# Patient Record
Sex: MALE | Race: WHITE | NOT HISPANIC OR LATINO | Employment: OTHER | URBAN - METROPOLITAN AREA
[De-identification: names, ages, dates, MRNs, and addresses within clinical notes are randomized per-mention and may not be internally consistent; named-entity substitution may affect disease eponyms.]

---

## 2017-03-11 ENCOUNTER — HOSPITAL ENCOUNTER (OUTPATIENT)
Dept: LAB | Facility: MEDICAL CENTER | Age: 67
End: 2017-03-11
Attending: FAMILY MEDICINE
Payer: MEDICARE

## 2017-03-11 LAB
25(OH)D3 SERPL-MCNC: 36 NG/ML (ref 30–100)
ALBUMIN SERPL BCP-MCNC: 4.2 G/DL (ref 3.2–4.9)
ALBUMIN/GLOB SERPL: 1.4 G/DL
ALP SERPL-CCNC: 97 U/L (ref 30–99)
ALT SERPL-CCNC: 35 U/L (ref 2–50)
ANION GAP SERPL CALC-SCNC: 8 MMOL/L (ref 0–11.9)
AST SERPL-CCNC: 29 U/L (ref 12–45)
BASOPHILS # BLD AUTO: 0.03 K/UL (ref 0–0.12)
BASOPHILS NFR BLD AUTO: 0.5 % (ref 0–1.8)
BILIRUB SERPL-MCNC: 0.6 MG/DL (ref 0.1–1.5)
BUN SERPL-MCNC: 15 MG/DL (ref 8–22)
CALCIUM SERPL-MCNC: 9.7 MG/DL (ref 8.5–10.5)
CHLORIDE SERPL-SCNC: 102 MMOL/L (ref 96–112)
CHOLEST SERPL-MCNC: 213 MG/DL (ref 100–199)
CO2 SERPL-SCNC: 29 MMOL/L (ref 20–33)
CREAT SERPL-MCNC: 0.98 MG/DL (ref 0.5–1.4)
CRP SERPL HS-MCNC: 4.2 MG/L (ref 0–7.5)
EOSINOPHIL # BLD: 0.57 K/UL (ref 0–0.51)
EOSINOPHIL NFR BLD AUTO: 8.8 % (ref 0–6.9)
ERYTHROCYTE [DISTWIDTH] IN BLOOD BY AUTOMATED COUNT: 51.2 FL (ref 35.9–50)
FOLATE SERPL-MCNC: >23.6 NG/ML
GLOBULIN SER CALC-MCNC: 2.9 G/DL (ref 1.9–3.5)
GLUCOSE SERPL-MCNC: 93 MG/DL (ref 65–99)
HAV IGM SERPL QL IA: NEGATIVE
HBV CORE IGM SERPL QL IA: NEGATIVE
HBV SURFACE AG SERPL QL IA: NEGATIVE
HCT VFR BLD AUTO: 46.4 % (ref 42–52)
HCV AB S/CO SERPL IA: NEGATIVE
HCYS SERPL-SCNC: 17.51 UMOL/L
HDLC SERPL-MCNC: 87 MG/DL
HGB BLD-MCNC: 15.4 G/DL (ref 14–18)
IMM GRANULOCYTES # BLD AUTO: 0.01 K/UL (ref 0–0.11)
IMM GRANULOCYTES NFR BLD AUTO: 0.2 % (ref 0–0.9)
LDLC SERPL CALC-MCNC: 117 MG/DL
LYMPHOCYTES # BLD: 0.92 K/UL (ref 1–4.8)
LYMPHOCYTES NFR BLD AUTO: 14.2 % (ref 22–41)
MCH RBC QN AUTO: 31.9 PG (ref 27–33)
MCHC RBC AUTO-ENTMCNC: 33.2 G/DL (ref 33.7–35.3)
MCV RBC AUTO: 96.1 FL (ref 81.4–97.8)
MONOCYTES # BLD: 0.66 K/UL (ref 0–0.85)
MONOCYTES NFR BLD AUTO: 10.2 % (ref 0–13.4)
NEUTROPHILS # BLD: 4.27 K/UL (ref 1.82–7.42)
NEUTROPHILS NFR BLD AUTO: 66.1 % (ref 44–72)
NRBC # BLD AUTO: 0 K/UL
NRBC BLD-RTO: 0 /100 WBC
PLATELET # BLD AUTO: 169 K/UL (ref 164–446)
PMV BLD AUTO: 10.1 FL (ref 9–12.9)
POTASSIUM SERPL-SCNC: 3.8 MMOL/L (ref 3.6–5.5)
PROT SERPL-MCNC: 7.1 G/DL (ref 6–8.2)
RBC # BLD AUTO: 4.83 M/UL (ref 4.7–6.1)
SODIUM SERPL-SCNC: 139 MMOL/L (ref 135–145)
T3 SERPL-MCNC: 90.1 NG/DL (ref 60–181)
T4 SERPL-MCNC: 4.9 UG/DL (ref 4–12)
TRIGL SERPL-MCNC: 43 MG/DL (ref 0–149)
TSH SERPL DL<=0.005 MIU/L-ACNC: 1.42 UIU/ML (ref 0.3–3.7)
VIT B12 SERPL-MCNC: >1500 PG/ML (ref 211–911)
WBC # BLD AUTO: 6.5 K/UL (ref 4.8–10.8)

## 2017-03-11 PROCEDURE — 82306 VITAMIN D 25 HYDROXY: CPT | Mod: GA

## 2017-03-11 PROCEDURE — 85025 COMPLETE CBC W/AUTO DIFF WBC: CPT

## 2017-03-11 PROCEDURE — 84436 ASSAY OF TOTAL THYROXINE: CPT

## 2017-03-11 PROCEDURE — 84443 ASSAY THYROID STIM HORMONE: CPT

## 2017-03-11 PROCEDURE — 83921 ORGANIC ACID SINGLE QUANT: CPT

## 2017-03-11 PROCEDURE — 36415 COLL VENOUS BLD VENIPUNCTURE: CPT

## 2017-03-11 PROCEDURE — 80053 COMPREHEN METABOLIC PANEL: CPT

## 2017-03-11 PROCEDURE — 80074 ACUTE HEPATITIS PANEL: CPT

## 2017-03-11 PROCEDURE — 84403 ASSAY OF TOTAL TESTOSTERONE: CPT

## 2017-03-11 PROCEDURE — 84480 ASSAY TRIIODOTHYRONINE (T3): CPT

## 2017-03-11 PROCEDURE — 84207 ASSAY OF VITAMIN B-6: CPT

## 2017-03-11 PROCEDURE — 82607 VITAMIN B-12: CPT

## 2017-03-11 PROCEDURE — 84270 ASSAY OF SEX HORMONE GLOBUL: CPT

## 2017-03-11 PROCEDURE — 86141 C-REACTIVE PROTEIN HS: CPT | Mod: GA

## 2017-03-11 PROCEDURE — 82746 ASSAY OF FOLIC ACID SERUM: CPT

## 2017-03-11 PROCEDURE — 80061 LIPID PANEL: CPT | Mod: GA

## 2017-03-11 PROCEDURE — 83090 ASSAY OF HOMOCYSTEINE: CPT | Mod: GA

## 2017-03-11 PROCEDURE — 84402 ASSAY OF FREE TESTOSTERONE: CPT

## 2017-03-13 LAB
SHBG SERPL-SCNC: 84 NMOL/L (ref 11–80)
TESTOST FREE MFR SERPL: 1.1 % (ref 1.6–2.9)
TESTOST FREE SERPL-MCNC: 78 PG/ML (ref 47–244)
TESTOST SERPL-MCNC: 731 NG/DL (ref 300–720)

## 2017-03-14 LAB — VIT B6 SERPL-MCNC: 132.2 NMOL/L (ref 20–125)

## 2017-03-15 LAB — METHYLMALONATE SERPL-SCNC: <0.1 UMOL/L (ref 0–0.4)

## 2017-03-16 ENCOUNTER — NON-PROVIDER VISIT (OUTPATIENT)
Dept: CARDIOLOGY | Facility: MEDICAL CENTER | Age: 67
End: 2017-03-16
Payer: MEDICARE

## 2017-03-16 VITALS
DIASTOLIC BLOOD PRESSURE: 70 MMHG | WEIGHT: 184 LBS | OXYGEN SATURATION: 97 % | HEIGHT: 69 IN | HEART RATE: 78 BPM | BODY MASS INDEX: 27.25 KG/M2 | SYSTOLIC BLOOD PRESSURE: 126 MMHG

## 2017-03-16 DIAGNOSIS — R07.89 CHEST TIGHTNESS OR PRESSURE: ICD-10-CM

## 2017-03-16 DIAGNOSIS — R53.83 FATIGUE, UNSPECIFIED TYPE: ICD-10-CM

## 2017-03-16 DIAGNOSIS — R53.83 OTHER FATIGUE: ICD-10-CM

## 2017-03-16 DIAGNOSIS — Z13.6 SCREENING FOR ISCHEMIC HEART DISEASE: ICD-10-CM

## 2017-03-16 LAB — TREADMILL STRESS: NORMAL

## 2017-03-16 PROCEDURE — 93015 CV STRESS TEST SUPVJ I&R: CPT | Performed by: INTERNAL MEDICINE

## 2017-03-30 ENCOUNTER — HOSPITAL ENCOUNTER (OUTPATIENT)
Dept: LAB | Facility: MEDICAL CENTER | Age: 67
End: 2017-03-30
Attending: FAMILY MEDICINE
Payer: MEDICARE

## 2017-03-30 LAB
AMYLASE SERPL-CCNC: 66 U/L (ref 20–103)
CEA SERPL-MCNC: 7.3 NG/ML (ref 0–3)
GGT SERPL-CCNC: 21 U/L (ref 7–51)
LIPASE SERPL-CCNC: 48 U/L (ref 11–82)

## 2017-03-30 PROCEDURE — 82977 ASSAY OF GGT: CPT | Mod: GA

## 2017-03-30 PROCEDURE — 82150 ASSAY OF AMYLASE: CPT

## 2017-03-30 PROCEDURE — 36415 COLL VENOUS BLD VENIPUNCTURE: CPT

## 2017-03-30 PROCEDURE — 82378 CARCINOEMBRYONIC ANTIGEN: CPT | Mod: GA

## 2017-03-30 PROCEDURE — 83690 ASSAY OF LIPASE: CPT

## 2017-04-28 ENCOUNTER — HOSPITAL ENCOUNTER (OUTPATIENT)
Dept: LAB | Facility: MEDICAL CENTER | Age: 67
End: 2017-04-28
Attending: FAMILY MEDICINE
Payer: MEDICARE

## 2017-04-28 LAB — CEA SERPL-MCNC: 6.7 NG/ML (ref 0–3)

## 2017-04-28 PROCEDURE — 82378 CARCINOEMBRYONIC ANTIGEN: CPT

## 2017-04-28 PROCEDURE — 36415 COLL VENOUS BLD VENIPUNCTURE: CPT

## 2017-05-05 ENCOUNTER — HOSPITAL ENCOUNTER (OUTPATIENT)
Dept: RADIOLOGY | Facility: MEDICAL CENTER | Age: 67
End: 2017-05-05
Attending: FAMILY MEDICINE
Payer: MEDICARE

## 2017-05-05 DIAGNOSIS — R05.9 COUGH: ICD-10-CM

## 2017-05-05 DIAGNOSIS — R06.02 SHORTNESS OF BREATH: ICD-10-CM

## 2017-05-05 PROCEDURE — 71260 CT THORAX DX C+: CPT

## 2017-05-05 PROCEDURE — 700117 HCHG RX CONTRAST REV CODE 255: Performed by: FAMILY MEDICINE

## 2017-05-05 RX ADMIN — IOHEXOL 100 ML: 350 INJECTION, SOLUTION INTRAVENOUS at 11:46

## 2017-06-23 ENCOUNTER — HOSPITAL ENCOUNTER (OUTPATIENT)
Dept: LAB | Facility: MEDICAL CENTER | Age: 67
End: 2017-06-23
Attending: FAMILY MEDICINE
Payer: MEDICARE

## 2017-06-23 LAB — CEA SERPL-MCNC: 6.3 NG/ML (ref 0–3)

## 2017-06-23 PROCEDURE — 36415 COLL VENOUS BLD VENIPUNCTURE: CPT | Mod: GA

## 2017-06-23 PROCEDURE — 82378 CARCINOEMBRYONIC ANTIGEN: CPT | Mod: GA

## 2017-06-23 PROCEDURE — 86671 FUNGUS NES ANTIBODY: CPT

## 2017-06-23 PROCEDURE — 86255 FLUORESCENT ANTIBODY SCREEN: CPT

## 2017-06-25 LAB
ANCA IGG TITR SER IF: ABNORMAL {TITER}
BAKER'S YEAST IGA QN IA: 8.4 UNITS (ref 0–24.9)
BAKER'S YEAST IGG QN IA: 63.8 UNITS (ref 0–24.9)
IBD INTERP Q0082: ABNORMAL
PATHOLOGY STUDY: ABNORMAL

## 2017-09-21 DIAGNOSIS — Z01.810 PRE-OPERATIVE CARDIOVASCULAR EXAMINATION: ICD-10-CM

## 2017-09-21 DIAGNOSIS — Z01.812 PRE-OPERATIVE LABORATORY EXAMINATION: ICD-10-CM

## 2017-09-21 LAB
ANION GAP SERPL CALC-SCNC: 9 MMOL/L (ref 0–11.9)
BASOPHILS # BLD AUTO: 0.7 % (ref 0–1.8)
BASOPHILS # BLD: 0.05 K/UL (ref 0–0.12)
BUN SERPL-MCNC: 12 MG/DL (ref 8–22)
CALCIUM SERPL-MCNC: 9.2 MG/DL (ref 8.5–10.5)
CHLORIDE SERPL-SCNC: 95 MMOL/L (ref 96–112)
CO2 SERPL-SCNC: 24 MMOL/L (ref 20–33)
CREAT SERPL-MCNC: 0.89 MG/DL (ref 0.5–1.4)
EKG IMPRESSION: NORMAL
EOSINOPHIL # BLD AUTO: 0.37 K/UL (ref 0–0.51)
EOSINOPHIL NFR BLD: 5.2 % (ref 0–6.9)
ERYTHROCYTE [DISTWIDTH] IN BLOOD BY AUTOMATED COUNT: 47.4 FL (ref 35.9–50)
GFR SERPL CREATININE-BSD FRML MDRD: >60 ML/MIN/1.73 M 2
GLUCOSE SERPL-MCNC: 84 MG/DL (ref 65–99)
HCT VFR BLD AUTO: 42.9 % (ref 42–52)
HGB BLD-MCNC: 14.9 G/DL (ref 14–18)
IMM GRANULOCYTES # BLD AUTO: 0.02 K/UL (ref 0–0.11)
IMM GRANULOCYTES NFR BLD AUTO: 0.3 % (ref 0–0.9)
LYMPHOCYTES # BLD AUTO: 0.99 K/UL (ref 1–4.8)
LYMPHOCYTES NFR BLD: 14 % (ref 22–41)
MCH RBC QN AUTO: 32.5 PG (ref 27–33)
MCHC RBC AUTO-ENTMCNC: 34.7 G/DL (ref 33.7–35.3)
MCV RBC AUTO: 93.7 FL (ref 81.4–97.8)
MONOCYTES # BLD AUTO: 0.6 K/UL (ref 0–0.85)
MONOCYTES NFR BLD AUTO: 8.5 % (ref 0–13.4)
NEUTROPHILS # BLD AUTO: 5.05 K/UL (ref 1.82–7.42)
NEUTROPHILS NFR BLD: 71.3 % (ref 44–72)
NRBC # BLD AUTO: 0 K/UL
NRBC BLD AUTO-RTO: 0 /100 WBC
PLATELET # BLD AUTO: 187 K/UL (ref 164–446)
PMV BLD AUTO: 9.2 FL (ref 9–12.9)
POTASSIUM SERPL-SCNC: 3.7 MMOL/L (ref 3.6–5.5)
RBC # BLD AUTO: 4.58 M/UL (ref 4.7–6.1)
SODIUM SERPL-SCNC: 128 MMOL/L (ref 135–145)
WBC # BLD AUTO: 7.1 K/UL (ref 4.8–10.8)

## 2017-09-21 PROCEDURE — 36415 COLL VENOUS BLD VENIPUNCTURE: CPT

## 2017-09-21 PROCEDURE — 93005 ELECTROCARDIOGRAM TRACING: CPT

## 2017-09-21 PROCEDURE — 85025 COMPLETE CBC W/AUTO DIFF WBC: CPT

## 2017-09-21 PROCEDURE — 93010 ELECTROCARDIOGRAM REPORT: CPT | Performed by: INTERNAL MEDICINE

## 2017-09-21 PROCEDURE — 80048 BASIC METABOLIC PNL TOTAL CA: CPT

## 2017-09-22 ENCOUNTER — HOSPITAL ENCOUNTER (OUTPATIENT)
Facility: MEDICAL CENTER | Age: 67
End: 2017-09-22
Attending: INTERNAL MEDICINE | Admitting: INTERNAL MEDICINE
Payer: MEDICARE

## 2017-09-22 VITALS
HEART RATE: 64 BPM | RESPIRATION RATE: 14 BRPM | BODY MASS INDEX: 26.94 KG/M2 | DIASTOLIC BLOOD PRESSURE: 75 MMHG | HEIGHT: 69 IN | OXYGEN SATURATION: 96 % | TEMPERATURE: 97.3 F | WEIGHT: 181.88 LBS | SYSTOLIC BLOOD PRESSURE: 140 MMHG

## 2017-09-22 PROBLEM — R53.83 FATIGUE: Status: RESOLVED | Noted: 2017-03-16 | Resolved: 2017-09-22

## 2017-09-22 PROBLEM — R07.89 CHEST TIGHTNESS OR PRESSURE: Status: RESOLVED | Noted: 2017-03-16 | Resolved: 2017-09-22

## 2017-09-22 PROBLEM — D49.0 NEOPLASM OF UNSPECIFIED NATURE OF DIGESTIVE SYSTEM: Status: ACTIVE | Noted: 2017-09-22

## 2017-09-22 LAB — PATHOLOGY CONSULT NOTE: NORMAL

## 2017-09-22 PROCEDURE — 160009 HCHG ANES TIME/MIN: Performed by: INTERNAL MEDICINE

## 2017-09-22 PROCEDURE — 88305 TISSUE EXAM BY PATHOLOGIST: CPT

## 2017-09-22 PROCEDURE — 160035 HCHG PACU - 1ST 60 MINS PHASE I: Performed by: INTERNAL MEDICINE

## 2017-09-22 PROCEDURE — 700111 HCHG RX REV CODE 636 W/ 250 OVERRIDE (IP)

## 2017-09-22 PROCEDURE — 160048 HCHG OR STATISTICAL LEVEL 1-5: Performed by: INTERNAL MEDICINE

## 2017-09-22 PROCEDURE — 160047 HCHG PACU  - EA ADDL 30 MINS PHASE II: Performed by: INTERNAL MEDICINE

## 2017-09-22 PROCEDURE — 500066 HCHG BITE BLOCK, ECT: Performed by: INTERNAL MEDICINE

## 2017-09-22 PROCEDURE — 160046 HCHG PACU - 1ST 60 MINS PHASE II: Performed by: INTERNAL MEDICINE

## 2017-09-22 PROCEDURE — 160203 HCHG ENDO MINUTES - 1ST 30 MINS LEVEL 4: Performed by: INTERNAL MEDICINE

## 2017-09-22 PROCEDURE — 160002 HCHG RECOVERY MINUTES (STAT): Performed by: INTERNAL MEDICINE

## 2017-09-22 PROCEDURE — 160025 RECOVERY II MINUTES (STATS): Performed by: INTERNAL MEDICINE

## 2017-09-22 RX ORDER — MIDAZOLAM HYDROCHLORIDE 1 MG/ML
INJECTION INTRAMUSCULAR; INTRAVENOUS
Status: DISPENSED
Start: 2017-09-22 | End: 2017-09-22

## 2017-09-22 RX ORDER — SODIUM CHLORIDE 9 MG/ML
1000 INJECTION, SOLUTION INTRAVENOUS
Status: COMPLETED | OUTPATIENT
Start: 2017-09-22 | End: 2017-09-22

## 2017-09-22 RX ADMIN — SODIUM CHLORIDE 1000 ML: 9 INJECTION, SOLUTION INTRAVENOUS at 06:40

## 2017-09-22 ASSESSMENT — PAIN SCALES - GENERAL
PAINLEVEL_OUTOF10: 0

## 2017-09-22 NOTE — OR NURSING
0828 Pt to PACU from OR via gurney, respirations spontaneous and non-labored via 2L NC. Pt not arousable on calling, VSS.    0840 Pt denies nausea and pain, VSS.   0855 Pt tolerating PO fluids. Pt meets criteria for transfer to stage II.   0910 No changes, VSS.  0919 Report to RAFA Mitchell.   0941 Pt transported to stage II.

## 2017-09-22 NOTE — PROCEDURES
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 Flo Ayers    Consent: Risks, benefits, and alternatives were discussed with patient and/or family member(s).  Consenting person(s) were given an opportunity to ask questions and discuss other options.  Risks including but not limited to perforation, infection, bleeding, missed lesion(s), cardiac and/or pulmonary event, aspiration, stroke, possible need for surgery and/or interventional radiology, hospitalization possibly prolonged, discomfort, unsuccessful and/or incomplete procedure, ineffective therapy and/or persistent symptoms, possible need for repeat procedures and/or additional testings, damage to adjacent organs and/or vascular structures, medication reaction, disability, death, and other adverse events possibly life-threatening.  Discussion was undertaken with Layman's terms.  Consenting person(s) stated understanding and acceptance of these risks, and wished to proceed.  Informed consent was given in clear state of mind.    Endoscopic procedures in detail:   Diagnostic endoscope was inserted from mouth into second portion of the duodenum, retroflexion was performed in the stomach.  Side-viewing duodenoscopy inserted from mouth into second portion of the duodenum to examine the ampulla of Vater, ampullary and periampullary mucosal biopsies were obtained away from the pancreas os.  There was suction of insufflated air and stomach fluid contents upon removal.      Radial echoendoscope was inserted from mouth to  second portion of the duodenum.  Pancreas was examined with identification of normal vascular landmarks including superior mesenteric artery, superior mesenteric vein, portal vein, celiac artery, portal vein confluence, and splenorenal junction.  Biliary duct was imaged and traced from intrapancreatic portion to hepatic hilum.  Celiac axis was imaged to look for echogenic lymph nodes.  There was suction of insufflated air and stomach fluid contents upon removal.    Procedure times:  - In-room 08:06  - Start 08:12  - Completed 08:25  - Out of room 08:27    Esophagogastroduodenoscopy Findings:  - Esophagus: endoscopically unremarkable  - Stomach: endoscopically unremarkable  - Duodenum: prominent ampulla of Vater when looking at it obliquely but with side-viewing duodenoscope examination ampulla looks more normal and the prominence is actually from redundant duodenal folds around ampulla of Vater.    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  Routine post-endoscopy anesthesia recovery care.  Discharge patient when he is awake, alert and comfortable, when recovery riteria are met.  Aspiration and fall precautions x 24 hours.   2. Follow-up with established GI Dr. Toby Joel.  3. I spoke to patient and recovery nurse about impression, diagnosis and recommendations.

## 2017-09-22 NOTE — OR NURSING
0929: Patient to stage II from PACU.  Patient assisted with changing by CNA, walked to bathroom and is now sitting in recliner chair.  No reported nausea, reports no pain.   0958: Discharge instructions and education provided to patient and Spouse.  Discharge medications discussed, patient has no questions about discharge or discharge medication at this time.  IV removed, tip intact.

## 2017-09-22 NOTE — DISCHARGE INSTRUCTIONS
ENDOSCOPY HOME CARE INSTRUCTIONS    GASTROSCOPY OR ERCP  1. Don't eat or drink anything for about an hour after the test. You can then resume your regular diet.  2. Don't drive or drink alcohol for 24 hours. The medication you received will make you too drowsy.  3. Don't take any coffee, tea, or aspirin products until after you see your doctor. These can harm the lining of your stomach.  4. If you begin to vomit bloody material, or develop black or bloody stools, call your doctor as soon as possible.  5. If you have any neck, chest, abdominal pain or temp of 100 degrees, call your doctor.  6. See your doctor: [facility] office will call for follow-up with Dr. Ashford.  7. Additional instructions:    No alcohol or sleeping pills today.   Patient not to drive, operate heavy machinery or make important decisions for 24 hours.   Please confirm that patient has a responsible adult /chaparone to accompany patient.     8. Prescriptions: none given today     Results to convey to patient: Impression: Prominent ampulla of Vater from redundant duodenal folds nothing worrisome, Follow-up with established GI Dr. Toby Ashford.    You should call 911 if you develop problems with breathing or chest pain.  If any questions arise, call your doctor. If your doctor is not available, please feel free to call [phone]. You can also call the HEALTH HOTLINE open 24 hours/day, 7 days/week and speak to a nurse at [phone], or toll free [phone].        Depression / Suicide Risk    As you are discharged from this [facility] facility, it is important to learn how to keep safe from harming yourself.    Recognize the warning signs:  · Abrupt changes in personality, positive or negative- including increase in energy   · Giving away possessions  · Change in eating patterns- significant weight changes-  positive or negative  · Change in sleeping patterns- unable to sleep or sleeping all the time   · Unwillingness or  inability to communicate  · Depression  · Unusual sadness, discouragement and loneliness  · Talk of wanting to die  · Neglect of personal appearance   · Rebelliousness- reckless behavior  · Withdrawal from people/activities they love  · Confusion- inability to concentrate     If you or a loved one observes any of these behaviors or has concerns about self-harm, here's what you can do:  · Talk about it- your feelings and reasons for harming yourself  · Remove any means that you might use to hurt yourself (examples: pills, rope, extension cords, firearm)  · Get professional help from the community (Mental Health, Substance Abuse, psychological counseling)  · Do not be alone:Call your Safe Contact- someone whom you trust who will be there for you.  · Call your local CRISIS HOTLINE 516-1631 or 306-680-1496  · Call your local Children's Mobile Crisis Response Team [place] (932) 008-1447 or www.[employer]  · Call the toll free National Suicide Prevention Hotlines   · National Suicide Prevention Lifeline 434-402-DIZS (2275)  · National Hope Line Network 800-SUICIDE (792-6237)    I acknowledge receipt and understanding of these Home Care Instructions.

## 2017-12-08 ENCOUNTER — HOSPITAL ENCOUNTER (OUTPATIENT)
Dept: LAB | Facility: MEDICAL CENTER | Age: 67
End: 2017-12-08
Attending: FAMILY MEDICINE
Payer: MEDICARE

## 2017-12-08 LAB
ALBUMIN SERPL BCP-MCNC: 3.9 G/DL (ref 3.2–4.9)
ALBUMIN/GLOB SERPL: 1.6 G/DL
ALP SERPL-CCNC: 74 U/L (ref 30–99)
ALT SERPL-CCNC: 31 U/L (ref 2–50)
AMYLASE SERPL-CCNC: 56 U/L (ref 20–103)
ANION GAP SERPL CALC-SCNC: 10 MMOL/L (ref 0–11.9)
AST SERPL-CCNC: 32 U/L (ref 12–45)
BASOPHILS # BLD AUTO: 0.9 % (ref 0–1.8)
BASOPHILS # BLD: 0.05 K/UL (ref 0–0.12)
BILIRUB SERPL-MCNC: 0.6 MG/DL (ref 0.1–1.5)
BUN SERPL-MCNC: 15 MG/DL (ref 8–22)
CALCIUM SERPL-MCNC: 9.3 MG/DL (ref 8.5–10.5)
CEA SERPL-MCNC: 5.4 NG/ML (ref 0–3)
CHLORIDE SERPL-SCNC: 101 MMOL/L (ref 96–112)
CO2 SERPL-SCNC: 26 MMOL/L (ref 20–33)
CREAT SERPL-MCNC: 0.91 MG/DL (ref 0.5–1.4)
EOSINOPHIL # BLD AUTO: 0.4 K/UL (ref 0–0.51)
EOSINOPHIL NFR BLD: 6.8 % (ref 0–6.9)
ERYTHROCYTE [DISTWIDTH] IN BLOOD BY AUTOMATED COUNT: 52.6 FL (ref 35.9–50)
GFR SERPL CREATININE-BSD FRML MDRD: >60 ML/MIN/1.73 M 2
GLOBULIN SER CALC-MCNC: 2.4 G/DL (ref 1.9–3.5)
GLUCOSE SERPL-MCNC: 89 MG/DL (ref 65–99)
HCT VFR BLD AUTO: 45.4 % (ref 42–52)
HCYS SERPL-SCNC: 17.4 UMOL/L
HGB BLD-MCNC: 15.4 G/DL (ref 14–18)
IMM GRANULOCYTES # BLD AUTO: 0.02 K/UL (ref 0–0.11)
IMM GRANULOCYTES NFR BLD AUTO: 0.3 % (ref 0–0.9)
LIPASE SERPL-CCNC: 33 U/L (ref 11–82)
LYMPHOCYTES # BLD AUTO: 0.93 K/UL (ref 1–4.8)
LYMPHOCYTES NFR BLD: 15.8 % (ref 22–41)
MCH RBC QN AUTO: 32.8 PG (ref 27–33)
MCHC RBC AUTO-ENTMCNC: 33.9 G/DL (ref 33.7–35.3)
MCV RBC AUTO: 96.8 FL (ref 81.4–97.8)
MONOCYTES # BLD AUTO: 0.49 K/UL (ref 0–0.85)
MONOCYTES NFR BLD AUTO: 8.3 % (ref 0–13.4)
NEUTROPHILS # BLD AUTO: 3.98 K/UL (ref 1.82–7.42)
NEUTROPHILS NFR BLD: 67.9 % (ref 44–72)
NRBC # BLD AUTO: 0 K/UL
NRBC BLD AUTO-RTO: 0 /100 WBC
PLATELET # BLD AUTO: 167 K/UL (ref 164–446)
PMV BLD AUTO: 9.9 FL (ref 9–12.9)
POTASSIUM SERPL-SCNC: 3.9 MMOL/L (ref 3.6–5.5)
PROT SERPL-MCNC: 6.3 G/DL (ref 6–8.2)
RBC # BLD AUTO: 4.69 M/UL (ref 4.7–6.1)
SODIUM SERPL-SCNC: 137 MMOL/L (ref 135–145)
WBC # BLD AUTO: 5.9 K/UL (ref 4.8–10.8)

## 2017-12-08 PROCEDURE — 85025 COMPLETE CBC W/AUTO DIFF WBC: CPT

## 2017-12-08 PROCEDURE — 82150 ASSAY OF AMYLASE: CPT

## 2017-12-08 PROCEDURE — 80053 COMPREHEN METABOLIC PANEL: CPT

## 2017-12-08 PROCEDURE — 82378 CARCINOEMBRYONIC ANTIGEN: CPT

## 2017-12-08 PROCEDURE — 83921 ORGANIC ACID SINGLE QUANT: CPT

## 2017-12-08 PROCEDURE — 84585 ASSAY OF URINE VMA: CPT

## 2017-12-08 PROCEDURE — 83090 ASSAY OF HOMOCYSTEINE: CPT | Mod: GA

## 2017-12-08 PROCEDURE — 83690 ASSAY OF LIPASE: CPT

## 2017-12-08 PROCEDURE — 36415 COLL VENOUS BLD VENIPUNCTURE: CPT

## 2017-12-11 LAB
COLLECT DURATION TIME SPEC: NORMAL HRS
CREAT 24H UR-MCNC: 37 MG/DL
CREAT 24H UR-MRATE: NORMAL MG/D (ref 800–2100)
METHYLMALONATE SERPL-SCNC: <0.1 UMOL/L (ref 0–0.4)
SPECIMEN VOL ?TM UR: NORMAL ML
VMA & CREAT 24H UR-IMP: NORMAL
VMA 24H UR-MCNC: 0.9 MG/L
VMA 24H UR-MRATE: NORMAL MG/D (ref 0–7)
VMA/CREAT 24H UR: 2 MG/GCR (ref 0–6)

## 2018-01-18 ENCOUNTER — HOSPITAL ENCOUNTER (OUTPATIENT)
Dept: LAB | Facility: MEDICAL CENTER | Age: 68
End: 2018-01-18
Attending: FAMILY MEDICINE
Payer: MEDICARE

## 2018-01-18 LAB
CHOLEST SERPL-MCNC: 184 MG/DL (ref 100–199)
CRP SERPL HS-MCNC: 1.34 MG/DL (ref 0–0.75)
ERYTHROCYTE [SEDIMENTATION RATE] IN BLOOD BY WESTERGREN METHOD: 23 MM/HOUR (ref 0–20)
HDLC SERPL-MCNC: 90 MG/DL
LDLC SERPL CALC-MCNC: 85 MG/DL
TRIGL SERPL-MCNC: 45 MG/DL (ref 0–149)

## 2018-01-18 PROCEDURE — 80061 LIPID PANEL: CPT

## 2018-01-18 PROCEDURE — 85652 RBC SED RATE AUTOMATED: CPT

## 2018-01-18 PROCEDURE — 36415 COLL VENOUS BLD VENIPUNCTURE: CPT

## 2018-01-18 PROCEDURE — 86140 C-REACTIVE PROTEIN: CPT

## 2018-02-15 ENCOUNTER — OFFICE VISIT (OUTPATIENT)
Dept: MEDICAL GROUP | Facility: MEDICAL CENTER | Age: 68
End: 2018-02-15
Payer: MEDICARE

## 2018-02-15 VITALS
DIASTOLIC BLOOD PRESSURE: 80 MMHG | HEART RATE: 69 BPM | SYSTOLIC BLOOD PRESSURE: 126 MMHG | TEMPERATURE: 98.5 F | RESPIRATION RATE: 16 BRPM | BODY MASS INDEX: 27.06 KG/M2 | OXYGEN SATURATION: 98 % | HEIGHT: 70 IN | WEIGHT: 189 LBS

## 2018-02-15 DIAGNOSIS — M51.36 DDD (DEGENERATIVE DISC DISEASE), LUMBAR: ICD-10-CM

## 2018-02-15 DIAGNOSIS — Z80.0 FAMILY HISTORY OF PANCREATIC CANCER: ICD-10-CM

## 2018-02-15 DIAGNOSIS — R79.83 HOMOCYSTEINEMIA: ICD-10-CM

## 2018-02-15 DIAGNOSIS — R53.83 OTHER FATIGUE: ICD-10-CM

## 2018-02-15 DIAGNOSIS — R79.82 ELEVATED C-REACTIVE PROTEIN (CRP): ICD-10-CM

## 2018-02-15 DIAGNOSIS — K21.9 GASTROESOPHAGEAL REFLUX DISEASE WITHOUT ESOPHAGITIS: ICD-10-CM

## 2018-02-15 DIAGNOSIS — H25.9 AGE-RELATED CATARACT OF BOTH EYES, UNSPECIFIED AGE-RELATED CATARACT TYPE: ICD-10-CM

## 2018-02-15 DIAGNOSIS — K52.9 INFLAMMATORY BOWEL DISEASE: ICD-10-CM

## 2018-02-15 DIAGNOSIS — R97.0 ELEVATED CARCINOEMBRYONIC ANTIGEN (CEA): ICD-10-CM

## 2018-02-15 DIAGNOSIS — J30.1 CHRONIC SEASONAL ALLERGIC RHINITIS DUE TO POLLEN: ICD-10-CM

## 2018-02-15 DIAGNOSIS — G43.009 MIGRAINE WITHOUT AURA AND WITHOUT STATUS MIGRAINOSUS, NOT INTRACTABLE: ICD-10-CM

## 2018-02-15 DIAGNOSIS — H35.711 CENTRAL SEROUS CHORIORETINOPATHY OF RIGHT EYE: ICD-10-CM

## 2018-02-15 DIAGNOSIS — Z86.69 HISTORY OF BELL'S PALSY: ICD-10-CM

## 2018-02-15 DIAGNOSIS — Z80.0 FAMILY HISTORY OF COLON CANCER IN MOTHER: ICD-10-CM

## 2018-02-15 PROCEDURE — 99204 OFFICE O/P NEW MOD 45 MIN: CPT | Performed by: FAMILY MEDICINE

## 2018-02-15 ASSESSMENT — PATIENT HEALTH QUESTIONNAIRE - PHQ9: CLINICAL INTERPRETATION OF PHQ2 SCORE: 0

## 2018-02-15 NOTE — ASSESSMENT & PLAN NOTE
Patient complains of   nasal congestion, rhinorrhea, eye itching, eye irritation, watery eyes  Has tried over the counter medications, prescription nasal sprays using saline mist, netipot.  No asthma -  No increased symptoms with exertion.  Symptoms seasonal in spring, but worse in the fall.     ROS  No fever, chills, sweats.  No productive cough  No sinus pain or pressure  No swollen glands  No rash. No eczema.  Pertinent  ROS findings as above. All other systems reviewed and are negative.       Education: Discussed that no one medicine likely to provide complete relief. Discussed allergen avoidance and environment modification when possible, showering and shampooing before bed, taking shoes off indoors so not tracking pollen in home.  Discussed that meds more effective with daily use.   Advised Zyrtec or Allegra OTC, Nasal steroid Rx, OTC allergy eye drops prn. If not effective, consider immunotherapy.

## 2018-02-15 NOTE — LETTER
[employer]  Pratibha Corral M.D.  [address]  [place] [address]  Fax: [phone]   Authorization for Release/Disclosure of   Protected Health Information   Name: RIVER CONNOLLY : 1950 SSN: [id number]   Address: [facility] [address]  [place] [address] Phone:    [phone] (home) [phone] (work)   I authorize the entity listed below to release/disclose the PHI below to:   [employer] [facility]/Pratibha Corral M.D. and Pratibha Corral M.D.   Provider or Entity Name:   Address   City, State, Zip   Phone:      Fax:     Reason for request: continuity of care   Information to be released:    [  ] LAST COLONOSCOPY,  including any PATH REPORT and follow-up  [  ] LAST FIT/COLOGUARD RESULT [  ] LAST DEXA  [  ] LAST MAMMOGRAM  [  ] LAST PAP  [  ] LAST LABS [  ] RETINA EXAM REPORT  [  ] IMMUNIZATION RECORDS  [XXX  ] Release all info      [  ] Check here and initial the line next to each item to release ALL health information INCLUDING  _____ Care and treatment for drug and / or alcohol abuse  _____ HIV testing, infection status, or AIDS  _____ Genetic Testing    DATES OF SERVICE OR TIME PERIOD TO BE DISCLOSED: _____________  I understand and acknowledge that:  * This Authorization may be revoked at any time by you in writing, except if your health information has already been used or disclosed.  * Your health information that will be used or disclosed as a result of you signing this authorization could be re-disclosed by the recipient. If this occurs, your re-disclosed health information may no longer be protected by State or Federal laws.  * You may refuse to sign this Authorization. Your refusal will not affect your ability to obtain treatment.  * This Authorization becomes effective upon signing and will  on (date) __________.      If no date is indicated, this Authorization will  one (1) year from the signature date.    Name: River Connolly    Signature:   Date:     2/15/2018          PLEASE FAX REQUESTED RECORDS BACK TO: [phone]

## 2018-02-21 NOTE — ASSESSMENT & PLAN NOTE
Patient was found to have increased C-reactive protein and elevated homocysteine levels.  He is not on a medication for this.

## 2018-02-21 NOTE — ASSESSMENT & PLAN NOTE
Patient has had an elevated CEA that has been followed. His colonoscopy and endoscopy showed no reason for this. There is a family history of pancreatic cancer his brother just  of this. He has not had a CA 19 9. He denies any pain in the area. No unusual weight loss

## 2018-02-21 NOTE — ASSESSMENT & PLAN NOTE
Patient has a history of migraines that have been fairly well controlled. He goes into a dark room when he has exacerbations. He does not have auras. He denies any nausea or vomiting.

## 2018-02-21 NOTE — PROGRESS NOTES
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  This is stable.    Chronic seasonal allergic rhinitis due to pollen  Patient complains of   nasal congestion, rhinorrhea, eye itching, eye irritation, watery eyes  Has tried over the counter medications, prescription nasal sprays using saline mist, netipot.  No asthma -  No increased symptoms with exertion.  Symptoms seasonal in spring, but worse in the fall.     ROS  No fever, chills, sweats.  No productive cough  No sinus pain or pressure  No swollen glands  No rash. No eczema.  Pertinent  ROS findings as above. All other systems reviewed and are negative.       Education: Discussed that no one medicine likely to provide complete relief. Discussed allergen avoidance and environment modification when possible, showering and shampooing before bed, taking shoes off indoors so not tracking pollen in home.  Discussed that meds more effective with daily use.   Advised Zyrtec or Allegra OTC, Nasal steroid Rx, OTC allergy eye drops prn. If not effective, consider immunotherapy.      Age-related cataract of both eyes  Dr. Miranad is following    Elevated carcinoembryonic antigen (CEA)  Patient has had an elevated CEA that has been followed. His colonoscopy and endoscopy showed no reason for this. There is a family history of pancreatic cancer his brother just  of this. He has not had a CA 19 9. He denies any pain in the area. No unusual weight loss    Gastroesophageal reflux disease without esophagitis  Patient has a history of GERD that he manages with behavioral modifications.    Migraine without aura and without status migrainosus, not intractable  Patient has a history of migraines that have been fairly well controlled. He goes into a dark room when he has  "exacerbations. He does not have auras. He denies any nausea or vomiting.    DDD (degenerative disc disease), lumbar  Patient has degenerative disc disease and has had a temperature also for this.       No \"RED FLAGS’ FOR FRACTURE: (Recent major trauma, Minor trauma or strenuous lifting in older or potentially osteoporotic patient, History of chronic corticosteroid therapy)  No \"RED FLAGS\" FOR NEOPLASM OR INFECTION: (Age over 50 or under 20, History of cancer, jose. breast, lung, prostate, Recent fever/chills, Recent unexplained weight loss,  Recent bacterial infection, current IV drug use, Immunosuppression (from meds, HIV, etc.), Pain worse when supine or at night)  No \"RED FLAGS’ FOR CAUDA EQUINA SYNDROME: (Saddle anesthesia, urine retention, fecal incontinence, Severe or progressive LE neurologic deficit)          Elevated C-reactive protein (CRP)  Patient was found to have increased C-reactive protein and elevated homocysteine levels.  He is not on a medication for this.      No Known Allergies    Current medicines (including changes today)  Current Outpatient Prescriptions   Medication Sig Dispense Refill   • multivitamin (THERAGRAN) Tab Take 1 Tab by mouth every day.       No current facility-administered medications for this visit.      He  has a past medical history of Arthritis; Cataract; Central serous retinopathy; Central serous retinopathy (10/17/2011); Cold (09/2017); Heart burn; and Hemorrhagic disorder (CMS-HCC).  He  has a past surgical history that includes umbilical hernia repair (2006); other (Right, 1969); gastroscopy (9/22/2017); egd w/endoscopic ultrasound (9/22/2017); gastroscopy with biopsy (9/22/2017); toe arthroplasty (6/26/2013); tonsillectomy and adenoidectomy; and cervical laminectomy posterior.  Social History   Substance Use Topics   • Smoking status: Former Smoker     Packs/day: 1.00     Years: 20.00     Types: Cigarettes     Quit date: 2/15/2001   • Smokeless tobacco: Never Used   • " "Alcohol use 0.6 oz/week     1 Glasses of wine per week     Social History     Social History Narrative   • No narrative on file     Family History   Problem Relation Age of Onset   • Cancer Mother      breast and adenocarcinoma of colon   • Stroke Father    • Cancer Brother      pancreatic cancer     Family Status   Relation Status   • Mother    • Father    • Brother          ROS  No fever or chills.  No nausea or vomiting.  No chest pain or palpitations.  No cough or SOB.  No pain with urination or hematuria.  No black or bloody stools.  All other systems reviewed and are negative     Objective:     Blood pressure 126/80, pulse 69, temperature 36.9 °C (98.5 °F), resp. rate 16, height 1.778 m (5' 10\"), weight 85.7 kg (189 lb), SpO2 98 %. Body mass index is 27.12 kg/m².  Physical Exam:      Well developed, well nourished.  Alert, oriented in no acute distress.  Psych: Eye contact is good, speech goal directed, affect calm  Eyes: conjunctiva non-injected, sclera non-icteric.  Ears: Pinna normal. TM pearly gray.   Nose: Nares are patent.  Pale, boggy mucosa  Mouth: Oral mucous membranes pink and moist with no lesions.  Neck Supple.  No adenopathy or masses in the neck or supraclavicular regions. No thyromegaly  Lungs: clear to auscultation bilaterally with good excursion. No wheezes or rhonchi  CV: regular rate and rhythm. No murmur  Abdomen: soft, nontender, no masses or organomegaly.  No rebound or guarding  Ext: no edema, color normal, vascularity normal, temperature normal      Assessment and Plan:   The following treatment plan was discussed    1. Elevated carcinoembryonic antigen (CEA)  Repeat CEA.  Check CA-19-9  - CEA; Future  - CARBOHYDRATE ANTIGEN 19-9    2. Central serous chorioretinopathy of right eye  Followed up with ophthalmology    3. Chronic seasonal allergic rhinitis due to pollen  Symptomatic care. Call if symptoms worsen    4. Age-related cataract of both eyes, unspecified " age-related cataract type  Follow-up with ophthalmology    5. Gastroesophageal reflux disease without esophagitis  Follow-up with gastroenterology  - CBC WITH DIFFERENTIAL; Future    6. Inflammatory bowel disease  Stable. Follow up with gastroenterology    7. Migraine without aura and without status migrainosus, not intractable  Stable. Patient reports that amitriptyline and propranolol well for this he has a flare.    8. History of Bell's palsy  Stable. No recurrence    9. DDD (degenerative disc disease), lumbar  Stable. No current pain    10. Elevated C-reactive protein (CRP)  Recheck CRP  - CRP HIGH SENSITIVE (CARDIAC); Future    11. Homocysteinemia (CMS-HCC)  Recheck  - HOMOC she homocysteineYSTEINE; Future    12. Family history of colon cancer in mother    - CEA; Future    13. Family history of pancreatic cancer    - CEA; Future  - CARBOHYDRATE ANTIGEN 19-9    14. Other fatigue  - CBC WITH DIFFERENTIAL; Future  - TSH; Future  - FREE THYROXINE; Future      Records requested.    Any change or worsening of signs or symptoms, patient encouraged to follow-up or report to the emergency room for further evaluation. Patient understands and agrees.    Followup: Return if symptoms worsen or fail to improve.

## 2018-04-19 ENCOUNTER — HOSPITAL ENCOUNTER (OUTPATIENT)
Dept: LAB | Facility: MEDICAL CENTER | Age: 68
End: 2018-04-19
Attending: FAMILY MEDICINE
Payer: MEDICARE

## 2018-04-19 DIAGNOSIS — R79.83 HOMOCYSTEINEMIA: ICD-10-CM

## 2018-04-19 DIAGNOSIS — Z80.0 FAMILY HISTORY OF COLON CANCER IN MOTHER: ICD-10-CM

## 2018-04-19 DIAGNOSIS — R53.83 OTHER FATIGUE: ICD-10-CM

## 2018-04-19 DIAGNOSIS — R97.0 ELEVATED CARCINOEMBRYONIC ANTIGEN (CEA): ICD-10-CM

## 2018-04-19 DIAGNOSIS — R79.82 ELEVATED C-REACTIVE PROTEIN (CRP): ICD-10-CM

## 2018-04-19 DIAGNOSIS — Z80.0 FAMILY HISTORY OF PANCREATIC CANCER: ICD-10-CM

## 2018-04-19 DIAGNOSIS — K21.9 GASTROESOPHAGEAL REFLUX DISEASE WITHOUT ESOPHAGITIS: ICD-10-CM

## 2018-04-19 LAB
BASOPHILS # BLD AUTO: 0.9 % (ref 0–1.8)
BASOPHILS # BLD: 0.07 K/UL (ref 0–0.12)
CANCER AG19-9 SERPL-ACNC: 7.9 U/ML (ref 0–35)
CEA SERPL-MCNC: 5.6 NG/ML (ref 0–3)
CRP SERPL HS-MCNC: 3.1 MG/L (ref 0–7.5)
EOSINOPHIL # BLD AUTO: 0.46 K/UL (ref 0–0.51)
EOSINOPHIL NFR BLD: 5.9 % (ref 0–6.9)
ERYTHROCYTE [DISTWIDTH] IN BLOOD BY AUTOMATED COUNT: 54.2 FL (ref 35.9–50)
HCT VFR BLD AUTO: 43.5 % (ref 42–52)
HCYS SERPL-SCNC: 16.6 UMOL/L
HGB BLD-MCNC: 14.8 G/DL (ref 14–18)
IMM GRANULOCYTES # BLD AUTO: 0.02 K/UL (ref 0–0.11)
IMM GRANULOCYTES NFR BLD AUTO: 0.3 % (ref 0–0.9)
LYMPHOCYTES # BLD AUTO: 1.1 K/UL (ref 1–4.8)
LYMPHOCYTES NFR BLD: 14 % (ref 22–41)
MCH RBC QN AUTO: 33 PG (ref 27–33)
MCHC RBC AUTO-ENTMCNC: 34 G/DL (ref 33.7–35.3)
MCV RBC AUTO: 96.9 FL (ref 81.4–97.8)
MONOCYTES # BLD AUTO: 0.69 K/UL (ref 0–0.85)
MONOCYTES NFR BLD AUTO: 8.8 % (ref 0–13.4)
NEUTROPHILS # BLD AUTO: 5.52 K/UL (ref 1.82–7.42)
NEUTROPHILS NFR BLD: 70.1 % (ref 44–72)
NRBC # BLD AUTO: 0 K/UL
NRBC BLD-RTO: 0 /100 WBC
PLATELET # BLD AUTO: 146 K/UL (ref 164–446)
PMV BLD AUTO: 9.5 FL (ref 9–12.9)
RBC # BLD AUTO: 4.49 M/UL (ref 4.7–6.1)
T4 FREE SERPL-MCNC: 1.46 NG/DL (ref 0.53–1.43)
TSH SERPL DL<=0.005 MIU/L-ACNC: 2.37 UIU/ML (ref 0.38–5.33)
WBC # BLD AUTO: 7.9 K/UL (ref 4.8–10.8)

## 2018-04-19 PROCEDURE — 84443 ASSAY THYROID STIM HORMONE: CPT

## 2018-04-19 PROCEDURE — 82378 CARCINOEMBRYONIC ANTIGEN: CPT

## 2018-04-19 PROCEDURE — 36415 COLL VENOUS BLD VENIPUNCTURE: CPT

## 2018-04-19 PROCEDURE — 85025 COMPLETE CBC W/AUTO DIFF WBC: CPT

## 2018-04-19 PROCEDURE — 83090 ASSAY OF HOMOCYSTEINE: CPT | Mod: GA

## 2018-04-19 PROCEDURE — 84439 ASSAY OF FREE THYROXINE: CPT

## 2018-04-19 PROCEDURE — 86301 IMMUNOASSAY TUMOR CA 19-9: CPT | Mod: GA

## 2018-04-19 PROCEDURE — 86141 C-REACTIVE PROTEIN HS: CPT | Mod: GA

## 2018-04-25 ENCOUNTER — TELEPHONE (OUTPATIENT)
Dept: MEDICAL GROUP | Facility: MEDICAL CENTER | Age: 68
End: 2018-04-25

## 2018-04-25 NOTE — TELEPHONE ENCOUNTER
----- Message from Pratibha Corral M.D. sent at 4/25/2018  1:02 PM PDT -----  Toni Hadley  Here are your recent labs results. Please make an appointment so we can go over these.  Please let me know if you have any further questions.  Have a great day.  Pratibha Corral M.D.

## 2018-05-22 ENCOUNTER — OFFICE VISIT (OUTPATIENT)
Dept: MEDICAL GROUP | Facility: MEDICAL CENTER | Age: 68
End: 2018-05-22
Payer: MEDICARE

## 2018-05-22 VITALS
SYSTOLIC BLOOD PRESSURE: 124 MMHG | BODY MASS INDEX: 26.81 KG/M2 | HEART RATE: 79 BPM | HEIGHT: 69 IN | TEMPERATURE: 97.9 F | DIASTOLIC BLOOD PRESSURE: 64 MMHG | WEIGHT: 181 LBS | RESPIRATION RATE: 20 BRPM | OXYGEN SATURATION: 96 %

## 2018-05-22 DIAGNOSIS — R97.0 ELEVATED CARCINOEMBRYONIC ANTIGEN (CEA): ICD-10-CM

## 2018-05-22 DIAGNOSIS — R79.89 ELEVATED SERUM FREE T4 LEVEL: ICD-10-CM

## 2018-05-22 DIAGNOSIS — Z80.0 FAMILY HISTORY OF COLON CANCER IN MOTHER: ICD-10-CM

## 2018-05-22 DIAGNOSIS — Z80.0 FAMILY HISTORY OF PANCREATIC CANCER: ICD-10-CM

## 2018-05-22 PROCEDURE — 99214 OFFICE O/P EST MOD 30 MIN: CPT | Performed by: FAMILY MEDICINE

## 2018-05-22 RX ORDER — RANITIDINE 150 MG/1
150 TABLET ORAL 2 TIMES DAILY
COMMUNITY
End: 2020-02-18

## 2018-05-23 NOTE — ASSESSMENT & PLAN NOTE
Patient's CEA continues to rise. It is now 5.6. Patient had an endoscopy and colonoscopy last year that were negative except for a polyp. He  had a CT of the abdomen and pelvis last year that were normal. He's concerned because of a family history of colon cancer in his mother and pancreatic cancer in his brother. He denies any unusual weight loss. He has had some fatigue. He denies any palpitations or insomnia. No abdominal bloating. A CA-19-9 was negative.

## 2018-05-23 NOTE — ASSESSMENT & PLAN NOTE
Patient had a slightly elevated free T4 but with a normal TSH. He's never had an abnormal thyroid function before. He denies any palpitations, unusual weight loss or insomnia.

## 2018-05-23 NOTE — PROGRESS NOTES
Subjective:     Chief Complaint   Patient presents with   • Follow-Up     labs       Jomar Thomas is a 67 y.o. male here today for evaluation and management of:    Elevated carcinoembryonic antigen (CEA)  Patient's CEA continues to rise. It is now 5.6. Patient had an endoscopy and colonoscopy last year that were negative except for a polyp. He  had a CT of the abdomen and pelvis last year that were normal. He's concerned because of a family history of colon cancer in his mother and pancreatic cancer in his brother. He denies any unusual weight loss. He has had some fatigue. He denies any palpitations or insomnia. No abdominal bloating. A CA-19-9 was negative.    Elevated serum free T4 level  Patient had a slightly elevated free T4 but with a normal TSH. He's never had an abnormal thyroid function before. He denies any palpitations, unusual weight loss or insomnia.       No Known Allergies    Current medicines (including changes today)  Current Outpatient Prescriptions   Medication Sig Dispense Refill   • raNITidine (ZANTAC) 150 MG Tab Take 150 mg by mouth 2 times a day.     • multivitamin (THERAGRAN) Tab Take 1 Tab by mouth every day.       No current facility-administered medications for this visit.        He  has a past medical history of Arthritis; Cataract; Central serous retinopathy; Central serous retinopathy (10/17/2011); Cold (09/2017); Heart burn; and Hemorrhagic disorder (HCC).    Patient Active Problem List    Diagnosis Date Noted   • 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 09/22/2017     Priority: High   • Elevated serum free T4 level 05/22/2018   • Elevated carcinoembryonic antigen (CEA) 02/15/2018   • Chronic seasonal allergic rhinitis due to pollen 02/15/2018   • Age-related cataract of both eyes 02/15/2018   • Gastroesophageal reflux disease without esophagitis 02/15/2018   • Inflammatory bowel disease 02/15/2018   • Migraine without aura and without status migrainosus, not  "intractable 02/15/2018   • History of Bell's palsy 02/15/2018   • DDD (degenerative disc disease), lumbar 02/15/2018   • Elevated C-reactive protein (CRP) 02/15/2018   • Homocysteinemia (HCC) 02/15/2018   • Family history of colon cancer in mother 02/15/2018   • Family history of pancreatic cancer 02/15/2018   • Other fatigue 03/16/2017   • Central serous retinopathy 10/17/2011       ROS   No fever or chills.  No nausea or vomiting.  No chest pain or palpitations.  No cough or SOB.  No pain with urination or hematuria.  No black or bloody stools.       Objective:     Blood pressure 124/64, pulse 79, temperature 36.6 °C (97.9 °F), resp. rate 20, height 1.753 m (5' 9\"), weight 82.1 kg (181 lb), SpO2 96 %. Body mass index is 26.73 kg/m².   Physical Exam:  Well developed, well nourished.  Alert, oriented in no acute distress.  Eye contact is good, speech goal directed, affect calm  Eyes: conjunctiva non-injected, sclera non-icteric.  Neck Supple.  No adenopathy or masses in the neck or supraclavicular regions. No thyromegaly  Lungs: clear to auscultation bilaterally with good excursion. No wheezes or rhonchi  CV: regular rate and rhythm. No murmur  Abdomen: soft, nontender, no masses or organomegaly.  No rebound or guarding            Assessment and Plan:   The following treatment plan was discussed    1. Elevated carcinoembryonic antigen (CEA)  Given his rising CEA will get an MRI of the abdomen with and without contrast. Repeat CEA. We will also check the thyroid as this can be a cause for an elevated CEA.   - MR-ABDOMEN-WITH & W/O; Future  - BASIC METABOLIC PANEL; Future  - CEA; Future  - US-SOFT TISSUES OF HEAD - NECK; Future    2. Elevated serum free T4 level  Check T3. Ultrasound to assess  - US-SOFT TISSUES OF HEAD - NECK; Future  - TRIIDOTHYRONINE; Future    3. Family history of colon cancer in mother  See #1    4. Family history of pancreatic cancer  See #1    Any change or worsening of signs or symptoms, " patient encouraged to follow-up or report to the emergency room for further evaluation. Patient understands and agrees.    Followup: Return if symptoms worsen or fail to improve.

## 2018-06-01 ENCOUNTER — HOSPITAL ENCOUNTER (OUTPATIENT)
Dept: LAB | Facility: MEDICAL CENTER | Age: 68
End: 2018-06-01
Attending: FAMILY MEDICINE
Payer: MEDICARE

## 2018-06-01 DIAGNOSIS — R97.0 ELEVATED CARCINOEMBRYONIC ANTIGEN (CEA): ICD-10-CM

## 2018-06-01 DIAGNOSIS — R79.89 ELEVATED SERUM FREE T4 LEVEL: ICD-10-CM

## 2018-06-01 LAB
ANION GAP SERPL CALC-SCNC: 12 MMOL/L (ref 0–11.9)
BUN SERPL-MCNC: 11 MG/DL (ref 8–22)
CALCIUM SERPL-MCNC: 9.5 MG/DL (ref 8.5–10.5)
CEA SERPL-MCNC: 4.9 NG/ML (ref 0–3)
CHLORIDE SERPL-SCNC: 97 MMOL/L (ref 96–112)
CO2 SERPL-SCNC: 23 MMOL/L (ref 20–33)
CREAT SERPL-MCNC: 0.88 MG/DL (ref 0.5–1.4)
GLUCOSE SERPL-MCNC: 85 MG/DL (ref 65–99)
POTASSIUM SERPL-SCNC: 3.4 MMOL/L (ref 3.6–5.5)
SODIUM SERPL-SCNC: 132 MMOL/L (ref 135–145)
T3 SERPL-MCNC: 203.5 NG/DL (ref 60–181)

## 2018-06-01 PROCEDURE — 80048 BASIC METABOLIC PNL TOTAL CA: CPT

## 2018-06-01 PROCEDURE — 36415 COLL VENOUS BLD VENIPUNCTURE: CPT

## 2018-06-01 PROCEDURE — 82378 CARCINOEMBRYONIC ANTIGEN: CPT

## 2018-06-01 PROCEDURE — 84480 ASSAY TRIIODOTHYRONINE (T3): CPT

## 2018-06-07 ENCOUNTER — HOSPITAL ENCOUNTER (OUTPATIENT)
Dept: RADIOLOGY | Facility: MEDICAL CENTER | Age: 68
End: 2018-06-07
Attending: FAMILY MEDICINE
Payer: MEDICARE

## 2018-06-07 DIAGNOSIS — R97.0 ELEVATED CARCINOEMBRYONIC ANTIGEN (CEA): ICD-10-CM

## 2018-06-07 DIAGNOSIS — R79.89 ELEVATED SERUM FREE T4 LEVEL: ICD-10-CM

## 2018-06-07 PROCEDURE — 74183 MRI ABD W/O CNTR FLWD CNTR: CPT

## 2018-06-07 PROCEDURE — 76536 US EXAM OF HEAD AND NECK: CPT

## 2018-06-07 PROCEDURE — A9579 GAD-BASE MR CONTRAST NOS,1ML: HCPCS | Performed by: FAMILY MEDICINE

## 2018-06-07 PROCEDURE — 700117 HCHG RX CONTRAST REV CODE 255: Performed by: FAMILY MEDICINE

## 2018-06-07 RX ADMIN — GADODIAMIDE 20 ML: 287 INJECTION INTRAVENOUS at 10:13

## 2018-06-08 ENCOUNTER — PATIENT MESSAGE (OUTPATIENT)
Dept: MEDICAL GROUP | Facility: MEDICAL CENTER | Age: 68
End: 2018-06-08

## 2018-06-08 DIAGNOSIS — R79.89 ELEVATED SERUM FREE T4 LEVEL: ICD-10-CM

## 2018-06-08 NOTE — TELEPHONE ENCOUNTER
From: Jomar Thomas  To: Pratibha Corral M.D.  Sent: 6/8/2018 2:42 PM PDT  Subject: Test Result Question    Pratibha,  As a result of my recent tests you suggested that I see an endocrinologist.  Who would you recommend?  Satya

## 2018-06-11 ENCOUNTER — PATIENT OUTREACH (OUTPATIENT)
Dept: HEALTH INFORMATION MANAGEMENT | Facility: OTHER | Age: 68
End: 2018-06-11

## 2018-06-11 NOTE — PROGRESS NOTES
Outcome: Left Message    Please transfer to Patient Outreach Team at [phone] when patient returns call.

## 2018-06-12 NOTE — PROGRESS NOTES
Attempt #:Final  Verify PCP: yes    Communication Preference Obtained: no // Pt declined to complete pre-planning over the phone.     Review Care Team: no    Annual Wellness Visit Scheduling  1. Scheduling Status:Scheduled      Care Gap Scheduling (Attempt to Schedule EACH Overdue Care Gap!)  Health Maintenance Due   Topic Date Due   • Annual Wellness Visit  1950   • IMM DTaP/Tdap/Td Vaccine (1 - Tdap) 06/22/1967   • IMM PNEUMOCOCCAL 65+ (ADULT) LOW/MEDIUM RISK SERIES (1 of 2 - PCV13) 06/22/2013       MyChart Activation: already active

## 2018-07-17 ENCOUNTER — OFFICE VISIT (OUTPATIENT)
Dept: MEDICAL GROUP | Facility: MEDICAL CENTER | Age: 68
End: 2018-07-17
Payer: MEDICARE

## 2018-07-17 VITALS
OXYGEN SATURATION: 94 % | TEMPERATURE: 98 F | WEIGHT: 182.5 LBS | HEIGHT: 69 IN | BODY MASS INDEX: 27.03 KG/M2 | SYSTOLIC BLOOD PRESSURE: 128 MMHG | DIASTOLIC BLOOD PRESSURE: 60 MMHG | HEART RATE: 91 BPM

## 2018-07-17 DIAGNOSIS — E77.8 OTHER DISORDERS OF GLYCOPROTEIN METABOLISM (HCC): ICD-10-CM

## 2018-07-17 DIAGNOSIS — G43.009 MIGRAINE WITHOUT AURA AND WITHOUT STATUS MIGRAINOSUS, NOT INTRACTABLE: ICD-10-CM

## 2018-07-17 DIAGNOSIS — R97.0 ELEVATED CARCINOEMBRYONIC ANTIGEN (CEA): ICD-10-CM

## 2018-07-17 DIAGNOSIS — Z86.69 HISTORY OF BELL'S PALSY: ICD-10-CM

## 2018-07-17 DIAGNOSIS — R79.89 ELEVATED SERUM FREE T4 LEVEL: ICD-10-CM

## 2018-07-17 DIAGNOSIS — H35.711 CENTRAL SEROUS CHORIORETINOPATHY OF RIGHT EYE: ICD-10-CM

## 2018-07-17 DIAGNOSIS — Z13.0 SCREENING FOR DEFICIENCY ANEMIA: ICD-10-CM

## 2018-07-17 DIAGNOSIS — Z13.220 SCREENING FOR HYPERLIPIDEMIA: ICD-10-CM

## 2018-07-17 DIAGNOSIS — R79.82 ELEVATED C-REACTIVE PROTEIN (CRP): ICD-10-CM

## 2018-07-17 DIAGNOSIS — Z13.6 ENCOUNTER FOR SCREENING FOR CARDIOVASCULAR DISORDERS: ICD-10-CM

## 2018-07-17 DIAGNOSIS — S39.012A STRAIN OF LUMBAR REGION, INITIAL ENCOUNTER: ICD-10-CM

## 2018-07-17 DIAGNOSIS — K21.9 GASTROESOPHAGEAL REFLUX DISEASE WITHOUT ESOPHAGITIS: ICD-10-CM

## 2018-07-17 DIAGNOSIS — Z13.1 SCREENING FOR DIABETES MELLITUS: ICD-10-CM

## 2018-07-17 DIAGNOSIS — R79.83 HOMOCYSTEINEMIA: ICD-10-CM

## 2018-07-17 DIAGNOSIS — J30.1 CHRONIC SEASONAL ALLERGIC RHINITIS DUE TO POLLEN: ICD-10-CM

## 2018-07-17 DIAGNOSIS — M51.36 DDD (DEGENERATIVE DISC DISEASE), LUMBAR: ICD-10-CM

## 2018-07-17 DIAGNOSIS — K52.9 INFLAMMATORY BOWEL DISEASE: ICD-10-CM

## 2018-07-17 DIAGNOSIS — Z00.00 MEDICARE ANNUAL WELLNESS VISIT, SUBSEQUENT: ICD-10-CM

## 2018-07-17 PROBLEM — D49.0 NEOPLASM OF DIGESTIVE SYSTEM: Status: RESOLVED | Noted: 2017-09-22 | Resolved: 2018-07-17

## 2018-07-17 PROBLEM — R53.83 OTHER FATIGUE: Status: RESOLVED | Noted: 2017-03-16 | Resolved: 2018-07-17

## 2018-07-17 PROCEDURE — G0438 PPPS, INITIAL VISIT: HCPCS | Performed by: FAMILY MEDICINE

## 2018-07-17 ASSESSMENT — ACTIVITIES OF DAILY LIVING (ADL): BATHING_REQUIRES_ASSISTANCE: 0

## 2018-07-17 ASSESSMENT — PATIENT HEALTH QUESTIONNAIRE - PHQ9: CLINICAL INTERPRETATION OF PHQ2 SCORE: 0

## 2018-07-17 ASSESSMENT — ENCOUNTER SYMPTOMS: GENERAL WELL-BEING: GOOD

## 2018-07-17 ASSESSMENT — PAIN SCALES - GENERAL: PAINLEVEL: 5=MODERATE PAIN

## 2018-07-17 NOTE — PROGRESS NOTES
Chief Complaint   Patient presents with   • Annual Wellness Visit         HPI:  Jomar is a 68 y.o. here for Medicare Annual Wellness Visit        Patient Active Problem List    Diagnosis Date Noted   • Strain of lumbar region 07/17/2018   • Elevated serum free T4 level 05/22/2018   • Elevated carcinoembryonic antigen (CEA) 02/15/2018   • Chronic seasonal allergic rhinitis due to pollen 02/15/2018   • Age-related cataract of both eyes 02/15/2018   • Gastroesophageal reflux disease without esophagitis 02/15/2018   • Inflammatory bowel disease 02/15/2018   • History of Bell's palsy 02/15/2018   • DDD (degenerative disc disease), lumbar 02/15/2018   • Elevated C-reactive protein (CRP) 02/15/2018   • Homocysteinemia (HCC) 02/15/2018   • Family history of colon cancer in mother 02/15/2018   • Family history of pancreatic cancer 02/15/2018   • Central serous retinopathy 10/17/2011       Current Outpatient Prescriptions   Medication Sig Dispense Refill   • Fluticasone Propionate (FLONASE NA) Spray  in nose.     • Triamcinolone Acetonide (NASACORT ALLERGY 24HR NA) Spray  in nose.     • DiphenhydrAMINE HCl (BENADRYL ALLERGY PO) Take  by mouth.     • raNITidine (ZANTAC) 150 MG Tab Take 150 mg by mouth 2 times a day.     • multivitamin (THERAGRAN) Tab Take 1 Tab by mouth every day.       No current facility-administered medications for this visit.         Patient is taking medications as noted in medication list.  Current supplements as per medication list.     Allergies: Seasonal    Current social contact/activities: Pt socializes with friends. Pt goes out to eat and movies with spouse.     Is patient current with immunizations? No, due for SHINGRIX (Shingles). Patient is interested in receiving NONE today.    He  reports that he quit smoking about 17 years ago. His smoking use included Cigarettes. He has a 20.00 pack-year smoking history. He has never used smokeless tobacco. He reports that he drinks about 0.6 oz of alcohol  per week . He reports that he does not use drugs.  Counseling given: Not Answered        DPA/Advanced directive: Patient has Advanced Directive, but it is not on file. Instructed to bring in a copy to scan into their chart.    ROS:    Gait: Uses no assistive device   Ostomy: No   Other tubes: No   Amputations: No   Chronic oxygen use No   Last eye exam January 2018   Wears hearing aids: No   : Denies any urinary leakage during the last 6 months      Depression Screening    Little interest or pleasure in doing things?  0 - not at all  Feeling down, depressed, or hopeless? 0 - not at all  Patient Health Questionnaire Score: 0    If depressive symptoms identified deferred to follow up visit unless specifically addressed in assessment and plan.    Interpretation of PHQ-9 Total Score   Score Severity   1-4 No Depression   5-9 Mild Depression   10-14 Moderate Depression   15-19 Moderately Severe Depression   20-27 Severe Depression    Screening for Cognitive Impairment    Three Minute Recall (leader, season, table)  1/3    Monster clock face with all 12 numbers and set the hands to show 10 past 11.  Yes 5/5  If cognitive concerns identified, deferred for follow up unless specifically addressed in assessment and plan.    Fall Risk Assessment    Has the patient had two or more falls in the last year or any fall with injury in the last year?  No  If fall risk identified, deferred for follow up unless specifically addressed in assessment and plan.    Safety Assessment    Throw rugs on floor.  Yes  Handrails on all stairs.  Yes  Good lighting in all hallways.  Yes  Difficulty hearing.  No  Patient counseled about all safety risks that were identified.    Functional Assessment ADLs    Are there any barriers preventing you from cooking for yourself or meeting nutritional needs?  No.    Are there any barriers preventing you from driving safely or obtaining transportation?  No.    Are there any barriers preventing you from using a  telephone or calling for help?  No.    Are there any barriers preventing you from shopping?  No.    Are there any barriers preventing you from taking care of your own finances?  No.    Are there any barriers preventing you from managing your medications?  No.    Are there any barriers preventing you from showering, bathing or dressing yourself?  No.    Are you currently engaging in any exercise or physical activity?  Yes.  Pt walks occassionally  What is your perception of your health?  Good.    Health Maintenance Summary                Annual Wellness Visit Overdue 1950     IMM INFLUENZA Next Due 9/1/2018      Done 10/12/2017 Imm Admin: Influenza Vaccine Quad Inj (Preserved)     Patient has more history with this topic...    COLONOSCOPY Next Due 9/7/2022      Done 9/7/2017 REFERRAL TO GI FOR COLONOSCOPY     Patient has more history with this topic...    IMM DTaP/Tdap/Td Vaccine Next Due 9/24/2025      Done 9/24/2015 Imm Admin: Tdap Vaccine     Patient has more history with this topic...          Patient Care Team:  Pratibha Corral M.D. as PCP - General (Family Medicine)  Kika Miranda M.D. as Consulting Physician (Ophthalmology)  [facility] as Consulting Physician    Social History   Substance Use Topics   • Smoking status: Former Smoker     Packs/day: 1.00     Years: 20.00     Types: Cigarettes     Quit date: 2/15/2001   • Smokeless tobacco: Never Used   • Alcohol use 0.6 oz/week     1 Glasses of wine per week      Comment: weekly     Family History   Problem Relation Age of Onset   • Cancer Mother         breast and adenocarcinoma of colon   • Stroke Father    • Cancer Brother         pancreatic cancer     He  has a past medical history of Arthritis; Cataract; Central serous retinopathy; Central serous retinopathy (10/17/2011); Cold (09/2017); Heart burn; and Hemorrhagic disorder (HCC).   Past Surgical History:   Procedure Laterality Date   • GASTROSCOPY  9/22/2017    Procedure: GASTROSCOPY-WITH POSSIBLE 45  "Kiswahili SAVORY DILATION OVER GUIDEWIRE;  Surgeon: Finn Bernard M.D.;  Location: [facility];  Service: Gastroenterology   • EGD W/ENDOSCOPIC ULTRASOUND  9/22/2017    Procedure: EGD W/ENDOSCOPIC ULTRASOUND- UPPER, RADIAL;  Surgeon: Finn Bernard M.D.;  Location: [facility];  Service: Gastroenterology   • GASTROSCOPY WITH BIOPSY  9/22/2017    Procedure: GASTROSCOPY WITH BIOPSY- SIDE VIEWING SCOPE BIOPSIES OF AMPULLA;  Surgeon: Finn Bernard M.D.;  Location: [facility];  Service: Gastroenterology   • TOE ARTHROPLASTY  6/26/2013    Performed by Tirso Colón D.P.M. at [facility] (RIGHT great toe)   • UMBILICAL HERNIA REPAIR  2006   • OTHER Right 1969    Tendon repair RIGHT index finger   • CERVICAL LAMINECTOMY POSTERIOR      C5 - 1984   • TONSILLECTOMY AND ADENOIDECTOMY             Exam:     Blood pressure 128/60, pulse 91, temperature 36.7 °C (98 °F), height 1.753 m (5' 9\"), weight 82.8 kg (182 lb 8 oz), SpO2 94 %. Body mass index is 26.95 kg/m².    Hearing good.    Dentition good  Alert, oriented in no acute distress.  Eye contact is good, speech goal directed, affect calm  Constitutional: Alert, no distress.  Skin: Warm, dry, good turgor, no rashes in visible areas.  Eye: Equal, round and reactive, conjunctiva clear, lids normal.  ENMT: Lips without lesions, good dentition, oropharynx clear.  Neck: Trachea midline, no masses, no thyromegaly. No cervical or supraclavicular lymphadenopathy  Respiratory: Unlabored respiratory effort, lungs clear to auscultation, no wheezes, no ronchi.  Cardiovascular: Normal S1, S2, RRR, no murmur, no edema.  Abdomen: Soft, non-tender, no masses, no hepatosplenomegaly.  Psych: Alert and oriented x3, normal affect and mood.  SPINE: No significant spinal curvature on forward bend. Mild tenderness in paraspinous muscles lumbar spine without current spasm. SLT negative. DTR 2+ patella, 1+ achilles bilaterally. Strength 5/5 " proximal and distal LE.  No pain with stress of SI. Full hip ROM. Poor hamstring flexibility. No symptoms with axial loading.         Assessment and Plan. The following treatment and monitoring plan is recommended:    1. Medicare annual wellness visit, subsequent  Routine anticipatory guidance.  No special services needed.  - Initial Wellness Visit - Includes PPPS ()    2. Migraine without aura and without status migrainosus, not intractable  This is a chronic medical condition that is currently stable    - Initial Wellness Visit - Includes PPPS ()    3. Inflammatory bowel disease  This is a chronic medical condition that is currently stable  Follow-up with gastroenterology  - Initial Wellness Visit - Includes PPPS ()    4. Homocysteinemia (HCC)  This is a chronic medical condition that is currently stable  Recheck labs  - Initial Wellness Visit - Includes PPPS ()  - LIPID PROFILE; Future  - HOMOCYSTEINE; Future    5. History of Bell's palsy  This is a chronic medical condition that is currently stable    - Initial Wellness Visit - Includes PPPS ()    6. Gastroesophageal reflux disease without esophagitis  This is a chronic medical condition that is currently stable  Continue PPI  - Initial Wellness Visit - Includes PPPS ()  - COMP METABOLIC PANEL; Future    7. Elevated serum free T4 level  This is a chronic medical condition that is currently stable  Recheck thyroid labs  - Initial Wellness Visit - Includes PPPS ()  - TSH; Future  - FREE THYROXINE; Future  - TRIIDOTHYRONINE; Future  - ANTITHYROGLOBULIN AB; Future  - THYROID PEROXIDASE  (TPO) AB; Future    8. Elevated C-reactive protein (CRP)  This is a chronic medical condition that is currently stable  Recheck labs  - Initial Wellness Visit - Includes PPPS ()  - HOMOCYSTEINE; Future  - CRP HIGH SENSITIVE (CARDIAC); Future    9. Elevated carcinoembryonic antigen (CEA)  Negative workup thus far.  Recheck CEA  - Initial  Wellness Visit - Includes PPPS ()  - CEA; Future    10. DDD (degenerative disc disease), lumbar  This is a chronic medical condition that is currently stable  Refer to physical therapy  - Initial Wellness Visit - Includes PPPS ()  - REFERRAL TO PHYSICAL THERAPY Reason for Therapy: Eval/Treat/Report    11. Chronic seasonal allergic rhinitis due to pollen  Symptomatic care  - Initial Wellness Visit - Includes PPPS ()    12. Central serous chorioretinopathy of right eye  Follow-up with ophthalmology as scheduled  - Initial Wellness Visit - Includes PPPS ()    13. Strain of lumbar region, initial encounter  See #10  - Initial Wellness Visit - Includes PPPS ()  - REFERRAL TO PHYSICAL THERAPY Reason for Therapy: Eval/Treat/Report    14. Screening for deficiency anemia  Screening labs ordered.  Await results for counseling.    - CBC WITH DIFFERENTIAL; Future    15. Screening for diabetes mellitus  Screening labs ordered.  Await results for counseling.    - COMP METABOLIC PANEL; Future    16. Screening for hyperlipidemia  Screening labs ordered.  Await results for counseling.    - LIPID PROFILE; Future    17. Encounter for screening for cardiovascular disorders   Screening labs ordered.  Await results for counseling.    - LIPID PROFILE; Future  - CRP HIGH SENSITIVE (CARDIAC); Future    18. Other disorders of glycoprotein metabolism (HCC)   Screening labs ordered.  Await results for counseling.    - HOMOCYSTEINE; Future          Services suggested: No services needed at this time  Health Care Screening recommendations as per orders if indicated.  Referrals offered: PT/OT/Nutrition counseling/Behavioral Health/Smoking cessation as per orders if indicated.    Discussion today about general wellness and lifestyle habits:    · Prevent falls and reduce trip hazards; Cautioned about securing or removing rugs.  · Have a working fire alarm and carbon monoxide detector;   · Engage in regular physical activity  and social activities       Follow-up: Return in about 1 year (around 7/17/2019).

## 2018-07-27 ENCOUNTER — HOSPITAL ENCOUNTER (OUTPATIENT)
Dept: LAB | Facility: MEDICAL CENTER | Age: 68
End: 2018-07-27
Attending: FAMILY MEDICINE
Payer: MEDICARE

## 2018-07-27 DIAGNOSIS — R79.82 ELEVATED C-REACTIVE PROTEIN (CRP): ICD-10-CM

## 2018-07-27 DIAGNOSIS — Z13.6 ENCOUNTER FOR SCREENING FOR CARDIOVASCULAR DISORDERS: ICD-10-CM

## 2018-07-27 DIAGNOSIS — E77.8 OTHER DISORDERS OF GLYCOPROTEIN METABOLISM (HCC): ICD-10-CM

## 2018-07-27 DIAGNOSIS — Z13.220 SCREENING FOR HYPERLIPIDEMIA: ICD-10-CM

## 2018-07-27 DIAGNOSIS — R79.83 HOMOCYSTEINEMIA: ICD-10-CM

## 2018-07-27 DIAGNOSIS — Z13.0 SCREENING FOR DEFICIENCY ANEMIA: ICD-10-CM

## 2018-07-27 DIAGNOSIS — R97.0 ELEVATED CARCINOEMBRYONIC ANTIGEN (CEA): ICD-10-CM

## 2018-07-27 DIAGNOSIS — Z13.1 SCREENING FOR DIABETES MELLITUS: ICD-10-CM

## 2018-07-27 DIAGNOSIS — K21.9 GASTROESOPHAGEAL REFLUX DISEASE WITHOUT ESOPHAGITIS: ICD-10-CM

## 2018-07-27 DIAGNOSIS — R79.89 ELEVATED SERUM FREE T4 LEVEL: ICD-10-CM

## 2018-07-27 LAB
ALBUMIN SERPL BCP-MCNC: 4.2 G/DL (ref 3.2–4.9)
ALBUMIN/GLOB SERPL: 1.6 G/DL
ALP SERPL-CCNC: 95 U/L (ref 30–99)
ALT SERPL-CCNC: 23 U/L (ref 2–50)
ANION GAP SERPL CALC-SCNC: 9 MMOL/L (ref 0–11.9)
AST SERPL-CCNC: 30 U/L (ref 12–45)
BASOPHILS # BLD AUTO: 0.6 % (ref 0–1.8)
BASOPHILS # BLD: 0.03 K/UL (ref 0–0.12)
BILIRUB SERPL-MCNC: 0.6 MG/DL (ref 0.1–1.5)
BUN SERPL-MCNC: 11 MG/DL (ref 8–22)
CALCIUM SERPL-MCNC: 9.4 MG/DL (ref 8.5–10.5)
CEA SERPL-MCNC: 10.3 NG/ML (ref 0–3)
CHLORIDE SERPL-SCNC: 101 MMOL/L (ref 96–112)
CHOLEST SERPL-MCNC: 212 MG/DL (ref 100–199)
CO2 SERPL-SCNC: 27 MMOL/L (ref 20–33)
CREAT SERPL-MCNC: 0.93 MG/DL (ref 0.5–1.4)
CRP SERPL HS-MCNC: 5.1 MG/L (ref 0–7.5)
EOSINOPHIL # BLD AUTO: 0.46 K/UL (ref 0–0.51)
EOSINOPHIL NFR BLD: 8.6 % (ref 0–6.9)
ERYTHROCYTE [DISTWIDTH] IN BLOOD BY AUTOMATED COUNT: 55.9 FL (ref 35.9–50)
GLOBULIN SER CALC-MCNC: 2.7 G/DL (ref 1.9–3.5)
GLUCOSE SERPL-MCNC: 91 MG/DL (ref 65–99)
HCT VFR BLD AUTO: 44.5 % (ref 42–52)
HCYS SERPL-SCNC: 14.93 UMOL/L
HDLC SERPL-MCNC: 103 MG/DL
HGB BLD-MCNC: 14.9 G/DL (ref 14–18)
IMM GRANULOCYTES # BLD AUTO: 0.01 K/UL (ref 0–0.11)
IMM GRANULOCYTES NFR BLD AUTO: 0.2 % (ref 0–0.9)
LDLC SERPL CALC-MCNC: 99 MG/DL
LYMPHOCYTES # BLD AUTO: 0.75 K/UL (ref 1–4.8)
LYMPHOCYTES NFR BLD: 13.9 % (ref 22–41)
MCH RBC QN AUTO: 32.5 PG (ref 27–33)
MCHC RBC AUTO-ENTMCNC: 33.5 G/DL (ref 33.7–35.3)
MCV RBC AUTO: 96.9 FL (ref 81.4–97.8)
MONOCYTES # BLD AUTO: 0.64 K/UL (ref 0–0.85)
MONOCYTES NFR BLD AUTO: 11.9 % (ref 0–13.4)
NEUTROPHILS # BLD AUTO: 3.49 K/UL (ref 1.82–7.42)
NEUTROPHILS NFR BLD: 64.8 % (ref 44–72)
NRBC # BLD AUTO: 0 K/UL
NRBC BLD-RTO: 0 /100 WBC
PLATELET # BLD AUTO: 205 K/UL (ref 164–446)
PMV BLD AUTO: 9.5 FL (ref 9–12.9)
POTASSIUM SERPL-SCNC: 3.9 MMOL/L (ref 3.6–5.5)
PROT SERPL-MCNC: 6.9 G/DL (ref 6–8.2)
RBC # BLD AUTO: 4.59 M/UL (ref 4.7–6.1)
SODIUM SERPL-SCNC: 137 MMOL/L (ref 135–145)
T3 SERPL-MCNC: 89 NG/DL (ref 60–181)
T4 FREE SERPL-MCNC: 1.06 NG/DL (ref 0.53–1.43)
THYROPEROXIDASE AB SERPL-ACNC: 10.8 IU/ML (ref 0–9)
TRIGL SERPL-MCNC: 48 MG/DL (ref 0–149)
TSH SERPL DL<=0.005 MIU/L-ACNC: 2.16 UIU/ML (ref 0.38–5.33)
WBC # BLD AUTO: 5.4 K/UL (ref 4.8–10.8)

## 2018-07-27 PROCEDURE — 83090 ASSAY OF HOMOCYSTEINE: CPT

## 2018-07-27 PROCEDURE — 80061 LIPID PANEL: CPT

## 2018-07-27 PROCEDURE — 80053 COMPREHEN METABOLIC PANEL: CPT

## 2018-07-27 PROCEDURE — 85025 COMPLETE CBC W/AUTO DIFF WBC: CPT

## 2018-07-27 PROCEDURE — 36415 COLL VENOUS BLD VENIPUNCTURE: CPT

## 2018-07-27 PROCEDURE — 84443 ASSAY THYROID STIM HORMONE: CPT

## 2018-07-27 PROCEDURE — 86141 C-REACTIVE PROTEIN HS: CPT

## 2018-07-27 PROCEDURE — 86376 MICROSOMAL ANTIBODY EACH: CPT

## 2018-07-27 PROCEDURE — 86800 THYROGLOBULIN ANTIBODY: CPT

## 2018-07-27 PROCEDURE — 82378 CARCINOEMBRYONIC ANTIGEN: CPT

## 2018-07-27 PROCEDURE — 84439 ASSAY OF FREE THYROXINE: CPT

## 2018-07-27 PROCEDURE — 84480 ASSAY TRIIODOTHYRONINE (T3): CPT

## 2018-07-29 LAB — THYROGLOB AB SERPL-ACNC: <0.9 IU/ML (ref 0–4)

## 2018-08-07 ENCOUNTER — PATIENT MESSAGE (OUTPATIENT)
Dept: MEDICAL GROUP | Facility: MEDICAL CENTER | Age: 68
End: 2018-08-07

## 2018-08-07 DIAGNOSIS — Z12.5 SCREENING FOR PROSTATE CANCER: ICD-10-CM

## 2018-08-08 NOTE — TELEPHONE ENCOUNTER
From: Jomar Thomas  To: Pratibha Corral M.D.  Sent: 8/7/2018 5:39 PM PDT  Subject: Non-Urgent Medical Question    Pratibha,  We forgot to do a PSA and a Hemoglobin A1C on my annual Medicare labs.   I can do these at any time. My next appointment with you is on August 28.  Satya

## 2018-08-16 ENCOUNTER — PHYSICAL THERAPY (OUTPATIENT)
Dept: PHYSICAL THERAPY | Facility: MEDICAL CENTER | Age: 68
End: 2018-08-16
Attending: FAMILY MEDICINE
Payer: MEDICARE

## 2018-08-16 ENCOUNTER — HOSPITAL ENCOUNTER (OUTPATIENT)
Dept: LAB | Facility: MEDICAL CENTER | Age: 68
End: 2018-08-16
Attending: FAMILY MEDICINE
Payer: MEDICARE

## 2018-08-16 DIAGNOSIS — M51.36 DDD (DEGENERATIVE DISC DISEASE), LUMBAR: ICD-10-CM

## 2018-08-16 DIAGNOSIS — S39.012A STRAIN OF LUMBAR REGION, INITIAL ENCOUNTER: ICD-10-CM

## 2018-08-16 DIAGNOSIS — Z12.5 SCREENING FOR PROSTATE CANCER: ICD-10-CM

## 2018-08-16 LAB — PSA SERPL-MCNC: 3.16 NG/ML (ref 0–4)

## 2018-08-16 PROCEDURE — 97162 PT EVAL MOD COMPLEX 30 MIN: CPT

## 2018-08-16 PROCEDURE — G8990 OTHER PT/OT CURRENT STATUS: HCPCS | Mod: CK

## 2018-08-16 PROCEDURE — 97012 MECHANICAL TRACTION THERAPY: CPT

## 2018-08-16 PROCEDURE — 36415 COLL VENOUS BLD VENIPUNCTURE: CPT | Mod: GA

## 2018-08-16 PROCEDURE — G8991 OTHER PT/OT GOAL STATUS: HCPCS | Mod: CJ

## 2018-08-16 PROCEDURE — 84153 ASSAY OF PSA TOTAL: CPT | Mod: GA

## 2018-08-16 ASSESSMENT — ENCOUNTER SYMPTOMS
PAIN SCALE: 3
PAIN SCALE AT HIGHEST: 7
PAIN SCALE AT LOWEST: 3
EXACERBATING FACTORS: BENDING FORWARD

## 2018-08-16 NOTE — OP THERAPY EVALUATION
"  Outpatient Physical Therapy  INITIAL EVALUATION    [facility] Outpatient Physical Therapy  [address]  [place] [address]  Phone:  [phone]  Fax:  [phone]    Date of Evaluation: 2018    Patient: Jomar Thomas  YOB: 1950  MRN: [id number]     Referring Provider: Pratibha Corral M.D.  [address]  [address]  [place] [address]   Referring Diagnosis DDD (degenerative disc disease), lumbar [M51.36];Strain of lumbar region, initial encounter [S39.012A]     Time Calculation  Start time: 0940  Stop time: 1030 Time Calculation (min): 50 minutes     Physical Therapy Occurrence Codes    Date of onset of impairment:  17   Date physical therapy care plan established or reviewed:  18   Date physical therapy treatment started:  18          Chief Complaint: Back Pain    Visit Diagnoses     ICD-10-CM   1. DDD (degenerative disc disease), lumbar M51.36   2. Strain of lumbar region, initial encounter S39.012A         Subjective   History of Present Illness:     History of chief complaint:  Pt reports that he has had back pain on/off for several years, but he reports significantly increased pain in his back for the last month. He tries to avoid any bending or strain on his back. Pt reports that he mostly knows how to manage his pain, including \"old camila flexion exercises.\" His pain is localized right in the lumbar spine with stiffness/tightness primarily and occasional referral down his legs with numbness and tingling. Pt does awaken with pain if he turns wrong.     Pain:     Current pain rating:  3    At best pain rating:  3    At worst pain ratin    Aggravating factors:  Bending forward     Relieving factors:  Seldom uses heat or ice    Activity Tolerance:     Work:  Psychiatry- only sees outpatient    Diagnostic Tests:     MRI studies: abnormal (DDD and disc bulge in )      Treatments:     Treatments to date:  Injections, " "inversion table    Patient Goals:     Patient goals for therapy:  Pt would like to get his back to return to his baseline of low grade symptoms.       Past Medical History:   Diagnosis Date   • Arthritis     DJD- Lumbar   • Cataract     Bilateral (has not been surgically corrected)   • Central serous retinopathy     right eye   • Central serous retinopathy 10/17/2011   • Cold 09/2017    Sinus infection- treated with antibiotics   • Heart burn    • Hemorrhagic disorder (HCC)     \"nosebleeds\"     Past Surgical History:   Procedure Laterality Date   • GASTROSCOPY  9/22/2017    Procedure: GASTROSCOPY-WITH POSSIBLE 45 Albanian SAVORY DILATION OVER GUIDEWIRE;  Surgeon: Finn Bernard M.D.;  Location: [facility];  Service: Gastroenterology   • EGD W/ENDOSCOPIC ULTRASOUND  9/22/2017    Procedure: EGD W/ENDOSCOPIC ULTRASOUND- UPPER, RADIAL;  Surgeon: Finn Bernard M.D.;  Location: [facility];  Service: Gastroenterology   • GASTROSCOPY WITH BIOPSY  9/22/2017    Procedure: GASTROSCOPY WITH BIOPSY- SIDE VIEWING SCOPE BIOPSIES OF AMPULLA;  Surgeon: Finn Bernard M.D.;  Location: [facility];  Service: Gastroenterology   • TOE ARTHROPLASTY  6/26/2013    Performed by Tirso Col[facility] D.P.M. at [facility] (RIGHT great toe)   • UMBILICAL HERNIA REPAIR  2006   • OTHER Right 1969    Tendon repair RIGHT index finger   • CERVICAL LAMINECTOMY POSTERIOR      C5 - 1984   • TONSILLECTOMY AND ADENOIDECTOMY         Precautions:       Objective   Observation and functional movement:  Rounded shoulders, slumped posture    Range of motion and strength:    L/S AROM: significantly below normal: flexion=FT to mid-thigh, extension=10 degrees, SB=mid-thigh bilaterally  Hip AROM: limited with \"grimace\" throughout motions  Myotomes grossly 4/5, hip flexion bilaterally 3+/5 (likely more limited due to apprehension/pain than true weakness with hip flexion)    Sensation and reflexes:     " "Sensation is intact.      Reflexes are normal and symmetrical.    Palpation and joint mobility:     TTP throughout thoracolumbar musculature and joints  *Unable to gain thorough/adequate assessment of lumbar intervertebral mobility as patient unable to lay on his stomach    Special tests:      (+)slump and (+)SLR bilaterally with reproduction of lower back pain  (+) hip flexor/ITB/glutes/HS tightness    Gait:      Little hip/pelvic translation          Therapeutic Exercises (CPT 39716):       Therapeutic Exercise Summary: Access Code: [email or web address]   URL: [email or web address]   Date: 08/16/2018   Prepared by: Lydia Carlton      Exercises  · Hooklying Single Knee to Chest Stretch - 3-4 reps - 20-30\" hold - 1x daily - 7x weekly  · Supine Piriformis Stretch with Foot on Ground - 3-4 reps - 20-30\" hold - 1x daily - 7x weekly  · Supine Figure 4 Piriformis Stretch - 3-4 reps - 20-30\" hold - 1x daily - 7x weekly  · Lower Trunk Rotation with Compression Garment - 20-30 reps - 1x daily - 7x weekly  · Supine Posterior Pelvic Tilt - 20-30 reps - 1x daily - 7x weekly  · Supine Anterior Pelvic Tilt - 20-30 reps - 1x daily - 7x weekly        Therapeutic Treatments and Modalities:     1. Mechanical Traction (CPT 72150), Lumbar: 80/60#, 60/20\" +MHP, x15 minutes    Time-based treatments/modalities:          Assessment, Response and Plan:   Assessment details:  Pt is a 68-yo M presenting with chronic low back pain related to degeneration and disc herniations. He has limited lumbar AROM, decreased bilateral hip flexibility, constant low back pain and intermittent lower extremity radiculopathy, and poor postural awareness/core stability. Pt is limited in his ability to tolerate prolonged sitting, standing, walking and his ability to sleep through the night. Pt will benefit from skilled PT services to restore full functional participation in all of these activities.   Goals:   Short Term Goals:   Pt will be independent/compliant " with all HEP.  Pt will demo 10 degree improvement in L/S AROM across all planes.   Short term goal time span:  2-4 weeks      Long Term Goals:    Pt will report <2/10 pain with all ADL/IADL.  Pt will report no occurrence of LE symptoms >2 weeks.   Pt will be able to stand/walk >1 hour with pain <2/10.  Pt will score <10% disability on RMQ.  Long term goal time span:  6-8 weeks    Plan:   Planned therapy interventions:  E Stim Unattended (CPT 36886), Hot or Cold Pack Therapy (CPT 13645), Manual Therapy (CPT 63841), Neuromuscular Re-education (CPT 20188), Mechanical Traction (CPT 23617), Therapeutic Activities (CPT 41306) and Therapeutic Exercise (CPT 22256)  Frequency:  2x week  Duration in weeks:  8  Plan details:  2x/wk for 8 weeks to reduce pain and promote increased tolerance to typical ADL/IADL.      Functional Limitation G-Codes and Severity Modifiers  Jass Feliciano Low Back Pain and Disability Score: 54.17   Current: Other PT / OT Primary: Current (): CK   Goal: Other PT/OT Primary: Goal (): CJ     Referring provider co-signature:  I have reviewed this plan of care and my co-signature certifies the need for services.  Certification Dates:   From 08/16/18     To 10/16/18    Physician Signature: ________________________________ Date: ______________

## 2018-08-20 ENCOUNTER — PHYSICAL THERAPY (OUTPATIENT)
Dept: PHYSICAL THERAPY | Facility: MEDICAL CENTER | Age: 68
End: 2018-08-20
Attending: FAMILY MEDICINE
Payer: MEDICARE

## 2018-08-20 DIAGNOSIS — M51.36 DDD (DEGENERATIVE DISC DISEASE), LUMBAR: ICD-10-CM

## 2018-08-20 PROCEDURE — 97012 MECHANICAL TRACTION THERAPY: CPT

## 2018-08-20 NOTE — OP THERAPY DAILY TREATMENT
Outpatient Physical Therapy  DAILY TREATMENT     [facility] Outpatient Physical Therapy  [address]  [place] [address]  Phone:  [phone]  Fax:  [phone]    Date: 08/20/2018    Patient: Jomar Thomas  YOB: 1950  MRN: [id number]     Time Calculation  Start time: 0330  Stop time: 0400 Time Calculation (min): 30 minutes     Chief Complaint: No chief complaint on file.    Visit #: 2    SUBJECTIVE:  Sore after traction but feels like it helped    OBJECTIVE:  Current objective measures:     Pain on palpation L L 2345    Exercises/Treatment  Time-based treatments/modalities:   REIL x 10  SCS L   Sacral release  Mechanical traction 90/70 x20 mins with hot pack       Pain rating before treatment: 2  Pain rating after treatment: 1    ASSESSMENT:   Response to treatment:     PLAN/RECOMMENDATIONS:   Plan for treatment: therapy treatment to continue next visit.  Planned interventions for next visit: continue with current treatment.

## 2018-08-27 ENCOUNTER — TELEPHONE (OUTPATIENT)
Dept: MEDICAL GROUP | Facility: MEDICAL CENTER | Age: 68
End: 2018-08-27

## 2018-08-27 NOTE — TELEPHONE ENCOUNTER
ESTABLISHED PATIENT PRE-VISIT PLANNING     Note: Patient will not be contacted if there is no indication to call.     1.  Reviewed notes from the last few office visits within the medical group: Yes 7/17/18, 5/22/18    2.  If any orders were placed at last visit or intended to be done for this visit (i.e. 6 mos follow-up), do we have Results/Consult Notes?        •  Labs - Labs ordered, completed on 7/27/18, 6/1/18 and results are in chart.   Note: If patient appointment is for lab review and patient did not complete labs, check with provider if OK to reschedule patient until labs completed.       •  Imaging - Imaging ordered, completed and results are in chart.       •  Referrals - PT 7/17/18-Scheduled    3. Is this appointment scheduled as a Hospital Follow-Up? No    4.  Immunizations were updated in Epic using WebIZ?: Epic matches WebIZ       •  Web Iz Recommendations: FLU, HEPATITIS A , HEPATITIS B, MMR , TD and ZOSTAVAX (Shingles)    5.  Patient is due for the following Health Maintenance Topics:   Health Maintenance Due   Topic Date Due   • IMM ZOSTER VACCINES (2 of 3) 07/26/2012   • IMM INFLUENZA (1) 09/01/2018     6.  MDX printed for Provider? NO    7.  Patient was NOT informed to arrive 15 min prior to their scheduled appointment and bring in their medication bottles.

## 2018-08-28 ENCOUNTER — OFFICE VISIT (OUTPATIENT)
Dept: MEDICAL GROUP | Facility: MEDICAL CENTER | Age: 68
End: 2018-08-28
Payer: MEDICARE

## 2018-08-28 VITALS
OXYGEN SATURATION: 98 % | DIASTOLIC BLOOD PRESSURE: 72 MMHG | RESPIRATION RATE: 20 BRPM | TEMPERATURE: 97.5 F | BODY MASS INDEX: 27.4 KG/M2 | SYSTOLIC BLOOD PRESSURE: 132 MMHG | HEIGHT: 69 IN | HEART RATE: 75 BPM | WEIGHT: 185 LBS

## 2018-08-28 DIAGNOSIS — L20.84 INTRINSIC ECZEMA: ICD-10-CM

## 2018-08-28 DIAGNOSIS — R97.20 ELEVATED PSA: ICD-10-CM

## 2018-08-28 DIAGNOSIS — R97.0 ELEVATED CARCINOEMBRYONIC ANTIGEN (CEA): ICD-10-CM

## 2018-08-28 DIAGNOSIS — I51.7 MILD CONCENTRIC LEFT VENTRICULAR HYPERTROPHY (LVH): ICD-10-CM

## 2018-08-28 DIAGNOSIS — R60.9 PERIPHERAL EDEMA: ICD-10-CM

## 2018-08-28 PROBLEM — R60.0 PERIPHERAL EDEMA: Status: ACTIVE | Noted: 2018-08-28

## 2018-08-28 PROCEDURE — 99214 OFFICE O/P EST MOD 30 MIN: CPT | Performed by: FAMILY MEDICINE

## 2018-08-28 RX ORDER — TRIAMCINOLONE ACETONIDE 1 MG/G
CREAM TOPICAL
Qty: 80 G | Refills: 1 | Status: SHIPPED | OUTPATIENT
Start: 2018-08-28 | End: 2019-07-02

## 2018-08-30 ENCOUNTER — PHYSICAL THERAPY (OUTPATIENT)
Dept: PHYSICAL THERAPY | Facility: MEDICAL CENTER | Age: 68
End: 2018-08-30
Attending: FAMILY MEDICINE
Payer: MEDICARE

## 2018-08-30 ENCOUNTER — HOSPITAL ENCOUNTER (OUTPATIENT)
Dept: LAB | Facility: MEDICAL CENTER | Age: 68
End: 2018-08-30
Attending: FAMILY MEDICINE
Payer: MEDICARE

## 2018-08-30 DIAGNOSIS — S39.012A STRAIN OF LUMBAR REGION, INITIAL ENCOUNTER: ICD-10-CM

## 2018-08-30 DIAGNOSIS — R97.20 ELEVATED PSA: ICD-10-CM

## 2018-08-30 DIAGNOSIS — R97.0 ELEVATED CARCINOEMBRYONIC ANTIGEN (CEA): ICD-10-CM

## 2018-08-30 DIAGNOSIS — M51.36 DDD (DEGENERATIVE DISC DISEASE), LUMBAR: ICD-10-CM

## 2018-08-30 LAB — CEA SERPL-MCNC: 8.3 NG/ML (ref 0–3)

## 2018-08-30 PROCEDURE — 97012 MECHANICAL TRACTION THERAPY: CPT

## 2018-08-30 PROCEDURE — 97110 THERAPEUTIC EXERCISES: CPT

## 2018-08-30 PROCEDURE — 36415 COLL VENOUS BLD VENIPUNCTURE: CPT

## 2018-08-30 PROCEDURE — 84153 ASSAY OF PSA TOTAL: CPT

## 2018-08-30 PROCEDURE — 97140 MANUAL THERAPY 1/> REGIONS: CPT | Mod: XU

## 2018-08-30 PROCEDURE — 82378 CARCINOEMBRYONIC ANTIGEN: CPT

## 2018-08-30 PROCEDURE — 84154 ASSAY OF PSA FREE: CPT

## 2018-09-01 LAB
PSA FREE MFR SERPL: 29 %
PSA FREE SERPL-MCNC: 1.2 NG/ML
PSA SERPL-MCNC: 4.2 NG/ML (ref 0–4)

## 2018-09-04 NOTE — PROGRESS NOTES
Subjective:     Chief Complaint   Patient presents with   • Follow-Up     labs       Jomar Thomas is a 68 y.o. male here today for evaluation and management of:    Elevated carcinoembryonic antigen (CEA)  Patient has had an elevated CEA.  He has had negative colonoscopy.  We have done CTs of the abdomen.  His PSA was  slightly elevated.    Intrinsic eczema  Patient has a history of eczema.  He would like a refill of a cortisone cream to treat this.  He currently has patches on his anterior chest.    Peripheral edema  Patient gets some  slight pitting edema of his lower extremities when he is been up on his feet all day.  This seems to be worsening this summer.       Allergies   Allergen Reactions   • Seasonal      Itchy eyes, blocked nose, sneezing       Current medicines (including changes today)  Current Outpatient Prescriptions   Medication Sig Dispense Refill   • triamcinolone acetonide (KENALOG) 0.1 % Cream Apply to affected area BID until clear 80 g 1   • Fluticasone Propionate (FLONASE NA) Spray  in nose.     • Triamcinolone Acetonide (NASACORT ALLERGY 24HR NA) Spray  in nose.     • DiphenhydrAMINE HCl (BENADRYL ALLERGY PO) Take  by mouth.     • raNITidine (ZANTAC) 150 MG Tab Take 150 mg by mouth 2 times a day.     • multivitamin (THERAGRAN) Tab Take 1 Tab by mouth every day.       No current facility-administered medications for this visit.        He  has a past medical history of Arthritis; Cataract; Central serous retinopathy; Central serous retinopathy (10/17/2011); Cold (09/2017); Heart burn; and Hemorrhagic disorder (HCC).    Patient Active Problem List    Diagnosis Date Noted   • Elevated PSA 08/28/2018   • Intrinsic eczema 08/28/2018   • Peripheral edema 08/28/2018   • Mild concentric left ventricular hypertrophy (LVH) 08/28/2018   • Strain of lumbar region 07/17/2018   • Elevated serum free T4 level 05/22/2018   • Elevated carcinoembryonic antigen (CEA) 02/15/2018   • Chronic seasonal  "allergic rhinitis due to pollen 02/15/2018   • Age-related cataract of both eyes 02/15/2018   • Gastroesophageal reflux disease without esophagitis 02/15/2018   • Inflammatory bowel disease 02/15/2018   • History of Bell's palsy 02/15/2018   • DDD (degenerative disc disease), lumbar 02/15/2018   • Elevated C-reactive protein (CRP) 02/15/2018   • Homocysteinemia (HCC) 02/15/2018   • Family history of colon cancer in mother 02/15/2018   • Family history of pancreatic cancer 02/15/2018   • Other fatigue 03/16/2017   • Central serous retinopathy 10/17/2011       ROS   No fever or chills.  No nausea or vomiting.  No chest pain or palpitations.  No cough or SOB.  No pain with urination or hematuria.  No black or bloody stools.       Objective:     Blood pressure 132/72, pulse 75, temperature 36.4 °C (97.5 °F), resp. rate 20, height 1.753 m (5' 9\"), weight 83.9 kg (185 lb), SpO2 98 %. Body mass index is 27.32 kg/m².   Physical Exam:  Well developed, well nourished.  Alert, oriented in no acute distress.  Eye contact is good, speech goal directed, affect calm  Eyes: conjunctiva non-injected, sclera non-icteric.  Neck Supple.  No adenopathy or masses in the neck or supraclavicular regions. No thyromegaly  Lungs: clear to auscultation bilaterally with good excursion. No wheezes or rhonchi  CV: regular rate and rhythm. No murmur  Abdomen: soft, nontender, no masses or organomegaly.  No rebound or gurading  Ext:  slight 1+ pitting edema, color normal, vascularity normal, temperature normal          Assessment and Plan:   The following treatment plan was discussed    1. Elevated PSA  Refer to urology.  Check total and free PSA  - PSA TOTAL + %FREE; Future  - REFERRAL TO UROLOGY    2. Elevated carcinoembryonic antigen (CEA)  Repeat CEA in 1 month.  - CEA; Future  - PSA TOTAL + %FREE; Future  - REFERRAL TO UROLOGY    3. Intrinsic eczema  Triamcinolone as directed  - triamcinolone acetonide (KENALOG) 0.1 % Cream; Apply to affected " area BID until clear  Dispense: 80 g; Refill: 1    4. Peripheral edema  Check echocardiogram to rule out a direct lying etiology  - ECHOCARDIOGRAM COMP W/O CONT; Future    5. Mild concentric left ventricular hypertrophy (LVH)  Schedule echocardiogram  - ECHOCARDIOGRAM COMP W/O CONT; Future    Any change or worsening of signs or symptoms, patient encouraged to follow-up or report to the emergency room for further evaluation. Patient understands and agrees.    Followup: Return if symptoms worsen or fail to improve.

## 2018-09-04 NOTE — ASSESSMENT & PLAN NOTE
Patient has a history of eczema.  He would like a refill of a cortisone cream to treat this.  He currently has patches on his anterior chest.

## 2018-09-04 NOTE — ASSESSMENT & PLAN NOTE
Patient has had an elevated CEA.  He has had negative colonoscopy.  We have done CTs of the abdomen.  His PSA was  slightly elevated.

## 2018-09-04 NOTE — ASSESSMENT & PLAN NOTE
Patient gets some  slight pitting edema of his lower extremities when he is been up on his feet all day.  This seems to be worsening this summer.

## 2018-09-20 ENCOUNTER — PHYSICAL THERAPY (OUTPATIENT)
Dept: PHYSICAL THERAPY | Facility: MEDICAL CENTER | Age: 68
End: 2018-09-20
Attending: FAMILY MEDICINE
Payer: MEDICARE

## 2018-09-20 ENCOUNTER — HOSPITAL ENCOUNTER (OUTPATIENT)
Dept: CARDIOLOGY | Facility: MEDICAL CENTER | Age: 68
End: 2018-09-20
Attending: FAMILY MEDICINE
Payer: MEDICARE

## 2018-09-20 DIAGNOSIS — M51.36 DDD (DEGENERATIVE DISC DISEASE), LUMBAR: ICD-10-CM

## 2018-09-20 DIAGNOSIS — S39.012A STRAIN OF LUMBAR REGION, INITIAL ENCOUNTER: ICD-10-CM

## 2018-09-20 DIAGNOSIS — I51.7 MILD CONCENTRIC LEFT VENTRICULAR HYPERTROPHY (LVH): ICD-10-CM

## 2018-09-20 DIAGNOSIS — R60.9 PERIPHERAL EDEMA: ICD-10-CM

## 2018-09-20 LAB
LV EJECT FRACT  99904: 60
LV EJECT FRACT MOD 2C 99903: 65.45
LV EJECT FRACT MOD 4C 99902: 58.24
LV EJECT FRACT MOD BP 99901: 61.94

## 2018-09-20 PROCEDURE — 97110 THERAPEUTIC EXERCISES: CPT

## 2018-09-20 PROCEDURE — 93306 TTE W/DOPPLER COMPLETE: CPT

## 2018-09-20 PROCEDURE — 97140 MANUAL THERAPY 1/> REGIONS: CPT

## 2018-09-20 NOTE — OP THERAPY DAILY TREATMENT
"  Outpatient Physical Therapy  DAILY TREATMENT     [facility]  [address]  [place] [address]  Phone:  [phone]  Fax:  [phone]    Date: 09/20/2018    Patient: Jomar Thomas  YOB: 1950  MRN: [id number]     Time Calculation  Start time: 0400  Stop time: 0430 Time Calculation (min): 30 minutes     Chief Complaint: Back Problem    Visit #: 4    SUBJECTIVE:  Pt states that he is feeling a little better and looser since starting therapy and has gone back to his stretches. But continues to have stiffness as this is a chronic issue he has been dealing with for years.    OBJECTIVE:  Current objective measures:           Therapeutic Exercises (CPT 79867):     1. Standing hip abduction, 10 x 1, cues for decreased trunk lean    2. Standing hip extension, 10 x 1, cues for decreased trunk lean    3. Squat, 10 x 1    4. SL opening hip mob (DL off bed), 10\" x 3    5. LTR with T arms, 5\" x 2    Therapeutic Treatments and Modalities:     2. Manual Therapy (CPT 00306), GRIII PA mobs L1-5 and rotational mobs L1-5, 20\" x 2 each, neutral gap (L SL), grade 3, MET, 6\" x 3 x 2, opening mob, grade 3, 20\" x 3     Time-based treatments/modalities:  Manual therapy minutes (CPT 26817): 15 minutes  Therapeutic exercise minutes (CPT 87106): 10 minutes         ASSESSMENT:   Response to treatment: Pt continues to have significant stiffness in his lumbar spine/hips with poor core/glute strength overall. Pt felt \"looser\" following manual therapy and had improved rotation by the end of the second MET set of the neutral gap technique. Pt has tendency to use trunk lean with standing hip exercises, can correct with cueing. Pt will benefit from continuing skilled PT services to improve core strength and improve awareness of HEP to improve low back mobility/circulation.    PLAN/RECOMMENDATIONS:   Plan for treatment: therapy treatment to continue next visit.  Planned " interventions for next visit: continue with current treatment; progress hip/core stab with techniques to reduce abdominal hernia. Repeat neutral/opening gap mobs.

## 2018-09-28 ENCOUNTER — PHYSICAL THERAPY (OUTPATIENT)
Dept: PHYSICAL THERAPY | Facility: MEDICAL CENTER | Age: 68
End: 2018-09-28
Attending: FAMILY MEDICINE
Payer: MEDICARE

## 2018-09-28 DIAGNOSIS — M51.36 DDD (DEGENERATIVE DISC DISEASE), LUMBAR: ICD-10-CM

## 2018-09-28 DIAGNOSIS — S39.012A STRAIN OF LUMBAR REGION, INITIAL ENCOUNTER: ICD-10-CM

## 2018-09-28 PROCEDURE — 97012 MECHANICAL TRACTION THERAPY: CPT

## 2018-09-28 PROCEDURE — 97140 MANUAL THERAPY 1/> REGIONS: CPT | Mod: XU

## 2018-09-28 PROCEDURE — 97110 THERAPEUTIC EXERCISES: CPT

## 2018-09-28 NOTE — OP THERAPY DAILY TREATMENT
"  Outpatient Physical Therapy  DAILY TREATMENT     [facility]  [address]  [place] [address]  Phone:  [phone]  Fax:  [phone]    Date: 09/28/2018    Patient: Jomar Thomas  YOB: 1950  MRN: [id number]     Time Calculation  Start time: 1400  Stop time: 1445 Time Calculation (min): 45 minutes     Chief Complaint: Low Back Pain and Hip Pain    Visit #: 5    SUBJECTIVE:  Pt reports that he did not feel as good following last session, since he did not get the traction. Pt would really like to have traction today. He has been consistent with his exercises at home as well.     OBJECTIVE:  Current objective measures:   Stiff/antalgic gait  Weak core/posterior chain          Therapeutic Exercises (CPT 96276):     1. Standing hip abduction, 10 x 1, cues for decreased trunk lean    2. Standing hip extension, 10 x 1, cues for decreased trunk lean    3. Wall squats, 10 x 1    4. SL opening hip mob (DL off bed), 10\" x 3    5. LTR with T arms, 5\" x 2    6. Nu-step , L3 [id number]    7. Calf stretch on block, x45\"    Therapeutic Treatments and Modalities:     1. Mechanical Traction (CPT 73729), Lumbar: 90/70#, 60/20\" +MHP, x15 minutes    2. Manual Therapy (CPT 91330), GRIII PA mobs L1-5 and rotational mobs L1-5, 20\" x 2 each, neutral gap (L SL), grade 3, MET, 6\" x 3 x 2, opening mob, grade 3, 20\" x 3     Time-based treatments/modalities:  Manual therapy minutes (CPT 55965): 10 minutes  Therapeutic exercise minutes (CPT 31458): 20 minutes         ASSESSMENT:   Response to treatment: Pt continues to have improved symptoms with manual techniques and stretches in HEP; also reports significant relief with mechanical traction. Pt remains limited by stiffness/hypomobile hips/lumbar spine. Pt will continue to benefit from skilled PT services to promote pain-free participation in all ADL/IADL.     PLAN/RECOMMENDATIONS:   Plan for treatment: therapy " treatment to continue next visit.  Planned interventions for next visit: continue with current treatment; progress hip/core stab with techniques to reduce abdominal hernia. Repeat neutral/opening gap mobs.

## 2018-10-05 ENCOUNTER — PHYSICAL THERAPY (OUTPATIENT)
Dept: PHYSICAL THERAPY | Facility: MEDICAL CENTER | Age: 68
End: 2018-10-05
Attending: FAMILY MEDICINE
Payer: MEDICARE

## 2018-10-05 DIAGNOSIS — M51.36 DDD (DEGENERATIVE DISC DISEASE), LUMBAR: ICD-10-CM

## 2018-10-05 DIAGNOSIS — S39.012A STRAIN OF LUMBAR REGION, INITIAL ENCOUNTER: ICD-10-CM

## 2018-10-05 PROCEDURE — 97110 THERAPEUTIC EXERCISES: CPT

## 2018-10-05 PROCEDURE — 97140 MANUAL THERAPY 1/> REGIONS: CPT | Mod: XU

## 2018-10-05 PROCEDURE — 97012 MECHANICAL TRACTION THERAPY: CPT

## 2018-10-05 NOTE — OP THERAPY DAILY TREATMENT
"  Outpatient Physical Therapy  DAILY TREATMENT     [facility]  [address]  [place] [address]  Phone:  [phone]  Fax:  [phone]    Date: 10/05/2018    Patient: Jomar Thomas  YOB: 1950  MRN: [id number]     Time Calculation  Start time: 1530  Stop time: 1615 Time Calculation (min): 45 minutes     Chief Complaint: Low Back Pain    Visit #: 6    SUBJECTIVE:  Pt reports that his tightness and pain seemed to take almost 1 full week prior to returning after last session.     OBJECTIVE:  Current objective measures:   Stiff/antalgic gait  Weak core/posterior chain          Therapeutic Exercises (CPT 44237):     1. Prone LE extension, x10 bilaterally    2. Clamshells in SL, 10 x 1    3. Wall squats, 10 x 1    4. SL opening hip mob (DL off bed), 10\" x 3    5. LTR with T arms, 5\" x 2    6. Nu-step , L3 [id number]    7. Calf stretch on block, x45\"    Therapeutic Treatments and Modalities:     1. Mechanical Traction (CPT 87402), Lumbar: 95/75#, 60/20\" +MHP, x15 minutes    2. Manual Therapy (CPT 72240), GRIII PA mobs L1-5 and rotational mobs L1-5, 20\" x 2 each, neutral gap (L SL), grade 3, MET, 6\" x 3 x 2, opening mob, grade 3, 20\" x 3     Time-based treatments/modalities:  Manual therapy minutes (CPT 64801): 10 minutes  Therapeutic exercise minutes (CPT 81661): 20 minutes       ASSESSMENT:   Response to treatment: Pt continues to demo significant stiffness throughout his lumbar spine and improved symptoms following manual treatment/stretching. Pt will continue to benefit from further emphasis on HEP prescription next visit.     PLAN/RECOMMENDATIONS:   Plan for treatment: therapy treatment to continue next visit.  Planned interventions for next visit: continue with current treatment; progress hip/core stab with techniques to reduce abdominal hernia. Repeat neutral/opening gap mobs.      "

## 2018-10-23 ENCOUNTER — PATIENT MESSAGE (OUTPATIENT)
Dept: MEDICAL GROUP | Facility: MEDICAL CENTER | Age: 68
End: 2018-10-23

## 2018-10-23 DIAGNOSIS — I77.810 DILATED AORTIC ROOT (HCC): ICD-10-CM

## 2018-11-20 ENCOUNTER — TELEPHONE (OUTPATIENT)
Dept: PHYSICAL THERAPY | Facility: MEDICAL CENTER | Age: 68
End: 2018-11-20

## 2018-11-20 NOTE — LETTER
November 20, 2018    No Recipients      Re:  Jomar Thomas          Dear  No Recipients,    It was a pleasure seeing your patient, Jomar Thomas, on 11/20/2018 for his final therapy visit.     Please find enclosed a copy of the patient's discharge summary from outpatient physical therapy services.          On behalf of the staff at [facility], we would like to thank you for your referrals.  We appreciate your confidence in our clinic and will continue to work hard to provide the best outcomes for your patients.    We sincerely enjoy treating your patients and hope that you have been happy with their care.  Thank you again, and please call with any questions, concerns or ways that we can best meet your needs.        Sincerely,          Lydia Carlton, PT, DPT    No Recipients              Outpatient Physical Therapy  DISCHARGE SUMMARY NOTE      [facility]  [address]  [place] [address]  Phone:  [phone]  Fax:  [phone]    Date of Visit: 11/20/2018    Patient: Jomar Thomas  YOB: 1950  MRN: [id number]     Referring Provider:  Pratibha Corral M.D.   Referring Diagnosis DDD (degenerative disc disease), lumbar [M51.36];Strain of lumbar region, initial encounter [S39.012A]     Physical Therapy Occurrence Codes    Date of onset of impairment:  8/16/17   Date physical therapy care plan established or reviewed:  8/16/18   Date physical therapy treatment started:  8/16/18          Functional Limitation G-Codes and Severity Modifiers      Goal:   Goal (): CJ   Discharge:   (): CK       Your patient is being discharged from Physical Therapy with the following comments:   · Patient has failed to schedule or reschedule follow-up visits    Comments:  Pt was last seen for skilled PT on 10/5/18; his symptoms always improved following therapy sessions, but he reported the pain returning  after ~1 week following each visit. Pt continues to have tightness in his lower back and poor core/hip strength.       Recommendations:  Please refer this patient back if any further needs arise.     Lydia Carlton PT, DPT    Date: 11/20/2018

## 2018-11-20 NOTE — OP THERAPY DISCHARGE SUMMARY
Outpatient Physical Therapy  DISCHARGE SUMMARY NOTE      [facility] Out[facility] Physical Therapy  [address]  [place] [address]  Phone:  [phone]  Fax:  [phone]    Date of Visit: 11/20/2018    Patient: Jomar Thomas  YOB: 1950  MRN: [id number]     Referring Provider:  Pratibha Corral M.D.   Referring Diagnosis DDD (degenerative disc disease), lumbar [M51.36];Strain of lumbar region, initial encounter [S39.012A]     Physical Therapy Occurrence Codes    Date of onset of impairment:  8/16/17   Date physical therapy care plan established or reviewed:  8/16/18   Date physical therapy treatment started:  8/16/18          Functional Limitation G-Codes and Severity Modifiers      Goal:   Goal (): CJ   Discharge:   (): CK       Your patient is being discharged from Physical Therapy with the following comments:   · Patient has failed to schedule or reschedule follow-up visits    Comments:  Pt was last seen for skilled PT on 10/5/18; his symptoms always improved following therapy sessions, but he reported the pain returning after ~1 week following each visit. Pt continues to have tightness in his lower back and poor core/hip strength.       Recommendations:  Please refer this patient back if any further needs arise.     Lydia Carlton, PT, DPT    Date: 11/20/2018

## 2018-11-26 ENCOUNTER — OFFICE VISIT (OUTPATIENT)
Dept: ENDOCRINOLOGY | Facility: MEDICAL CENTER | Age: 68
End: 2018-11-26
Payer: MEDICARE

## 2018-11-26 VITALS
SYSTOLIC BLOOD PRESSURE: 120 MMHG | HEIGHT: 69 IN | HEART RATE: 76 BPM | WEIGHT: 172 LBS | OXYGEN SATURATION: 100 % | BODY MASS INDEX: 25.48 KG/M2 | DIASTOLIC BLOOD PRESSURE: 70 MMHG

## 2018-11-26 DIAGNOSIS — E55.9 VITAMIN D DEFICIENCY: ICD-10-CM

## 2018-11-26 DIAGNOSIS — R53.83 FATIGUE, UNSPECIFIED TYPE: ICD-10-CM

## 2018-11-26 DIAGNOSIS — M62.81 MUSCLE WEAKNESS (GENERALIZED): ICD-10-CM

## 2018-11-26 DIAGNOSIS — R79.89 PATHOLOGIC HIGH SERUM PROLACTIN: ICD-10-CM

## 2018-11-26 DIAGNOSIS — E06.3 HASHIMOTO'S THYROIDITIS: ICD-10-CM

## 2018-11-26 DIAGNOSIS — R79.89 ELEVATED SERUM FREE T4 LEVEL: ICD-10-CM

## 2018-11-26 PROCEDURE — 99204 OFFICE O/P NEW MOD 45 MIN: CPT | Performed by: INTERNAL MEDICINE

## 2018-11-26 RX ORDER — MULTIVIT-MIN/IRON/FOLIC ACID/K 18-600-40
2 CAPSULE ORAL
COMMUNITY

## 2018-11-26 NOTE — PROGRESS NOTES
New Patient Consult Note  Primary care physician: Pratibha Corral M.D.    Reason for consult: Elevated Serum free T 4 level.    HPI:  Jomar Thomas is a 68 y.o. old patient who comes in today for evaluation of elevated serum free T 4 level and fatigue.  He states his symptoms of being tired, slow thinking, fatiguing easier, decrease in mood started around June.    ROS:  Constitutional: No weight loss  Cardiac: No palpitations or racing heart  Resp: No shortness of breath  Neuro: No numbness or tinging in feet  Endo: No heat or cold intolerance, no polyuria or polydipsia  All other systems were reviewed and were negative.    Past Medical History:  Patient Active Problem List    Diagnosis Date Noted   • Dilated aortic root (HCC) 10/23/2018   • Elevated PSA 08/28/2018   • Intrinsic eczema 08/28/2018   • Peripheral edema 08/28/2018   • Mild concentric left ventricular hypertrophy (LVH) 08/28/2018   • Strain of lumbar region 07/17/2018   • Elevated serum free T4 level 05/22/2018   • Elevated carcinoembryonic antigen (CEA) 02/15/2018   • Chronic seasonal allergic rhinitis due to pollen 02/15/2018   • Age-related cataract of both eyes 02/15/2018   • Gastroesophageal reflux disease without esophagitis 02/15/2018   • Inflammatory bowel disease 02/15/2018   • History of Bell's palsy 02/15/2018   • DDD (degenerative disc disease), lumbar 02/15/2018   • Elevated C-reactive protein (CRP) 02/15/2018   • Homocysteinemia (HCC) 02/15/2018   • Family history of colon cancer in mother 02/15/2018   • Family history of pancreatic cancer 02/15/2018   • Other fatigue 03/16/2017   • Central serous retinopathy 10/17/2011       Past Surgical History:  Past Surgical History:   Procedure Laterality Date   • GASTROSCOPY  9/22/2017    Procedure: GASTROSCOPY-WITH POSSIBLE 45 Romansh SAVORY DILATION OVER GUIDEWIRE;  Surgeon: Finn Bernard M.D.;  Location: SURGERY [facility];  Service: Gastroenterology   • EGD W/ENDOSCOPIC ULTRASOUND   9/22/2017    Procedure: EGD W/ENDOSCOPIC ULTRASOUND- UPPER, RADIAL;  Surgeon: Finn Bernard M.D.;  Location: SURGERY [facility];  Service: Gastroenterology   • GASTROSCOPY WITH BIOPSY  9/22/2017    Procedure: GASTROSCOPY WITH BIOPSY- SIDE VIEWING SCOPE BIOPSIES OF AMPULLA;  Surgeon: Finn Bernard M.D.;  Location: [facility];  Service: Gastroenterology   • TOE ARTHROPLASTY  6/26/2013    Performed by Tirso Colón D.P.M. at SURGERY [facility] (RIGHT great toe)   • UMBILICAL HERNIA REPAIR  2006   • OTHER Right 1969    Tendon repair RIGHT index finger   • CERVICAL LAMINECTOMY POSTERIOR      C5 - 1984   • TONSILLECTOMY AND ADENOIDECTOMY         Allergies:  Seasonal    Social History:  Social History     Social History   • Marital status:      Spouse name: N/A   • Number of children: N/A   • Years of education: N/A     Occupational History   • Not on file.     Social History Main Topics   • Smoking status: Former Smoker     Packs/day: 1.00     Years: 20.00     Types: Cigarettes     Quit date: 2/15/2001   • Smokeless tobacco: Never Used   • Alcohol use 0.6 oz/week     1 Glasses of wine per week      Comment: weekly   • Drug use: No   • Sexual activity: Yes     Other Topics Concern   • Not on file     Social History Narrative   • No narrative on file       Family History:  Family History   Problem Relation Age of Onset   • Cancer Mother         breast and adenocarcinoma of colon   • Stroke Father    • Cancer Brother         pancreatic cancer       Medications:    Current Outpatient Prescriptions:   •  Cholecalciferol (VITAMIN D) 2000 units Cap, Take  by mouth., Disp: , Rfl:   •  multivitamin (THERAGRAN) Tab, Take 1 Tab by mouth every day., Disp: , Rfl:   •  triamcinolone acetonide (KENALOG) 0.1 % Cream, Apply to affected area BID until clear, Disp: 80 g, Rfl: 1  •  Fluticasone Propionate (FLONASE NA), Spray  in nose., Disp: , Rfl:   •  Triamcinolone Acetonide (NASACORT ALLERGY  "24HR NA), Spray  in nose., Disp: , Rfl:   •  DiphenhydrAMINE HCl (BENADRYL ALLERGY PO), Take  by mouth., Disp: , Rfl:   •  raNITidine (ZANTAC) 150 MG Tab, Take 150 mg by mouth 2 times a day., Disp: , Rfl:     Labs: Reviewed    Physical Examination:  Vital signs: /70   Pulse 76   Ht 1.753 m (5' 9\")   Wt 78 kg (172 lb)   SpO2 100%   BMI 25.40 kg/m²  Body mass index is 25.4 kg/m². Patient's body mass index is 25.4 kg/m². Exercise and nutrition counseling were performed at this visit.  General: No apparent distress, cooperative  Eyes: No scleral icterus or discharge  ENMT: Normal on external inspection of nose, lips, normal thyroid exam  Neck: No abnormal masses on inspection  Resp: Normal effort, clear to auscultation bilaterally   CVS: Regular rate and rhythm, S1 S2 normal, no murmur   Extremities: No edema  Abdomen: abdominal obesity present  Neuro: Alert and oriented  Skin: No rash  Psych: Normal mood and affect, intact memory and able to make informed decisions    Assessment and Plan:    1. Hypothyroidism:  Does have hashimoto's; repeat thyroid panel and consider levothyroxine hormone replacement if needed.    2. Fatigue, unspecified type  Rule out Vitamin D deficiency as the contributory factor for fatigue.   Rule out Vitamin B12 deficiency as the contributory factor for fatigue.  Rule out hypogonadism as the contributory factor for fatigue.     Return in about 6 weeks (around 1/7/2019).    Thank you for allowing me to participate in the care of this patient.    Jerome Zamarripa M.D.  11/26/18    CC:   Pratibha Corral M.D.    This note was created using voice recognition software (Dragon). The accuracy of the dictation is limited by the abilities of the software. I have reviewed the note prior to signing, however some errors in grammar and context are still possible. If you have any questions related to this note please do not hesitate to contact our office.       "

## 2018-11-30 ENCOUNTER — HOSPITAL ENCOUNTER (OUTPATIENT)
Dept: LAB | Facility: MEDICAL CENTER | Age: 68
End: 2018-11-30
Attending: INTERNAL MEDICINE
Payer: MEDICARE

## 2018-11-30 DIAGNOSIS — R79.89 PATHOLOGIC HIGH SERUM PROLACTIN: ICD-10-CM

## 2018-11-30 DIAGNOSIS — E55.9 VITAMIN D DEFICIENCY: ICD-10-CM

## 2018-11-30 DIAGNOSIS — E06.3 HASHIMOTO'S THYROIDITIS: ICD-10-CM

## 2018-11-30 DIAGNOSIS — R53.83 FATIGUE, UNSPECIFIED TYPE: ICD-10-CM

## 2018-11-30 DIAGNOSIS — M62.81 MUSCLE WEAKNESS (GENERALIZED): ICD-10-CM

## 2018-11-30 LAB
25(OH)D3 SERPL-MCNC: 41 NG/ML (ref 30–100)
FSH SERPL-ACNC: 13.8 MIU/ML (ref 1.5–12.4)
LH SERPL-ACNC: 4 IU/L (ref 1.7–8.6)
PROLACTIN SERPL-MCNC: 6.48 NG/ML (ref 2.1–17.7)
T3 SERPL-MCNC: 74.8 NG/DL (ref 60–181)
T3FREE SERPL-MCNC: 3.26 PG/ML (ref 2.4–4.2)
T4 FREE SERPL-MCNC: 1.21 NG/DL (ref 0.53–1.43)
TESTOST SERPL-MCNC: 305 NG/DL (ref 175–781)
THYROPEROXIDASE AB SERPL-ACNC: 10.2 IU/ML (ref 0–9)
TSH SERPL DL<=0.005 MIU/L-ACNC: 1.72 UIU/ML (ref 0.38–5.33)
VIT B12 SERPL-MCNC: 1493 PG/ML (ref 211–911)

## 2018-11-30 PROCEDURE — 36415 COLL VENOUS BLD VENIPUNCTURE: CPT

## 2018-11-30 PROCEDURE — 83001 ASSAY OF GONADOTROPIN (FSH): CPT

## 2018-11-30 PROCEDURE — 82607 VITAMIN B-12: CPT

## 2018-11-30 PROCEDURE — 84270 ASSAY OF SEX HORMONE GLOBUL: CPT

## 2018-11-30 PROCEDURE — 82306 VITAMIN D 25 HYDROXY: CPT

## 2018-11-30 PROCEDURE — 84480 ASSAY TRIIODOTHYRONINE (T3): CPT

## 2018-11-30 PROCEDURE — 84403 ASSAY OF TOTAL TESTOSTERONE: CPT

## 2018-11-30 PROCEDURE — 84443 ASSAY THYROID STIM HORMONE: CPT

## 2018-11-30 PROCEDURE — 84481 FREE ASSAY (FT-3): CPT

## 2018-11-30 PROCEDURE — 84146 ASSAY OF PROLACTIN: CPT

## 2018-11-30 PROCEDURE — 84439 ASSAY OF FREE THYROXINE: CPT

## 2018-11-30 PROCEDURE — 83002 ASSAY OF GONADOTROPIN (LH): CPT

## 2018-11-30 PROCEDURE — 86376 MICROSOMAL ANTIBODY EACH: CPT

## 2018-12-02 LAB — SHBG SERPL-SCNC: 76 NMOL/L (ref 11–80)

## 2018-12-07 ENCOUNTER — HOSPITAL ENCOUNTER (OUTPATIENT)
Dept: LAB | Facility: MEDICAL CENTER | Age: 68
End: 2018-12-07
Attending: INTERNAL MEDICINE
Payer: MEDICARE

## 2018-12-07 DIAGNOSIS — R53.83 FATIGUE, UNSPECIFIED TYPE: ICD-10-CM

## 2018-12-07 LAB — TESTOST SERPL-MCNC: 339 NG/DL (ref 175–781)

## 2018-12-07 PROCEDURE — 36415 COLL VENOUS BLD VENIPUNCTURE: CPT

## 2018-12-07 PROCEDURE — 84403 ASSAY OF TOTAL TESTOSTERONE: CPT

## 2018-12-14 ENCOUNTER — OFFICE VISIT (OUTPATIENT)
Dept: URGENT CARE | Facility: MEDICAL CENTER | Age: 68
End: 2018-12-14
Payer: MEDICARE

## 2018-12-14 VITALS
WEIGHT: 175.2 LBS | SYSTOLIC BLOOD PRESSURE: 140 MMHG | TEMPERATURE: 98.9 F | HEIGHT: 69 IN | DIASTOLIC BLOOD PRESSURE: 72 MMHG | HEART RATE: 80 BPM | BODY MASS INDEX: 25.95 KG/M2 | OXYGEN SATURATION: 96 %

## 2018-12-14 DIAGNOSIS — L03.114 CELLULITIS OF LEFT UPPER EXTREMITY: ICD-10-CM

## 2018-12-14 PROCEDURE — 99213 OFFICE O/P EST LOW 20 MIN: CPT | Performed by: NURSE PRACTITIONER

## 2018-12-14 RX ORDER — SULFAMETHOXAZOLE AND TRIMETHOPRIM 800; 160 MG/1; MG/1
TABLET ORAL
COMMUNITY
Start: 2018-12-02 | End: 2019-01-07

## 2018-12-14 RX ORDER — CYANOCOBALAMIN 1000 UG/ML
1000 INJECTION, SOLUTION INTRAMUSCULAR; SUBCUTANEOUS
COMMUNITY
Start: 2018-12-03

## 2018-12-14 ASSESSMENT — ENCOUNTER SYMPTOMS
CHILLS: 0
FEVER: 0

## 2018-12-15 NOTE — PROGRESS NOTES
"Subjective:      Jomar Thomas is a 68 y.o. male who presents with Rash (Left arm/ sore or scab/ swollen/ itchy)    Past Medical History:   Diagnosis Date   • Arthritis     DJD- Lumbar   • Cataract     Bilateral (has not been surgically corrected)   • Central serous retinopathy     right eye   • Central serous retinopathy 10/17/2011   • Cold 09/2017    Sinus infection- treated with antibiotics   • Heart burn    • Hemorrhagic disorder (HCC)     \"nosebleeds\"     Social History     Social History   • Marital status:      Spouse name: N/A   • Number of children: N/A   • Years of education: N/A     Occupational History   • Not on file.     Social History Main Topics   • Smoking status: Former Smoker     Packs/day: 1.00     Years: 20.00     Types: Cigarettes     Quit date: 2/15/2001   • Smokeless tobacco: Never Used   • Alcohol use 0.6 oz/week     1 Glasses of wine per week      Comment: weekly   • Drug use: No   • Sexual activity: Yes     Other Topics Concern   • Not on file     Social History Narrative   • No narrative on file     Family History   Problem Relation Age of Onset   • Cancer Mother         breast and adenocarcinoma of colon   • Stroke Father    • Cancer Brother         pancreatic cancer       Allergies: Seasonal    Patient is a 68-year-old male who presents today with complaint of circumferential redness and tenderness to the left upper extremity around the forearm.  He does not know of any specific injury but there does appear to be a small puncture.  He denies fever, aches, or chills.  States the area of redness was smaller this morning and has expanded this afternoon.        Rash   This is a new problem. The current episode started today. The problem is unchanged. It is unknown if there was an exposure to a precipitant. Pertinent negatives include no fever. Past treatments include nothing. The treatment provided no relief.       Review of Systems   Constitutional: Negative for chills and " "fever.   Skin: Positive for rash. Negative for itching.   All other systems reviewed and are negative.         Objective:     /72   Pulse 80   Temp 37.2 °C (98.9 °F) (Temporal)   Ht 1.753 m (5' 9\")   Wt 79.5 kg (175 lb 3.2 oz)   SpO2 96%   BMI 25.87 kg/m²      Physical Exam   Constitutional: He is oriented to person, place, and time. He appears well-developed and well-nourished.   Cardiovascular: Normal rate and regular rhythm.    Musculoskeletal:        Arms:  Circumferential redness to the left forearm.  No streaking at this time.  No axillary point tenderness or palpable lymph nodes.   Neurological: He is alert and oriented to person, place, and time.   Skin: Skin is warm and dry. Capillary refill takes less than 2 seconds.   Psychiatric: He has a normal mood and affect. His behavior is normal.   Vitals reviewed.              Assessment/Plan:     1. Cellulitis of left upper extremity    - cefTRIAXone (ROCEPHIN) 1 g, lidocaine (XYLOCAINE) 1 % 3.6 mL for IM use; 1 g by Intramuscular route Once.  -Bactrim DS; patient states he has a full prescription of this at home and will start taking it.  -Warm compresses  -Return for increasing redness, pain, streaking, or axillary tenderness or fever greater than 100.5.      "

## 2018-12-20 ENCOUNTER — TELEPHONE (OUTPATIENT)
Dept: MEDICAL GROUP | Facility: LAB | Age: 68
End: 2018-12-20

## 2018-12-20 ENCOUNTER — APPOINTMENT (RX ONLY)
Dept: URBAN - METROPOLITAN AREA CLINIC 35 | Facility: CLINIC | Age: 68
Setting detail: DERMATOLOGY
End: 2018-12-20

## 2018-12-20 DIAGNOSIS — T07XXXA ABRASION OR FRICTION BURN OF OTHER, MULTIPLE, AND UNSPECIFIED SITES, WITHOUT MENTION OF INFECTION: ICD-10-CM

## 2018-12-20 DIAGNOSIS — L72.0 EPIDERMAL CYST: ICD-10-CM

## 2018-12-20 DIAGNOSIS — L81.4 OTHER MELANIN HYPERPIGMENTATION: ICD-10-CM

## 2018-12-20 DIAGNOSIS — L82.1 OTHER SEBORRHEIC KERATOSIS: ICD-10-CM

## 2018-12-20 DIAGNOSIS — Z71.89 OTHER SPECIFIED COUNSELING: ICD-10-CM

## 2018-12-20 DIAGNOSIS — D22 MELANOCYTIC NEVI: ICD-10-CM

## 2018-12-20 PROBLEM — J30.1 ALLERGIC RHINITIS DUE TO POLLEN: Status: ACTIVE | Noted: 2018-12-20

## 2018-12-20 PROBLEM — D22.61 MELANOCYTIC NEVI OF RIGHT UPPER LIMB, INCLUDING SHOULDER: Status: ACTIVE | Noted: 2018-12-20

## 2018-12-20 PROBLEM — S50.812A ABRASION OF LEFT FOREARM, INITIAL ENCOUNTER: Status: ACTIVE | Noted: 2018-12-20

## 2018-12-20 PROCEDURE — ? OBSERVATION AND MEASURE

## 2018-12-20 PROCEDURE — ? COUNSELING

## 2018-12-20 PROCEDURE — ? OBSERVATION

## 2018-12-20 PROCEDURE — 99213 OFFICE O/P EST LOW 20 MIN: CPT

## 2018-12-20 PROCEDURE — ? ADDITIONAL NOTES

## 2018-12-20 ASSESSMENT — LOCATION SIMPLE DESCRIPTION DERM
LOCATION SIMPLE: LEFT FOREARM
LOCATION SIMPLE: UPPER BACK
LOCATION SIMPLE: RIGHT SHOULDER

## 2018-12-20 ASSESSMENT — LOCATION ZONE DERM
LOCATION ZONE: TRUNK
LOCATION ZONE: ARM

## 2018-12-20 ASSESSMENT — LOCATION DETAILED DESCRIPTION DERM
LOCATION DETAILED: RIGHT POSTERIOR SHOULDER
LOCATION DETAILED: LEFT PROXIMAL RADIAL DORSAL FOREARM
LOCATION DETAILED: LEFT PROXIMAL DORSAL FOREARM
LOCATION DETAILED: SUPERIOR THORACIC SPINE

## 2018-12-20 NOTE — PROCEDURE: OBSERVATION
Detail Level: Detailed
Size Of Lesion: 1.2 cm
Size Of Lesion In Cm (Optional): 0
Detail Level: Simple

## 2018-12-20 NOTE — PROCEDURE: ADDITIONAL NOTES
Detail Level: Detailed
Additional Notes: Abrasion from patient’s puppy, appears to be healing well patient currently putting antibiotic ointment on wound and keeping it covered.

## 2018-12-21 NOTE — TELEPHONE ENCOUNTER
1. Caller Name:Nona                                         Call Back Number: [phone]      Patient approves a detailed voicemail message: N\A    Pt has APPT on Monday with Dr Dela Cruz, Vascular. Please specify why pt is going to be seen, they think pt should be seen at Cardiologist. They would like to know ASAP so they can call pt and cancel if need be, pt has been waiting since October.    please call Nona back today

## 2018-12-21 NOTE — TELEPHONE ENCOUNTER
Dilated aortic root and ascending aorta, measuring 3.8 cm and 4.0 cm, respectively.  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  Pratibha Corral M.D.

## 2018-12-28 ENCOUNTER — HOSPITAL ENCOUNTER (OUTPATIENT)
Dept: LAB | Facility: MEDICAL CENTER | Age: 68
End: 2018-12-28
Attending: UROLOGY
Payer: MEDICARE

## 2018-12-28 LAB — PSA SERPL-MCNC: 2.49 NG/ML (ref 0–4)

## 2018-12-28 PROCEDURE — 36415 COLL VENOUS BLD VENIPUNCTURE: CPT | Mod: GA

## 2018-12-28 PROCEDURE — 84153 ASSAY OF PSA TOTAL: CPT | Mod: GA

## 2019-01-07 ENCOUNTER — OFFICE VISIT (OUTPATIENT)
Dept: ENDOCRINOLOGY | Facility: MEDICAL CENTER | Age: 69
End: 2019-01-07
Payer: MEDICARE

## 2019-01-07 VITALS
DIASTOLIC BLOOD PRESSURE: 60 MMHG | BODY MASS INDEX: 25.77 KG/M2 | WEIGHT: 174 LBS | SYSTOLIC BLOOD PRESSURE: 102 MMHG | OXYGEN SATURATION: 100 % | HEIGHT: 69 IN | HEART RATE: 69 BPM

## 2019-01-07 DIAGNOSIS — E53.8 VITAMIN B12 DEFICIENCY: ICD-10-CM

## 2019-01-07 DIAGNOSIS — M62.81 MUSCLE WEAKNESS (GENERALIZED): ICD-10-CM

## 2019-01-07 DIAGNOSIS — E55.9 VITAMIN D DEFICIENCY: ICD-10-CM

## 2019-01-07 DIAGNOSIS — E06.3 HASHIMOTO'S THYROIDITIS: ICD-10-CM

## 2019-01-07 DIAGNOSIS — Z79.899 HIGH RISK MEDICATION USE: ICD-10-CM

## 2019-01-07 DIAGNOSIS — E29.1 HYPOGONADISM IN MALE: ICD-10-CM

## 2019-01-07 PROCEDURE — 99214 OFFICE O/P EST MOD 30 MIN: CPT | Mod: 25 | Performed by: INTERNAL MEDICINE

## 2019-01-07 RX ORDER — SYRINGE W-NEEDLE,DISPOSAB,3 ML 25GX5/8"
SYRINGE, EMPTY DISPOSABLE MISCELLANEOUS
Qty: 12 EACH | Refills: 1 | Status: SHIPPED | OUTPATIENT
Start: 2019-01-07 | End: 2019-02-19 | Stop reason: SDUPTHER

## 2019-01-07 RX ORDER — TESTOSTERONE CYPIONATE 200 MG/ML
200 INJECTION, SOLUTION INTRAMUSCULAR
Qty: 10 ML | Refills: 0 | Status: SHIPPED | OUTPATIENT
Start: 2019-01-07 | End: 2019-01-21 | Stop reason: SDUPTHER

## 2019-01-07 RX ORDER — TESTOSTERONE CYPIONATE 200 MG/ML
250 INJECTION, SOLUTION INTRAMUSCULAR ONCE
Status: COMPLETED | OUTPATIENT
Start: 2019-01-07 | End: 2019-01-07

## 2019-01-07 RX ADMIN — TESTOSTERONE CYPIONATE 250 MG: 200 INJECTION, SOLUTION INTRAMUSCULAR at 16:04

## 2019-01-07 NOTE — PROGRESS NOTES
"Endocrinology Clinic Progress Note  PCP: Pratibha Corral M.D.    HPI:  Jomar Thomas is a 68 y.o. old patient who comes in today for review of endocrine problems.    Hashimoto's thyroiditis:  Not currently taking any thyroid medications.       Vitamin D Deficiency:  Currently taking Vitamin D 2000 units.     Muscle weakness:  Continues to be fatigued and having mild lower extremity weakness.    Vitamin B 12 Deficiency: Vitamin B 12 1000 mcg IM every 14 days      ROS:  Constitutional: No unintentional weight loss  Endo: Denies excessive thirst or frequent urination  All other systems were reviewed and were negative.    Past Medical History:  Patient Active Problem List    Diagnosis Date Noted   • Dilated aortic root (HCC) 10/23/2018   • Elevated PSA 08/28/2018   • Intrinsic eczema 08/28/2018   • Peripheral edema 08/28/2018   • Mild concentric left ventricular hypertrophy (LVH) 08/28/2018   • Strain of lumbar region 07/17/2018   • Elevated serum free T4 level 05/22/2018   • Elevated carcinoembryonic antigen (CEA) 02/15/2018   • Chronic seasonal allergic rhinitis due to pollen 02/15/2018   • Age-related cataract of both eyes 02/15/2018   • Gastroesophageal reflux disease without esophagitis 02/15/2018   • Inflammatory bowel disease 02/15/2018   • History of Bell's palsy 02/15/2018   • DDD (degenerative disc disease), lumbar 02/15/2018   • Elevated C-reactive protein (CRP) 02/15/2018   • Homocysteinemia (HCC) 02/15/2018   • Family history of colon cancer in mother 02/15/2018   • Family history of pancreatic cancer 02/15/2018   • Other fatigue 03/16/2017   • Central serous retinopathy 10/17/2011       Medications:    Current Outpatient Prescriptions:   •  testosterone cypionate (DEPO-TESTOSTERONE) 200 MG/ML Solution injection, 1 mL by Intramuscular route every 14 days for 140 days., Disp: 10 mL, Rfl: 0  •  Syringe/Needle, Disp, (SYRINGE 3CC/18TV8-0/2\") 22G X 1-1/2\" 3 ML Misc, For use with Intra-muscular " "testosterone  injection every 2 weeks., Disp: 12 Each, Rfl: 1  •  cyanocobalamin (VITAMIN B-12) 1000 MCG/ML Solution, , Disp: , Rfl:   •  Cholecalciferol (VITAMIN D) 2000 units Cap, Take  by mouth., Disp: , Rfl:   •  raNITidine (ZANTAC) 150 MG Tab, Take 150 mg by mouth 2 times a day., Disp: , Rfl:   •  multivitamin (THERAGRAN) Tab, Take 1 Tab by mouth every day., Disp: , Rfl:   •  triamcinolone acetonide (KENALOG) 0.1 % Cream, Apply to affected area BID until clear (Patient not taking: Reported on 12/14/2018), Disp: 80 g, Rfl: 1  •  DiphenhydrAMINE HCl (BENADRYL ALLERGY PO), Take  by mouth., Disp: , Rfl:     Labs: Reviewed    Physical Examination:  Vital signs: /60   Pulse 69   Ht 1.753 m (5' 9\")   Wt 78.9 kg (174 lb)   SpO2 100%   BMI 25.70 kg/m²  Body mass index is 25.7 kg/m². Patient's body mass index is 25.7 kg/m². Exercise and nutrition counseling were performed at this visit.  General: No apparent distress, cooperative  Eyes: No scleral icterus, no discharge, normal eyelids  Neck: No abnormal masses on inspection, normal thyroid exam  Resp: Normal effort, clear to auscultation bilaterally  CVS: Regular rate and rhythm, S1 S2 normal, no murmur  Extremities: No lower extremity edema  Abdomen: abdominal obesity present  Musculoskeletal: Normal digits and nails  Skin: No rash on visible skin  Psych: Alert and oriented, normal mood and affect, intact memory and able to make informed decisions.    Assessment and Plan:    1. Hashimoto's thyroiditis  There is no evidence of hypothyroidism.  Although the Hashimoto's antibodies are present.    2. Vitamin D deficiency  Recommend to increase vitamin D supplementation to 10,000 units daily with food    3. Muscle weakness  Most likely secondary to vitamin D deficiency and hypogonadism.  Will benefit from vitamin D supplementation and testosterone replacement    4. Vitamin B12 deficiency  Continue vitamin B12 supplementation    5. Hypogonadism: He has 2 calculated " free testosterone levels that are less than 6.5 ng/dL.  He has 1 level of 3.75 ng/dL and the other one at around 3.25 ng/dL.  This are really very low.  Discussed the side effects and benefits of testosterone supplementation.  First dose of testosterone injection was given in the office today.    Return in about 3 months (around 4/7/2019).    Thank you for allowing me to participate in the care of this patient.    Jerome Zamarripa M.D.    CC:   Pratibha Corral M.D.    This note was created using voice recognition software (Dragon). The accuracy of the dictation is limited by the abilities of the software. I have reviewed the note prior to signing, however some errors in grammar and context are still possible. If you have any questions related to this note please do not hesitate to contact our office.

## 2019-01-21 ENCOUNTER — NON-PROVIDER VISIT (OUTPATIENT)
Dept: ENDOCRINOLOGY | Facility: MEDICAL CENTER | Age: 69
End: 2019-01-21
Payer: MEDICARE

## 2019-01-21 DIAGNOSIS — E29.1 HYPOGONADISM IN MALE: ICD-10-CM

## 2019-01-21 DIAGNOSIS — E29.1 HYPOGONADISM MALE: ICD-10-CM

## 2019-01-21 RX ORDER — TESTOSTERONE CYPIONATE 200 MG/ML
200 INJECTION, SOLUTION INTRAMUSCULAR
Qty: 10 ML | Refills: 0 | Status: SHIPPED | OUTPATIENT
Start: 2019-01-21 | End: 2019-02-19 | Stop reason: SDUPTHER

## 2019-01-21 RX ORDER — TESTOSTERONE CYPIONATE 200 MG/ML
200 INJECTION, SOLUTION INTRAMUSCULAR ONCE
Status: COMPLETED | OUTPATIENT
Start: 2019-01-21 | End: 2019-01-22

## 2019-01-22 PROCEDURE — 96372 THER/PROPH/DIAG INJ SC/IM: CPT | Performed by: INTERNAL MEDICINE

## 2019-01-22 RX ADMIN — TESTOSTERONE CYPIONATE 200 MG: 200 INJECTION, SOLUTION INTRAMUSCULAR at 06:51

## 2019-02-04 ENCOUNTER — NON-PROVIDER VISIT (OUTPATIENT)
Dept: ENDOCRINOLOGY | Facility: MEDICAL CENTER | Age: 69
End: 2019-02-04
Payer: MEDICARE

## 2019-02-04 DIAGNOSIS — E29.1 HYPOGONADISM IN MALE: ICD-10-CM

## 2019-02-04 PROCEDURE — 96372 THER/PROPH/DIAG INJ SC/IM: CPT | Performed by: INTERNAL MEDICINE

## 2019-02-04 RX ORDER — TESTOSTERONE CYPIONATE 200 MG/ML
200 INJECTION, SOLUTION INTRAMUSCULAR
Status: DISCONTINUED | OUTPATIENT
Start: 2019-02-04 | End: 2019-04-16

## 2019-02-04 RX ADMIN — TESTOSTERONE CYPIONATE 200 MG: 200 INJECTION, SOLUTION INTRAMUSCULAR at 15:47

## 2019-02-19 ENCOUNTER — NON-PROVIDER VISIT (OUTPATIENT)
Dept: ENDOCRINOLOGY | Facility: MEDICAL CENTER | Age: 69
End: 2019-02-19
Payer: MEDICARE

## 2019-02-19 DIAGNOSIS — E29.1 HYPOGONADISM IN MALE: ICD-10-CM

## 2019-02-19 PROCEDURE — 96372 THER/PROPH/DIAG INJ SC/IM: CPT | Performed by: INTERNAL MEDICINE

## 2019-02-19 RX ORDER — TESTOSTERONE CYPIONATE 200 MG/ML
200 INJECTION, SOLUTION INTRAMUSCULAR
Qty: 10 ML | Refills: 0 | Status: SHIPPED | OUTPATIENT
Start: 2019-02-19 | End: 2019-04-01 | Stop reason: SDUPTHER

## 2019-02-19 RX ORDER — SYRINGE W-NEEDLE,DISPOSAB,3 ML 25GX5/8"
SYRINGE, EMPTY DISPOSABLE MISCELLANEOUS
Qty: 12 EACH | Refills: 3 | Status: SHIPPED | OUTPATIENT
Start: 2019-02-19

## 2019-02-19 RX ADMIN — TESTOSTERONE CYPIONATE 200 MG: 200 INJECTION, SOLUTION INTRAMUSCULAR at 14:51

## 2019-03-05 ENCOUNTER — NON-PROVIDER VISIT (OUTPATIENT)
Dept: ENDOCRINOLOGY | Facility: MEDICAL CENTER | Age: 69
End: 2019-03-05
Payer: MEDICARE

## 2019-03-05 DIAGNOSIS — E29.1 HYPOGONADISM IN MALE: ICD-10-CM

## 2019-03-05 PROCEDURE — 96372 THER/PROPH/DIAG INJ SC/IM: CPT | Performed by: INTERNAL MEDICINE

## 2019-03-05 RX ADMIN — TESTOSTERONE CYPIONATE 200 MG: 200 INJECTION, SOLUTION INTRAMUSCULAR at 15:23

## 2019-03-19 ENCOUNTER — NON-PROVIDER VISIT (OUTPATIENT)
Dept: ENDOCRINOLOGY | Facility: MEDICAL CENTER | Age: 69
End: 2019-03-19
Payer: MEDICARE

## 2019-03-19 DIAGNOSIS — E29.1 HYPOGONADISM MALE: ICD-10-CM

## 2019-03-19 PROCEDURE — 96372 THER/PROPH/DIAG INJ SC/IM: CPT | Performed by: INTERNAL MEDICINE

## 2019-03-19 RX ADMIN — TESTOSTERONE CYPIONATE 200 MG: 200 INJECTION, SOLUTION INTRAMUSCULAR at 14:56

## 2019-03-29 ENCOUNTER — HOSPITAL ENCOUNTER (OUTPATIENT)
Dept: LAB | Facility: MEDICAL CENTER | Age: 69
End: 2019-03-29
Attending: INTERNAL MEDICINE
Payer: MEDICARE

## 2019-03-29 DIAGNOSIS — E55.9 VITAMIN D DEFICIENCY: ICD-10-CM

## 2019-03-29 DIAGNOSIS — E29.1 HYPOGONADISM IN MALE: ICD-10-CM

## 2019-03-29 DIAGNOSIS — Z79.899 HIGH RISK MEDICATION USE: ICD-10-CM

## 2019-03-29 DIAGNOSIS — E53.8 VITAMIN B12 DEFICIENCY: ICD-10-CM

## 2019-03-29 LAB
25(OH)D3 SERPL-MCNC: 48 NG/ML (ref 30–100)
ERYTHROCYTE [DISTWIDTH] IN BLOOD BY AUTOMATED COUNT: 55 FL (ref 35.9–50)
HCT VFR BLD AUTO: 50.1 % (ref 42–52)
HGB BLD-MCNC: 17 G/DL (ref 14–18)
MCH RBC QN AUTO: 34.8 PG (ref 27–33)
MCHC RBC AUTO-ENTMCNC: 33.9 G/DL (ref 33.7–35.3)
MCV RBC AUTO: 102.5 FL (ref 81.4–97.8)
PLATELET # BLD AUTO: 141 K/UL (ref 164–446)
PMV BLD AUTO: 9.6 FL (ref 9–12.9)
RBC # BLD AUTO: 4.89 M/UL (ref 4.7–6.1)
TESTOST SERPL-MCNC: 438 NG/DL (ref 175–781)
VIT B12 SERPL-MCNC: 1463 PG/ML (ref 211–911)
WBC # BLD AUTO: 5.3 K/UL (ref 4.8–10.8)

## 2019-03-29 PROCEDURE — 85027 COMPLETE CBC AUTOMATED: CPT

## 2019-03-29 PROCEDURE — 84270 ASSAY OF SEX HORMONE GLOBUL: CPT

## 2019-03-29 PROCEDURE — 82607 VITAMIN B-12: CPT

## 2019-03-29 PROCEDURE — 84403 ASSAY OF TOTAL TESTOSTERONE: CPT

## 2019-03-29 PROCEDURE — 36415 COLL VENOUS BLD VENIPUNCTURE: CPT

## 2019-03-29 PROCEDURE — 82306 VITAMIN D 25 HYDROXY: CPT

## 2019-03-31 LAB — SHBG SERPL-SCNC: 102 NMOL/L (ref 11–80)

## 2019-03-31 NOTE — PROGRESS NOTES
"Endocrinology Clinic Progress Note  PCP: Pratibha Corral M.D.    HPI:  Jomar Thomas is a 68 y.o. old patient who comes in today for review of endocrine problems.    Hypogonadism:  Currently taking Testosterone Cypionate 200 mg IM every 14 days.    Hashimoto's thyroiditis:  Not currently taking any thyroid medications.        Vitamin D Deficiency:  Currently taking Vitamin D 4000 units daily.     Vitamin B 12 Deficiency: Currently taking Vitamin B 12 1000 mcg IM every 14 days.  ROS:  Constitutional: No unintentional weight loss  Endo: Denies excessive thirst or frequent urination  All other systems were reviewed and were negative.    Past Medical History:  Patient Active Problem List    Diagnosis Date Noted   • Dilated aortic root (HCC) 10/23/2018   • Elevated PSA 08/28/2018   • Intrinsic eczema 08/28/2018   • Peripheral edema 08/28/2018   • Mild concentric left ventricular hypertrophy (LVH) 08/28/2018   • Strain of lumbar region 07/17/2018   • Elevated serum free T4 level 05/22/2018   • Elevated carcinoembryonic antigen (CEA) 02/15/2018   • Chronic seasonal allergic rhinitis due to pollen 02/15/2018   • Age-related cataract of both eyes 02/15/2018   • Gastroesophageal reflux disease without esophagitis 02/15/2018   • Inflammatory bowel disease 02/15/2018   • History of Bell's palsy 02/15/2018   • DDD (degenerative disc disease), lumbar 02/15/2018   • Elevated C-reactive protein (CRP) 02/15/2018   • Homocysteinemia (HCC) 02/15/2018   • Family history of colon cancer in mother 02/15/2018   • Family history of pancreatic cancer 02/15/2018   • Other fatigue 03/16/2017   • Central serous retinopathy 10/17/2011       Medications:    Current Outpatient Prescriptions:   •  testosterone cypionate (DEPO-TESTOSTERONE) 200 MG/ML Solution injection, 1.25 mL by Intramuscular route every 14 days for 140 days., Disp: 10 mL, Rfl: 0  •  Syringe/Needle, Disp, (SYRINGE 3CC/68GJ7-6/2\") 22G X 1-1/2\" 3 ML Misc, For use with " "Intra-muscular testosterone  injection every 2 weeks., Disp: 12 Each, Rfl: 3  •  cyanocobalamin (VITAMIN B-12) 1000 MCG/ML Solution, , Disp: , Rfl:   •  Cholecalciferol (VITAMIN D) 2000 units Cap, Take 4,000 Units by mouth., Disp: , Rfl:   •  raNITidine (ZANTAC) 150 MG Tab, Take 150 mg by mouth 2 times a day., Disp: , Rfl:   •  multivitamin (THERAGRAN) Tab, Take 1 Tab by mouth every day., Disp: , Rfl:   •  triamcinolone acetonide (KENALOG) 0.1 % Cream, Apply to affected area BID until clear (Patient not taking: Reported on 12/14/2018), Disp: 80 g, Rfl: 1  •  DiphenhydrAMINE HCl (BENADRYL ALLERGY PO), Take  by mouth., Disp: , Rfl:     Current Facility-Administered Medications:   •  testosterone cypionate (DEPO-TESTOSTERONE) injection 200 mg, 200 mg, Intramuscular, Q14 DAYS, Jerome Zamarripa, 200 mg at 03/19/19 1456    Labs: Reviewed    Physical Examination:  Vital signs: /70   Pulse 72   Ht 1.753 m (5' 9\")   Wt 78 kg (172 lb)   SpO2 98%   BMI 25.40 kg/m²  Body mass index is 25.4 kg/m². Patient's body mass index is 25.4 kg/m². Exercise and nutrition counseling were performed at this visit.  General: No apparent distress, cooperative  Eyes: No scleral icterus, no discharge, normal eyelids  Neck: No abnormal masses on inspection, normal thyroid exam  Resp: Normal effort, clear to auscultation bilaterally  CVS: Regular rate and rhythm, S1 S2 normal, no murmur  Extremities: No lower extremity edema  Abdomen: abdominal obesity present  Musculoskeletal: Normal digits and nails  Skin: No rash on visible skin  Psych: Alert and oriented, normal mood and affect, intact memory and able to make informed decisions.    Assessment and Plan:    1. Hypogonadism male  Increase testosterone cypionate to 250 mg IM every 14 days. Repeat levels again one week after 5 th injection .     2. Hashimoto's thyroiditis  Watch closely.     3. Vitamin D deficiency  Cont D.     4. Vitamin B12 deficiency   cont B 12.     Return in about 3 " months (around 7/1/2019).    Thank you for allowing me to participate in the care of this patient.    Jerome Zamarripa  This note was scribed by Mar Mancia RN CDE  CC:   Pratibha Corral M.D.    This note was created using voice recognition software (Dragon). The accuracy of the dictation is limited by the abilities of the software. I have reviewed the note prior to signing, however some errors in grammar and context are still possible. If you have any questions related to this note please do not hesitate to contact our office.

## 2019-04-01 ENCOUNTER — OFFICE VISIT (OUTPATIENT)
Dept: ENDOCRINOLOGY | Facility: MEDICAL CENTER | Age: 69
End: 2019-04-01
Payer: MEDICARE

## 2019-04-01 VITALS
WEIGHT: 172 LBS | HEIGHT: 69 IN | OXYGEN SATURATION: 98 % | DIASTOLIC BLOOD PRESSURE: 70 MMHG | SYSTOLIC BLOOD PRESSURE: 124 MMHG | HEART RATE: 72 BPM | BODY MASS INDEX: 25.48 KG/M2

## 2019-04-01 DIAGNOSIS — E53.8 VITAMIN B12 DEFICIENCY: ICD-10-CM

## 2019-04-01 DIAGNOSIS — E55.9 VITAMIN D DEFICIENCY: ICD-10-CM

## 2019-04-01 DIAGNOSIS — E29.1 HYPOGONADISM MALE: ICD-10-CM

## 2019-04-01 DIAGNOSIS — E06.3 HASHIMOTO'S THYROIDITIS: ICD-10-CM

## 2019-04-01 DIAGNOSIS — E29.1 HYPOGONADISM IN MALE: ICD-10-CM

## 2019-04-01 DIAGNOSIS — E53.8 FOLATE DEFICIENCY: ICD-10-CM

## 2019-04-01 PROCEDURE — 99214 OFFICE O/P EST MOD 30 MIN: CPT | Performed by: INTERNAL MEDICINE

## 2019-04-01 RX ORDER — TESTOSTERONE CYPIONATE 200 MG/ML
250 INJECTION, SOLUTION INTRAMUSCULAR
Qty: 10 ML | Refills: 0 | Status: SHIPPED | OUTPATIENT
Start: 2019-04-01 | End: 2019-04-16 | Stop reason: SDUPTHER

## 2019-04-15 ENCOUNTER — NON-PROVIDER VISIT (OUTPATIENT)
Dept: ENDOCRINOLOGY | Facility: MEDICAL CENTER | Age: 69
End: 2019-04-15
Payer: MEDICARE

## 2019-04-15 DIAGNOSIS — E29.1 HYPOGONADISM MALE: ICD-10-CM

## 2019-04-15 PROCEDURE — 96372 THER/PROPH/DIAG INJ SC/IM: CPT | Performed by: INTERNAL MEDICINE

## 2019-04-15 RX ADMIN — TESTOSTERONE CYPIONATE 200 MG: 200 INJECTION, SOLUTION INTRAMUSCULAR at 15:59

## 2019-04-15 NOTE — PROGRESS NOTES
Assessment and Plan:     1. Hypogonadism male  Increase testosterone cypionate to 250 mg IM every 14 days. Repeat levels again one week after 5 th injection .      Per Dr. Zamarripa last office note on 4/1/19 his amount changed to 250mg. Patient brought in his own testosterone (200mg) so we supplemented with one of our own stock.     lot [id number] ex 04/2021    Please change new order to reflect changes made on last visit.

## 2019-04-16 DIAGNOSIS — E29.1 HYPOGONADISM IN MALE: ICD-10-CM

## 2019-04-16 RX ORDER — TESTOSTERONE CYPIONATE 200 MG/ML
250 INJECTION, SOLUTION INTRAMUSCULAR
Qty: 10 ML | Refills: 0 | Status: SHIPPED | OUTPATIENT
Start: 2019-04-16 | End: 2019-05-13 | Stop reason: SDUPTHER

## 2019-04-16 RX ORDER — TESTOSTERONE CYPIONATE 200 MG/ML
250 INJECTION, SOLUTION INTRAMUSCULAR
Status: DISCONTINUED | OUTPATIENT
Start: 2019-04-29 | End: 2019-10-07

## 2019-04-29 ENCOUNTER — NON-PROVIDER VISIT (OUTPATIENT)
Dept: ENDOCRINOLOGY | Facility: MEDICAL CENTER | Age: 69
End: 2019-04-29
Payer: MEDICARE

## 2019-04-29 DIAGNOSIS — E29.1 HYPOGONADISM MALE: ICD-10-CM

## 2019-04-29 PROCEDURE — 96372 THER/PROPH/DIAG INJ SC/IM: CPT | Performed by: INTERNAL MEDICINE

## 2019-04-29 RX ADMIN — TESTOSTERONE CYPIONATE 250 MG: 200 INJECTION, SOLUTION INTRAMUSCULAR at 15:00

## 2019-05-13 ENCOUNTER — NON-PROVIDER VISIT (OUTPATIENT)
Dept: ENDOCRINOLOGY | Facility: MEDICAL CENTER | Age: 69
End: 2019-05-13
Payer: MEDICARE

## 2019-05-13 DIAGNOSIS — E29.1 HYPOGONADISM IN MALE: ICD-10-CM

## 2019-05-13 DIAGNOSIS — E29.1 HYPOGONADISM MALE: ICD-10-CM

## 2019-05-13 PROCEDURE — 96372 THER/PROPH/DIAG INJ SC/IM: CPT | Performed by: INTERNAL MEDICINE

## 2019-05-13 RX ORDER — TESTOSTERONE CYPIONATE 200 MG/ML
250 INJECTION, SOLUTION INTRAMUSCULAR
Qty: 10 ML | Refills: 0 | Status: SHIPPED | OUTPATIENT
Start: 2019-05-13 | End: 2019-07-02 | Stop reason: SDUPTHER

## 2019-05-13 RX ADMIN — TESTOSTERONE CYPIONATE 250 MG: 200 INJECTION, SOLUTION INTRAMUSCULAR at 15:18

## 2019-05-28 ENCOUNTER — NON-PROVIDER VISIT (OUTPATIENT)
Dept: ENDOCRINOLOGY | Facility: MEDICAL CENTER | Age: 69
End: 2019-05-28
Payer: MEDICARE

## 2019-05-28 DIAGNOSIS — E29.1 HYPOGONADISM MALE: ICD-10-CM

## 2019-05-28 PROCEDURE — 96372 THER/PROPH/DIAG INJ SC/IM: CPT | Performed by: INTERNAL MEDICINE

## 2019-05-28 RX ADMIN — TESTOSTERONE CYPIONATE 250 MG: 200 INJECTION, SOLUTION INTRAMUSCULAR at 15:52

## 2019-05-30 ENCOUNTER — APPOINTMENT (OUTPATIENT)
Dept: MEDICAL GROUP | Facility: LAB | Age: 69
End: 2019-05-30
Payer: MEDICARE

## 2019-06-10 ENCOUNTER — NON-PROVIDER VISIT (OUTPATIENT)
Dept: ENDOCRINOLOGY | Facility: MEDICAL CENTER | Age: 69
End: 2019-06-10
Payer: MEDICARE

## 2019-06-10 DIAGNOSIS — E29.1 HYPOGONADISM IN MALE: ICD-10-CM

## 2019-06-10 PROCEDURE — 96372 THER/PROPH/DIAG INJ SC/IM: CPT | Performed by: INTERNAL MEDICINE

## 2019-06-10 RX ADMIN — TESTOSTERONE CYPIONATE 250 MG: 200 INJECTION, SOLUTION INTRAMUSCULAR at 16:06

## 2019-06-25 ENCOUNTER — NON-PROVIDER VISIT (OUTPATIENT)
Dept: ENDOCRINOLOGY | Facility: MEDICAL CENTER | Age: 69
End: 2019-06-25
Payer: MEDICARE

## 2019-06-25 DIAGNOSIS — E29.1 HYPOGONADISM MALE: ICD-10-CM

## 2019-06-25 PROCEDURE — 96372 THER/PROPH/DIAG INJ SC/IM: CPT | Performed by: INTERNAL MEDICINE

## 2019-06-25 RX ADMIN — TESTOSTERONE CYPIONATE 250 MG: 200 INJECTION, SOLUTION INTRAMUSCULAR at 15:48

## 2019-06-29 ENCOUNTER — HOSPITAL ENCOUNTER (OUTPATIENT)
Dept: LAB | Facility: MEDICAL CENTER | Age: 69
End: 2019-06-29
Attending: INTERNAL MEDICINE
Payer: MEDICARE

## 2019-06-29 DIAGNOSIS — E55.9 VITAMIN D DEFICIENCY: ICD-10-CM

## 2019-06-29 DIAGNOSIS — E29.1 HYPOGONADISM IN MALE: ICD-10-CM

## 2019-06-29 DIAGNOSIS — E53.8 FOLATE DEFICIENCY: ICD-10-CM

## 2019-06-29 DIAGNOSIS — E06.3 HASHIMOTO'S THYROIDITIS: ICD-10-CM

## 2019-06-29 DIAGNOSIS — E53.8 VITAMIN B12 DEFICIENCY: ICD-10-CM

## 2019-06-29 DIAGNOSIS — E29.1 HYPOGONADISM MALE: ICD-10-CM

## 2019-06-29 LAB
ANION GAP SERPL CALC-SCNC: 9 MMOL/L (ref 0–11.9)
BUN SERPL-MCNC: 12 MG/DL (ref 8–22)
CALCIUM SERPL-MCNC: 9.2 MG/DL (ref 8.5–10.5)
CHLORIDE SERPL-SCNC: 101 MMOL/L (ref 96–112)
CO2 SERPL-SCNC: 26 MMOL/L (ref 20–33)
CREAT SERPL-MCNC: 1.04 MG/DL (ref 0.5–1.4)
ERYTHROCYTE [DISTWIDTH] IN BLOOD BY AUTOMATED COUNT: 61.4 FL (ref 35.9–50)
FASTING STATUS PATIENT QL REPORTED: NORMAL
FOLATE SERPL-MCNC: >23.8 NG/ML
GLUCOSE SERPL-MCNC: 81 MG/DL (ref 65–99)
HCT VFR BLD AUTO: 48.1 % (ref 42–52)
HGB BLD-MCNC: 16.2 G/DL (ref 14–18)
MCH RBC QN AUTO: 34.5 PG (ref 27–33)
MCHC RBC AUTO-ENTMCNC: 33.7 G/DL (ref 33.7–35.3)
MCV RBC AUTO: 102.3 FL (ref 81.4–97.8)
PLATELET # BLD AUTO: 143 K/UL (ref 164–446)
PMV BLD AUTO: 10.1 FL (ref 9–12.9)
POTASSIUM SERPL-SCNC: 4.2 MMOL/L (ref 3.6–5.5)
RBC # BLD AUTO: 4.7 M/UL (ref 4.7–6.1)
SODIUM SERPL-SCNC: 136 MMOL/L (ref 135–145)
TESTOST SERPL-MCNC: 1765 NG/DL (ref 175–781)
WBC # BLD AUTO: 6.8 K/UL (ref 4.8–10.8)

## 2019-06-29 PROCEDURE — 84270 ASSAY OF SEX HORMONE GLOBUL: CPT

## 2019-06-29 PROCEDURE — 36415 COLL VENOUS BLD VENIPUNCTURE: CPT

## 2019-06-29 PROCEDURE — 84403 ASSAY OF TOTAL TESTOSTERONE: CPT

## 2019-06-29 PROCEDURE — 85027 COMPLETE CBC AUTOMATED: CPT

## 2019-06-29 PROCEDURE — 80048 BASIC METABOLIC PNL TOTAL CA: CPT

## 2019-06-29 PROCEDURE — 82746 ASSAY OF FOLIC ACID SERUM: CPT

## 2019-07-01 LAB — SHBG SERPL-SCNC: 59 NMOL/L (ref 11–80)

## 2019-07-01 NOTE — PROGRESS NOTES
Endocrinology Clinic Progress Note  PCP: Pratibha Corral M.D.    HPI:  Jomar Thomas is a 69 y.o. old patient who comes in today for review of endocrine problems.    Hypogonadism:  Currently taking Testosterone Cypionate 250 mg IM every 14 days.  His lab results on 6/29/19 shows a Hemoglobin of 16.2 and Hematocrit of 48.1.  He had his testosterone level done only 4 days after his injection and it came back high with a testosterone of 1765 and SHBG of 59.  Tired during the day and will have a sleep apnea test.     Hashimoto's thyroiditis:  Not currently taking any thyroid medications.        Vitamin D Deficiency:  Currently taking Vitamin D 4000 units daily.     Vitamin B 12 Deficiency: Currently taking Vitamin B 12 1000 mcg IM every monthy.    ROS:  Constitutional: No unintentional weight loss  Endo: Denies excessive thirst or frequent urination  All other systems were reviewed and were negative.    Past Medical History:  Patient Active Problem List    Diagnosis Date Noted   • Dilated aortic root (HCC) 10/23/2018   • Elevated PSA 08/28/2018   • Intrinsic eczema 08/28/2018   • Peripheral edema 08/28/2018   • Mild concentric left ventricular hypertrophy (LVH) 08/28/2018   • Strain of lumbar region 07/17/2018   • Elevated serum free T4 level 05/22/2018   • Elevated carcinoembryonic antigen (CEA) 02/15/2018   • Chronic seasonal allergic rhinitis due to pollen 02/15/2018   • Age-related cataract of both eyes 02/15/2018   • Gastroesophageal reflux disease without esophagitis 02/15/2018   • Inflammatory bowel disease 02/15/2018   • History of Bell's palsy 02/15/2018   • DDD (degenerative disc disease), lumbar 02/15/2018   • Elevated C-reactive protein (CRP) 02/15/2018   • Homocysteinemia (HCC) 02/15/2018   • Family history of colon cancer in mother 02/15/2018   • Family history of pancreatic cancer 02/15/2018   • Other fatigue 03/16/2017   • Central serous retinopathy 10/17/2011       Medications:    Current  "Outpatient Prescriptions:   •  testosterone cypionate (DEPO-TESTOSTERONE) 200 MG/ML Solution injection, 1 mL by Intramuscular route every 14 days for 140 days., Disp: 10 mL, Rfl: 0  •  cyanocobalamin (VITAMIN B-12) 1000 MCG/ML Solution, 1,000 mcg by Intramuscular route every 30 days., Disp: , Rfl:   •  Cholecalciferol (VITAMIN D) 2000 units Cap, Take 4,000 Units by mouth., Disp: , Rfl:   •  raNITidine (ZANTAC) 150 MG Tab, Take 150 mg by mouth 2 times a day., Disp: , Rfl:   •  multivitamin (THERAGRAN) Tab, Take 1 Tab by mouth every day., Disp: , Rfl:   •  Syringe/Needle, Disp, (SYRINGE 3CC/42YT1-2/2\") 22G X 1-1/2\" 3 ML Misc, For use with Intra-muscular testosterone  injection every 2 weeks., Disp: 12 Each, Rfl: 3  •  DiphenhydrAMINE HCl (BENADRYL ALLERGY PO), Take  by mouth., Disp: , Rfl:     Current Facility-Administered Medications:   •  testosterone cypionate (DEPO-TESTOSTERONE) injection 250 mg, 250 mg, Intramuscular, Q14 DAYS, Jerome Zamarripa, 250 mg at 06/25/19 1548    Labs: Reviewed    Physical Examination:  Vital signs: /70   Pulse 79   Ht 1.753 m (5' 9\")   Wt 82.6 kg (182 lb)   SpO2 97%   BMI 26.88 kg/m²  Body mass index is 26.88 kg/m². Patient's body mass index is 26.88 kg/m². Exercise and nutrition counseling were performed at this visit.  General: No apparent distress, cooperative  Eyes: No scleral icterus, no discharge, normal eyelids  Neck: No abnormal masses on inspection, normal thyroid exam  Resp: Normal effort, clear to auscultation bilaterally  CVS: Regular rate and rhythm, S1 S2 normal, no murmur  Extremities: No lower extremity edema, left upper extremity cast present.   Abdomen: abdominal obesity present  Musculoskeletal: Normal digits and nails  Skin: No rash on visible skin  Psych: Alert and oriented, normal mood and affect, intact memory and able to make informed decisions.    Assessment and Plan:    1. Hypogonadism male  Feels similar on 200 mg or 250 mg IM every 2 weeks.     2. " High risk medication use  Monitor hb and ht closely.     3. Vitamin D deficiency  Cont vit D.     5. Vitamin B12 deficiency  Cont B 12.     Return in about 3 months (around 10/2/2019).    Thank you for allowing me to participate in the care of this patient.    Jerome Zamarripa  This note was scribed by Mar Mancia RN CDE  CC:   Pratibha Corral M.D.    This note was created using voice recognition software (Dragon). The accuracy of the dictation is limited by the abilities of the software. I have reviewed the note prior to signing, however some errors in grammar and context are still possible. If you have any questions related to this note please do not hesitate to contact our office.

## 2019-07-02 ENCOUNTER — OFFICE VISIT (OUTPATIENT)
Dept: ENDOCRINOLOGY | Facility: MEDICAL CENTER | Age: 69
End: 2019-07-02
Payer: MEDICARE

## 2019-07-02 VITALS
SYSTOLIC BLOOD PRESSURE: 130 MMHG | HEIGHT: 69 IN | BODY MASS INDEX: 26.96 KG/M2 | OXYGEN SATURATION: 97 % | DIASTOLIC BLOOD PRESSURE: 70 MMHG | HEART RATE: 79 BPM | WEIGHT: 182 LBS

## 2019-07-02 DIAGNOSIS — E55.9 VITAMIN D DEFICIENCY: ICD-10-CM

## 2019-07-02 DIAGNOSIS — E29.1 HYPOGONADISM MALE: ICD-10-CM

## 2019-07-02 DIAGNOSIS — E29.1 HYPOGONADISM IN MALE: ICD-10-CM

## 2019-07-02 DIAGNOSIS — E53.8 VITAMIN B12 DEFICIENCY: ICD-10-CM

## 2019-07-02 DIAGNOSIS — Z79.899 HIGH RISK MEDICATION USE: ICD-10-CM

## 2019-07-02 DIAGNOSIS — E06.3 HASHIMOTO'S THYROIDITIS: ICD-10-CM

## 2019-07-02 PROCEDURE — 99214 OFFICE O/P EST MOD 30 MIN: CPT | Performed by: INTERNAL MEDICINE

## 2019-07-02 RX ORDER — TESTOSTERONE CYPIONATE 200 MG/ML
200 INJECTION, SOLUTION INTRAMUSCULAR
Qty: 10 ML | Refills: 0 | Status: SHIPPED | OUTPATIENT
Start: 2019-07-02 | End: 2019-10-07 | Stop reason: SDUPTHER

## 2019-07-09 ENCOUNTER — NON-PROVIDER VISIT (OUTPATIENT)
Dept: ENDOCRINOLOGY | Facility: MEDICAL CENTER | Age: 69
End: 2019-07-09
Payer: MEDICARE

## 2019-07-09 DIAGNOSIS — E29.1 HYPOGONADISM MALE: ICD-10-CM

## 2019-07-09 PROCEDURE — 96372 THER/PROPH/DIAG INJ SC/IM: CPT | Performed by: INTERNAL MEDICINE

## 2019-07-09 RX ADMIN — TESTOSTERONE CYPIONATE 250 MG: 200 INJECTION, SOLUTION INTRAMUSCULAR at 13:39

## 2019-07-23 ENCOUNTER — NON-PROVIDER VISIT (OUTPATIENT)
Dept: ENDOCRINOLOGY | Facility: MEDICAL CENTER | Age: 69
End: 2019-07-23
Payer: MEDICARE

## 2019-07-23 DIAGNOSIS — E29.1 HYPOGONADISM MALE: ICD-10-CM

## 2019-07-23 PROCEDURE — 96372 THER/PROPH/DIAG INJ SC/IM: CPT | Performed by: INTERNAL MEDICINE

## 2019-07-23 RX ADMIN — TESTOSTERONE CYPIONATE 250 MG: 200 INJECTION, SOLUTION INTRAMUSCULAR at 14:21

## 2019-08-06 ENCOUNTER — NON-PROVIDER VISIT (OUTPATIENT)
Dept: ENDOCRINOLOGY | Facility: MEDICAL CENTER | Age: 69
End: 2019-08-06
Payer: MEDICARE

## 2019-08-06 DIAGNOSIS — E29.1 HYPOGONADISM MALE: ICD-10-CM

## 2019-08-06 PROCEDURE — 96372 THER/PROPH/DIAG INJ SC/IM: CPT | Performed by: INTERNAL MEDICINE

## 2019-08-06 RX ADMIN — TESTOSTERONE CYPIONATE 200 MG: 200 INJECTION, SOLUTION INTRAMUSCULAR at 15:59

## 2019-08-20 ENCOUNTER — NON-PROVIDER VISIT (OUTPATIENT)
Dept: ENDOCRINOLOGY | Facility: MEDICAL CENTER | Age: 69
End: 2019-08-20
Payer: MEDICARE

## 2019-08-20 DIAGNOSIS — E29.1 HYPOGONADISM MALE: ICD-10-CM

## 2019-08-20 PROCEDURE — 96372 THER/PROPH/DIAG INJ SC/IM: CPT | Performed by: INTERNAL MEDICINE

## 2019-08-20 RX ADMIN — TESTOSTERONE CYPIONATE 250 MG: 200 INJECTION, SOLUTION INTRAMUSCULAR at 14:39

## 2019-09-03 ENCOUNTER — NON-PROVIDER VISIT (OUTPATIENT)
Dept: ENDOCRINOLOGY | Facility: MEDICAL CENTER | Age: 69
End: 2019-09-03
Payer: MEDICARE

## 2019-09-03 DIAGNOSIS — E29.1 HYPOGONADISM MALE: ICD-10-CM

## 2019-09-03 PROCEDURE — 96372 THER/PROPH/DIAG INJ SC/IM: CPT | Performed by: PHYSICIAN ASSISTANT

## 2019-09-03 RX ADMIN — TESTOSTERONE CYPIONATE 250 MG: 200 INJECTION, SOLUTION INTRAMUSCULAR at 15:57

## 2019-09-11 ENCOUNTER — HOSPITAL ENCOUNTER (OUTPATIENT)
Dept: LAB | Facility: MEDICAL CENTER | Age: 69
End: 2019-09-11
Attending: INTERNAL MEDICINE
Payer: MEDICARE

## 2019-09-11 DIAGNOSIS — E55.9 VITAMIN D DEFICIENCY: ICD-10-CM

## 2019-09-11 DIAGNOSIS — Z79.899 HIGH RISK MEDICATION USE: ICD-10-CM

## 2019-09-11 DIAGNOSIS — E53.8 VITAMIN B12 DEFICIENCY: ICD-10-CM

## 2019-09-11 DIAGNOSIS — E29.1 HYPOGONADISM MALE: ICD-10-CM

## 2019-09-11 LAB
ERYTHROCYTE [DISTWIDTH] IN BLOOD BY AUTOMATED COUNT: 61.1 FL (ref 35.9–50)
HCT VFR BLD AUTO: 48.4 % (ref 42–52)
HGB BLD-MCNC: 15.9 G/DL (ref 14–18)
MCH RBC QN AUTO: 33.1 PG (ref 27–33)
MCHC RBC AUTO-ENTMCNC: 32.9 G/DL (ref 33.7–35.3)
MCV RBC AUTO: 100.6 FL (ref 81.4–97.8)
PLATELET # BLD AUTO: 104 K/UL (ref 164–446)
PMV BLD AUTO: 9.6 FL (ref 9–12.9)
RBC # BLD AUTO: 4.81 M/UL (ref 4.7–6.1)
WBC # BLD AUTO: 7 K/UL (ref 4.8–10.8)

## 2019-09-11 PROCEDURE — 85027 COMPLETE CBC AUTOMATED: CPT

## 2019-09-11 PROCEDURE — 82607 VITAMIN B-12: CPT

## 2019-09-11 PROCEDURE — 36415 COLL VENOUS BLD VENIPUNCTURE: CPT

## 2019-09-11 PROCEDURE — 84403 ASSAY OF TOTAL TESTOSTERONE: CPT

## 2019-09-11 PROCEDURE — 84270 ASSAY OF SEX HORMONE GLOBUL: CPT

## 2019-09-11 PROCEDURE — 82306 VITAMIN D 25 HYDROXY: CPT

## 2019-09-12 LAB
25(OH)D3 SERPL-MCNC: 44 NG/ML (ref 30–100)
SHBG SERPL-SCNC: 69 NMOL/L (ref 11–80)
TESTOST SERPL-MCNC: 847 NG/DL (ref 175–781)
VIT B12 SERPL-MCNC: 1257 PG/ML (ref 211–911)

## 2019-09-17 ENCOUNTER — NON-PROVIDER VISIT (OUTPATIENT)
Dept: ENDOCRINOLOGY | Facility: MEDICAL CENTER | Age: 69
End: 2019-09-17
Payer: MEDICARE

## 2019-09-17 DIAGNOSIS — E29.1 HYPOGONADISM MALE: ICD-10-CM

## 2019-09-17 PROCEDURE — 96372 THER/PROPH/DIAG INJ SC/IM: CPT | Performed by: INTERNAL MEDICINE

## 2019-09-17 RX ADMIN — TESTOSTERONE CYPIONATE 250 MG: 200 INJECTION, SOLUTION INTRAMUSCULAR at 15:53

## 2019-09-17 NOTE — NON-PROVIDER
Jomar Thomas is a 69 y.o. male here for a non-provider visit for Testosterone injection.    Reason for injection: E29.1 hypogonadism  Order in MAR?: Yes  Patient supplied?:No  Minimum interval has been met for this injection (per MAR order): Yes    Order and dose verified by: AT  Patient tolerated injection and no adverse effects were observed or reported: Yes    # of Administrations remaining in MAR: 2

## 2019-10-01 ENCOUNTER — NON-PROVIDER VISIT (OUTPATIENT)
Dept: ENDOCRINOLOGY | Facility: MEDICAL CENTER | Age: 69
End: 2019-10-01
Payer: MEDICARE

## 2019-10-01 DIAGNOSIS — E29.1 HYPOGONADISM MALE: ICD-10-CM

## 2019-10-01 PROCEDURE — 96372 THER/PROPH/DIAG INJ SC/IM: CPT | Performed by: INTERNAL MEDICINE

## 2019-10-01 RX ADMIN — TESTOSTERONE CYPIONATE 250 MG: 200 INJECTION, SOLUTION INTRAMUSCULAR at 14:52

## 2019-10-01 NOTE — NON-PROVIDER
Jomar Thomas is a 69 y.o. male here for a non-provider visit for Testosterone injection.    Reason for injection: low tesosterone  Order in MAR?: Yes  Patient supplied?:No  Minimum interval has been met for this injection (per MAR order): Yes    Order and dose verified by: MELQUIADES  Patient tolerated injection and no adverse effects were observed or reported: Yes    # of Administrations remaining in MAR: 0

## 2019-10-06 NOTE — PROGRESS NOTES
Endocrinology Clinic Progress Note  PCP: Pratibha Corral M.D.    HPI:  Jomar Thomas is a 69 y.o. old patient who comes in today for review of endocrine problems.    Hypogonadism:  Currently taking Testosterone Cypionate 200 mg IM every 14 days.  Calculated Free Testosterone is 11.8.   Ref. Range 9/11/2019 08:07   Sex Hormone Bind Globulin Latest Ref Range: 11 - 80 nmol/L 69   Testosterone,Total Latest Ref Range: 175 - 781 ng/dL 847 (H)       Vitamin D Deficiency:  Currently taking Vitamin D 4000 units daily.     Ref. Range 9/11/2019 08:07   25-Hydroxy   Vitamin D 25 Latest Ref Range: 30 - 100 ng/mL 44     Vitamin B 12 Deficiency: Currently taking Vitamin B 12 1000 mcg IM every monthy.    Vitamin B12 -True Cobalamin Latest Ref Range: 211 - 911 pg/mL 1257 (H)         ROS:  Constitutional: No unintentional weight loss  Endo: Denies excessive thirst or frequent urination  All other systems were reviewed and were negative.    Past Medical History:  Patient Active Problem List    Diagnosis Date Noted   • Dilated aortic root (HCC) 10/23/2018   • Elevated PSA 08/28/2018   • Intrinsic eczema 08/28/2018   • Peripheral edema 08/28/2018   • Mild concentric left ventricular hypertrophy (LVH) 08/28/2018   • Strain of lumbar region 07/17/2018   • Elevated serum free T4 level 05/22/2018   • Elevated carcinoembryonic antigen (CEA) 02/15/2018   • Chronic seasonal allergic rhinitis due to pollen 02/15/2018   • Age-related cataract of both eyes 02/15/2018   • Gastroesophageal reflux disease without esophagitis 02/15/2018   • Inflammatory bowel disease 02/15/2018   • History of Bell's palsy 02/15/2018   • DDD (degenerative disc disease), lumbar 02/15/2018   • Elevated C-reactive protein (CRP) 02/15/2018   • Homocysteinemia (HCC) 02/15/2018   • Family history of colon cancer in mother 02/15/2018   • Family history of pancreatic cancer 02/15/2018   • Other fatigue 03/16/2017   • Central serous retinopathy 10/17/2011  "      Medications:    Current Outpatient Medications:   •  lisinopril (PRINIVIL, ZESTRIL) 30 MG tablet, Take 30 mg by mouth every day., Disp: , Rfl:   •  amitriptyline (ELAVIL) 10 MG Tab, , Disp: , Rfl:   •  propranolol (INDERAL) 10 MG Tab, , Disp: , Rfl:   •  testosterone cypionate (DEPO-TESTOSTERONE) 200 MG/ML Solution injection, 1.13 mL by Intramuscular route every 14 days for 140 days., Disp: 10 mL, Rfl: 0  •  Syringe/Needle, Disp, (SYRINGE 3CC/58WO1-7/2\") 22G X 1-1/2\" 3 ML Misc, For use with Intra-muscular testosterone  injection every 2 weeks., Disp: 12 Each, Rfl: 3  •  cyanocobalamin (VITAMIN B-12) 1000 MCG/ML Solution, 1,000 mcg by Intramuscular route every 30 days., Disp: , Rfl:   •  Cholecalciferol (VITAMIN D) 2000 units Cap, Take 4,000 Units by mouth., Disp: , Rfl:   •  DiphenhydrAMINE HCl (BENADRYL ALLERGY PO), Take  by mouth., Disp: , Rfl:   •  raNITidine (ZANTAC) 150 MG Tab, Take 150 mg by mouth 2 times a day., Disp: , Rfl:   •  multivitamin (THERAGRAN) Tab, Take 1 Tab by mouth every day., Disp: , Rfl:     Labs: Reviewed    Physical Examination:  Vital signs: /70   Pulse 78   Ht 1.753 m (5' 9\")   Wt 82.1 kg (181 lb)   SpO2 96%   BMI 26.73 kg/m²  Body mass index is 26.73 kg/m². Patient's body mass index is 26.73 kg/m². Exercise and nutrition counseling were performed at this visit.  General: No apparent distress, cooperative  Eyes: No scleral icterus, no discharge, normal eyelids  Neck: No abnormal masses on inspection, normal thyroid exam  Resp: Normal effort, clear to auscultation bilaterally  CVS: Regular rate and rhythm, S1 S2 normal, no murmur  Extremities: No lower extremity edema  Abdomen: abdominal obesity present  Musculoskeletal: Normal digits and nails  Skin: No rash on visible skin  Psych: Alert and oriented, normal mood and affect, intact memory and able to make informed decisions.    Assessment and Plan:    1. Hypogonadism male  His calculated Free T is slightly lower.  When we " did to 50 mg intramuscular testosterone cypionate his calculated free testosterone was higher than 25 ng/dL.  Try testosterone cypionate 225 mg intramuscular every 2 weeks.  We will consider nasal testosterone for future.  Overlap intramuscular injections with nasal testosterone if we decide to go that route    2. High risk medication use  Her hemoglobin and hematocrit closely    3. Vitamin D deficiency  Cont vit D.     4. Vitamin B12 deficiency  Cont  B 12.     Return in about 4 months (around 2/7/2020).    Thank you for allowing me to participate in the care of this patient.    Jerome Zamarripa M.D.  This note was scribed by Mar Mancia RN CDE  CC:   Pratibha Corral M.D.    This note was created using voice recognition software (Dragon). The accuracy of the dictation is limited by the abilities of the software. I have reviewed the note prior to signing, however some errors in grammar and context are still possible. If you have any questions related to this note please do not hesitate to contact our office.

## 2019-10-07 ENCOUNTER — OFFICE VISIT (OUTPATIENT)
Dept: ENDOCRINOLOGY | Facility: MEDICAL CENTER | Age: 69
End: 2019-10-07
Payer: MEDICARE

## 2019-10-07 VITALS
HEART RATE: 78 BPM | WEIGHT: 181 LBS | DIASTOLIC BLOOD PRESSURE: 70 MMHG | SYSTOLIC BLOOD PRESSURE: 122 MMHG | BODY MASS INDEX: 26.81 KG/M2 | HEIGHT: 69 IN | OXYGEN SATURATION: 96 %

## 2019-10-07 DIAGNOSIS — Z79.899 HIGH RISK MEDICATION USE: ICD-10-CM

## 2019-10-07 DIAGNOSIS — E29.1 HYPOGONADISM MALE: ICD-10-CM

## 2019-10-07 DIAGNOSIS — E55.9 VITAMIN D DEFICIENCY: ICD-10-CM

## 2019-10-07 DIAGNOSIS — E53.8 VITAMIN B12 DEFICIENCY: ICD-10-CM

## 2019-10-07 DIAGNOSIS — E29.1 HYPOGONADISM IN MALE: ICD-10-CM

## 2019-10-07 PROCEDURE — 99214 OFFICE O/P EST MOD 30 MIN: CPT | Performed by: INTERNAL MEDICINE

## 2019-10-07 RX ORDER — TESTOSTERONE CYPIONATE 200 MG/ML
225 INJECTION, SOLUTION INTRAMUSCULAR
Qty: 10 ML | Refills: 0 | Status: SHIPPED | OUTPATIENT
Start: 2019-10-07 | End: 2019-12-10 | Stop reason: SDUPTHER

## 2019-10-07 RX ORDER — AMITRIPTYLINE HYDROCHLORIDE 10 MG/1
TABLET, FILM COATED ORAL
COMMUNITY
Start: 2019-08-30

## 2019-10-07 RX ORDER — LISINOPRIL 30 MG/1
30 TABLET ORAL DAILY
COMMUNITY
End: 2020-02-18

## 2019-10-07 RX ORDER — PROPRANOLOL HYDROCHLORIDE 10 MG/1
TABLET ORAL
COMMUNITY
Start: 2019-08-30

## 2019-10-15 ENCOUNTER — NON-PROVIDER VISIT (OUTPATIENT)
Dept: ENDOCRINOLOGY | Facility: MEDICAL CENTER | Age: 69
End: 2019-10-15
Payer: MEDICARE

## 2019-10-15 DIAGNOSIS — E29.1 HYPOGONADISM MALE: ICD-10-CM

## 2019-10-15 PROCEDURE — 96372 THER/PROPH/DIAG INJ SC/IM: CPT | Performed by: INTERNAL MEDICINE

## 2019-10-15 RX ORDER — TESTOSTERONE CYPIONATE 200 MG/ML
226 INJECTION, SOLUTION INTRAMUSCULAR
Status: DISCONTINUED | OUTPATIENT
Start: 2019-10-15 | End: 2020-08-18

## 2019-10-15 RX ADMIN — TESTOSTERONE CYPIONATE 226 MG: 200 INJECTION, SOLUTION INTRAMUSCULAR at 15:37

## 2019-10-15 NOTE — NON-PROVIDER
Jomar Thomas is a 69 y.o. male here for a non-provider visit for Testosterone  injection.    Reason for injection: hypodgonadism  Order in MAR?: Yes  Patient supplied?:No  Minimum interval has been met for this injection (per MAR order): Yes    Order and dose verified by: NK  Patient tolerated injection and no adverse effects were observed or reported: Yes    # of Administrations remaining in MAR: 0

## 2019-10-29 ENCOUNTER — NON-PROVIDER VISIT (OUTPATIENT)
Dept: ENDOCRINOLOGY | Facility: MEDICAL CENTER | Age: 69
End: 2019-10-29
Payer: MEDICARE

## 2019-10-29 PROCEDURE — 96372 THER/PROPH/DIAG INJ SC/IM: CPT | Performed by: INTERNAL MEDICINE

## 2019-10-29 RX ADMIN — TESTOSTERONE CYPIONATE 226 MG: 200 INJECTION, SOLUTION INTRAMUSCULAR at 15:28

## 2019-10-29 NOTE — NON-PROVIDER
Jomar Thomas is a 69 y.o. male here for a non-provider visit for testosterone injection.    Reason for injection: low testosterone  Order in MAR?: Yes  Patient supplied?:No  Minimum interval has been met for this injection (per MAR order): Yes    Order and dose verified by: AT  Patient tolerated injection and no adverse effects were observed or reported: Yes    # of Administrations remaining in MAR: 0

## 2019-11-12 ENCOUNTER — NON-PROVIDER VISIT (OUTPATIENT)
Dept: ENDOCRINOLOGY | Facility: MEDICAL CENTER | Age: 69
End: 2019-11-12
Payer: MEDICARE

## 2019-11-12 PROCEDURE — 96372 THER/PROPH/DIAG INJ SC/IM: CPT | Performed by: INTERNAL MEDICINE

## 2019-11-12 RX ADMIN — TESTOSTERONE CYPIONATE 226 MG: 200 INJECTION, SOLUTION INTRAMUSCULAR at 15:59

## 2019-11-12 NOTE — NON-PROVIDER
Jomar Thomas is a 69 y.o. male here for a non-provider visit for testosterone injection.    Reason for injection: low testosterone  Order in MAR?: Yes  Patient supplied?:Yes  Minimum interval has been met for this injection (per MAR order): No    Order and dose verified by: MV  Patient tolerated injection and no adverse effects were observed or reported: Yes    # of Administrations remaining in MAR: 2

## 2019-11-26 ENCOUNTER — NON-PROVIDER VISIT (OUTPATIENT)
Dept: ENDOCRINOLOGY | Facility: MEDICAL CENTER | Age: 69
End: 2019-11-26
Payer: MEDICARE

## 2019-11-26 ENCOUNTER — APPOINTMENT (OUTPATIENT)
Dept: ENDOCRINOLOGY | Facility: MEDICAL CENTER | Age: 69
End: 2019-11-26
Payer: MEDICARE

## 2019-11-26 PROCEDURE — 96372 THER/PROPH/DIAG INJ SC/IM: CPT | Performed by: INTERNAL MEDICINE

## 2019-11-26 RX ADMIN — TESTOSTERONE CYPIONATE 226 MG: 200 INJECTION, SOLUTION INTRAMUSCULAR at 15:52

## 2019-11-26 NOTE — NON-PROVIDER
Jomar Thomas is a 69 y.o. male here for a non-provider visit for testosterone injection.    Reason for injection: low testosterone  Order in MAR?: Yes  Patient supplied?:No  Minimum interval has been met for this injection (per MAR order): Yes    Order and dose verified by: mv  Patient tolerated injection and no adverse effects were observed or reported: Yes    # of Administrations remaining in MAR: 2

## 2019-12-10 ENCOUNTER — TELEPHONE (OUTPATIENT)
Dept: ENDOCRINOLOGY | Facility: MEDICAL CENTER | Age: 69
End: 2019-12-10

## 2019-12-10 ENCOUNTER — NON-PROVIDER VISIT (OUTPATIENT)
Dept: ENDOCRINOLOGY | Facility: MEDICAL CENTER | Age: 69
End: 2019-12-10
Payer: MEDICARE

## 2019-12-10 DIAGNOSIS — E29.1 HYPOGONADISM IN MALE: ICD-10-CM

## 2019-12-10 PROCEDURE — 96372 THER/PROPH/DIAG INJ SC/IM: CPT | Performed by: INTERNAL MEDICINE

## 2019-12-10 RX ORDER — TESTOSTERONE CYPIONATE 200 MG/ML
200 INJECTION, SOLUTION INTRAMUSCULAR
Qty: 10 ML | Refills: 1 | Status: SHIPPED | OUTPATIENT
Start: 2019-12-10 | End: 2020-04-28

## 2019-12-10 RX ADMIN — TESTOSTERONE CYPIONATE 226 MG: 200 INJECTION, SOLUTION INTRAMUSCULAR at 15:36

## 2019-12-10 NOTE — TELEPHONE ENCOUNTER
1. Caller Name: Jomar Park [facility]                                         Call Back Number: [phone]      Patient approves a detailed voicemail message: yes    Patient was in today for his testosterone injection. He stated that Dr. Zamarripa wrote down the name of the nasal spray. He needs the dosage. To take to [facility].

## 2019-12-10 NOTE — NON-PROVIDER
Jomar Thomas is a 69 y.o. male here for a non-provider visit for testosterone injection.    Reason for injection: low testosterone  Order in MAR?: Yes  Patient supplied?:No  Minimum interval has been met for this injection (per MAR order): Yes    Order and dose verified by: at  Patient tolerated injection and no adverse effects were observed or reported: Yes    # of Administrations remaining in MAR: 2

## 2019-12-12 NOTE — TELEPHONE ENCOUNTER
Phone Number Called: [phone] (home) [phone] (work)    Call outcome: left message for patient to call back regarding message below    Message: Contacted patient but no answer [facility] only [facility]. Dr. Macias could write him a Rx for injectable testosterone if he wants to go [facility].

## 2019-12-24 ENCOUNTER — NON-PROVIDER VISIT (OUTPATIENT)
Dept: ENDOCRINOLOGY | Facility: MEDICAL CENTER | Age: 69
End: 2019-12-24
Payer: MEDICARE

## 2019-12-24 PROCEDURE — 96372 THER/PROPH/DIAG INJ SC/IM: CPT | Performed by: INTERNAL MEDICINE

## 2019-12-24 RX ADMIN — TESTOSTERONE CYPIONATE 226 MG: 200 INJECTION, SOLUTION INTRAMUSCULAR at 15:02

## 2019-12-24 NOTE — PROGRESS NOTES
Jomar Thomas is a 69 y.o. male here for a non-provider visit for testosterone injection.    Reason for injection: Hypogonadism in male   Order in MAR?: Yes  Patient supplied?:No  Minimum interval has been met for this injection (per MAR order): Yes    Order and dose verified by: an  Patient tolerated injection and no adverse effects were observed or reported: Yes    # of Administrations remaining in MAR: 2

## 2020-01-07 ENCOUNTER — NON-PROVIDER VISIT (OUTPATIENT)
Dept: ENDOCRINOLOGY | Facility: MEDICAL CENTER | Age: 70
End: 2020-01-07
Payer: MEDICARE

## 2020-01-07 PROCEDURE — 96372 THER/PROPH/DIAG INJ SC/IM: CPT | Performed by: INTERNAL MEDICINE

## 2020-01-07 RX ADMIN — TESTOSTERONE CYPIONATE 226 MG: 200 INJECTION, SOLUTION INTRAMUSCULAR at 15:54

## 2020-01-07 NOTE — NON-PROVIDER
Jomar Thomas is a 69 y.o. male here for a non-provider visit for testosterone injection.    Reason for injection: low testosterone  Order in MAR?: Yes  Patient supplied?:No  Minimum interval has been met for this injection (per MAR order): Yes    Order and dose verified by: LH  Patient tolerated injection and no adverse effects were observed or reported: Yes    # of Administrations remaining in MAR: 2

## 2020-01-21 ENCOUNTER — NON-PROVIDER VISIT (OUTPATIENT)
Dept: ENDOCRINOLOGY | Facility: MEDICAL CENTER | Age: 70
End: 2020-01-21
Payer: MEDICARE

## 2020-01-21 PROCEDURE — 96372 THER/PROPH/DIAG INJ SC/IM: CPT | Performed by: INTERNAL MEDICINE

## 2020-01-21 RX ADMIN — TESTOSTERONE CYPIONATE 226 MG: 200 INJECTION, SOLUTION INTRAMUSCULAR at 15:39

## 2020-02-04 ENCOUNTER — NON-PROVIDER VISIT (OUTPATIENT)
Dept: ENDOCRINOLOGY | Facility: MEDICAL CENTER | Age: 70
End: 2020-02-04
Payer: MEDICARE

## 2020-02-04 DIAGNOSIS — E29.1 HYPOGONADISM MALE: ICD-10-CM

## 2020-02-04 PROCEDURE — 96372 THER/PROPH/DIAG INJ SC/IM: CPT | Performed by: INTERNAL MEDICINE

## 2020-02-04 RX ADMIN — TESTOSTERONE CYPIONATE 226 MG: 200 INJECTION, SOLUTION INTRAMUSCULAR at 15:39

## 2020-02-04 NOTE — NON-PROVIDER
Jomar Thomas is a 69 y.o. male here for a non-provider visit for Testosterone injection.    Reason for injection: Hypogonadism  Order in MAR?: Yes  Patient supplied?:No  Minimum interval has been met for this injection (per MAR order): Yes    Order and dose verified by: MELQUIADES  Patient tolerated injection and no adverse effects were observed or reported: Yes    # of Administrations remaining in MAR: 2

## 2020-02-10 ENCOUNTER — APPOINTMENT (RX ONLY)
Dept: URBAN - METROPOLITAN AREA CLINIC 35 | Facility: CLINIC | Age: 70
Setting detail: DERMATOLOGY
End: 2020-02-10

## 2020-02-10 DIAGNOSIS — D22 MELANOCYTIC NEVI: ICD-10-CM

## 2020-02-10 DIAGNOSIS — L20.89 OTHER ATOPIC DERMATITIS: ICD-10-CM

## 2020-02-10 DIAGNOSIS — Z71.89 OTHER SPECIFIED COUNSELING: ICD-10-CM

## 2020-02-10 DIAGNOSIS — L81.4 OTHER MELANIN HYPERPIGMENTATION: ICD-10-CM

## 2020-02-10 DIAGNOSIS — L82.1 OTHER SEBORRHEIC KERATOSIS: ICD-10-CM

## 2020-02-10 DIAGNOSIS — D485 NEOPLASM OF UNCERTAIN BEHAVIOR OF SKIN: ICD-10-CM | Status: INADEQUATELY CONTROLLED

## 2020-02-10 PROBLEM — D22.5 MELANOCYTIC NEVI OF TRUNK: Status: ACTIVE | Noted: 2020-02-10

## 2020-02-10 PROBLEM — L20.84 INTRINSIC (ALLERGIC) ECZEMA: Status: ACTIVE | Noted: 2020-02-10

## 2020-02-10 PROBLEM — D48.5 NEOPLASM OF UNCERTAIN BEHAVIOR OF SKIN: Status: ACTIVE | Noted: 2020-02-10

## 2020-02-10 PROCEDURE — ? COUNSELING

## 2020-02-10 PROCEDURE — ? KOH PREP

## 2020-02-10 PROCEDURE — 99213 OFFICE O/P EST LOW 20 MIN: CPT | Mod: 25

## 2020-02-10 PROCEDURE — ? TREATMENT REGIMEN

## 2020-02-10 PROCEDURE — 11104 PUNCH BX SKIN SINGLE LESION: CPT

## 2020-02-10 PROCEDURE — ? BIOPSY BY PUNCH METHOD

## 2020-02-10 ASSESSMENT — LOCATION ZONE DERM
LOCATION ZONE: LEG
LOCATION ZONE: TRUNK
LOCATION ZONE: ARM
LOCATION ZONE: FACE

## 2020-02-10 ASSESSMENT — LOCATION SIMPLE DESCRIPTION DERM
LOCATION SIMPLE: RIGHT ZYGOMA
LOCATION SIMPLE: ABDOMEN
LOCATION SIMPLE: RIGHT SHOULDER
LOCATION SIMPLE: RIGHT ANKLE
LOCATION SIMPLE: LEFT UPPER BACK
LOCATION SIMPLE: RIGHT UPPER BACK

## 2020-02-10 ASSESSMENT — LOCATION DETAILED DESCRIPTION DERM
LOCATION DETAILED: RIGHT POSTERIOR SHOULDER
LOCATION DETAILED: RIGHT INFERIOR UPPER BACK
LOCATION DETAILED: LEFT SUPERIOR UPPER BACK
LOCATION DETAILED: RIGHT SUPERIOR UPPER BACK
LOCATION DETAILED: EPIGASTRIC SKIN
LOCATION DETAILED: LEFT SUPERIOR LATERAL UPPER BACK
LOCATION DETAILED: RIGHT CENTRAL ZYGOMA
LOCATION DETAILED: RIGHT ANKLE

## 2020-02-10 NOTE — PROCEDURE: BIOPSY BY PUNCH METHOD
Detail Level: Detailed
Was A Bandage Applied: Yes
Punch Size In Mm: 3
Biopsy Type: H and E
Anesthesia Type: 0.5% lidocaine with 1:200,000 epinephrine and a 1:10 solution of 8.4% sodium bicarbonate
Anesthesia Volume In Cc: 0.6
Additional Anesthesia Volume In Cc (Will Not Render If 0): 0
Hemostasis: None
Epidermal Sutures: 6-0 Nylon
Number Of Epidermal Sutures (Optional): 2
Wound Care: Petrolatum
Dressing: bandage
Suture Removal: 7 days
Patient Will Remove Sutures At Home?: No
Size Of Lesion In Cm (Optional): 0.3
Lab: [id number]
Lab Facility: 
Consent: Written consent was obtained and risks were reviewed including but not limited to scarring, infection, bleeding, scabbing, incomplete removal, nerve damage and allergy to anesthesia.
Post-Care Instructions: I reviewed with the patient in detail post-care instructions. Patient is to keep the biopsy site dry overnight, and then change the bandage and apply petrolatum once daily until sutures are removed.  Use a clean q-tip to apply the petrolatum and avoid touching the biopsy site with your hands.  Avoid immersing in water such as bathtub or swimming pools. Any concerns about a wound infection come into the office for a walk in visit Monday through Friday 8:30 am to 12 pm or 1 pm to 4:30 pm Signs of infection include increasing pain, redness, and drainage from biopsy site.  If we are not in the office, please call through the answering service.
Home Suture Removal Text: Patient will remove their sutures at home.  If they have any questions or difficulties they will call the office.
Notification Instructions: Patient will be notified of biopsy results. However, patient instructed to call the office if not contacted within 2 weeks.
Billing Type: Third-Party Bill

## 2020-02-12 ENCOUNTER — HOSPITAL ENCOUNTER (OUTPATIENT)
Dept: LAB | Facility: MEDICAL CENTER | Age: 70
End: 2020-02-12
Attending: UROLOGY
Payer: MEDICARE

## 2020-02-12 ENCOUNTER — HOSPITAL ENCOUNTER (OUTPATIENT)
Dept: LAB | Facility: MEDICAL CENTER | Age: 70
End: 2020-02-12
Attending: INTERNAL MEDICINE
Payer: MEDICARE

## 2020-02-12 DIAGNOSIS — E53.8 VITAMIN B12 DEFICIENCY: ICD-10-CM

## 2020-02-12 DIAGNOSIS — Z79.899 HIGH RISK MEDICATION USE: ICD-10-CM

## 2020-02-12 DIAGNOSIS — E55.9 VITAMIN D DEFICIENCY: ICD-10-CM

## 2020-02-12 DIAGNOSIS — E29.1 HYPOGONADISM MALE: ICD-10-CM

## 2020-02-12 LAB
25(OH)D3 SERPL-MCNC: 46 NG/ML (ref 30–100)
ERYTHROCYTE [DISTWIDTH] IN BLOOD BY AUTOMATED COUNT: 59.5 FL (ref 35.9–50)
HCT VFR BLD AUTO: 48.9 % (ref 42–52)
HGB BLD-MCNC: 16.5 G/DL (ref 14–18)
MCH RBC QN AUTO: 35.3 PG (ref 27–33)
MCHC RBC AUTO-ENTMCNC: 33.7 G/DL (ref 33.7–35.3)
MCV RBC AUTO: 104.7 FL (ref 81.4–97.8)
PLATELET # BLD AUTO: 168 K/UL (ref 164–446)
PMV BLD AUTO: 9.6 FL (ref 9–12.9)
PSA SERPL-MCNC: 2.81 NG/ML (ref 0–4)
RBC # BLD AUTO: 4.67 M/UL (ref 4.7–6.1)
TESTOST SERPL-MCNC: 804 NG/DL (ref 175–781)
VIT B12 SERPL-MCNC: 1285 PG/ML (ref 211–911)
WBC # BLD AUTO: 7.9 K/UL (ref 4.8–10.8)

## 2020-02-12 PROCEDURE — 36415 COLL VENOUS BLD VENIPUNCTURE: CPT

## 2020-02-12 PROCEDURE — 85027 COMPLETE CBC AUTOMATED: CPT

## 2020-02-12 PROCEDURE — 84403 ASSAY OF TOTAL TESTOSTERONE: CPT

## 2020-02-12 PROCEDURE — 82306 VITAMIN D 25 HYDROXY: CPT

## 2020-02-12 PROCEDURE — 82607 VITAMIN B-12: CPT

## 2020-02-12 PROCEDURE — 84153 ASSAY OF PSA TOTAL: CPT | Mod: GA

## 2020-02-12 PROCEDURE — 84270 ASSAY OF SEX HORMONE GLOBUL: CPT

## 2020-02-13 LAB — SHBG SERPL-SCNC: 46 NMOL/L (ref 11–80)

## 2020-02-17 NOTE — PROGRESS NOTES
Endocrinology Clinic Progress Note  PCP: Pratibha Corral M.D.    HPI:  River Connolly is a 69 y.o. old patient who comes in today for review of endocrine problems.  Went for heart cath  Today and there was no evidence of significant coronary artery diease.     Hypogonadism:  Currently taking Testosterone Cypionate 226 mg IM every 14 days.  Results for RIVER CONNOLLY (MRN [id number]) as of 2/17/2020 14:04   Ref. Range 2/12/2020 08:02   Sex Hormone Bind Globulin Latest Ref Range: 11 - 80 nmol/L 46   Testosterone,Total Latest Ref Range: 175 - 781 ng/dL 804 (H)   Calculated Free Testosterone is 15.1 ng/dl.    Results for RIVER CONNOLLY (MRN [id number]) as of 2/17/2020 14:04   Ref. Range 2/12/2020 08:02   Hemoglobin Latest Ref Range: 14.0 - 18.0 g/dL 16.5   Hematocrit Latest Ref Range: 42.0 - 52.0 % 48.9       Vitamin D Deficiency:  Currently taking Vitamin D 4000 units daily.  Results for RIVER CONNOLLY (MRN [id number]) as of 2/17/2020 14:04   Ref. Range 2/12/2020 08:02   25-Hydroxy   Vitamin D 25 Latest Ref Range: 30 - 100 ng/mL 46     Vitamin B 12 Deficiency: Currently taking Vitamin B 12 1000 mcg IM every monthy.  Results for RIVER CONNOLLY (MRN [id number]) as of 2/17/2020 14:04   Ref. Range 2/12/2020 08:02   Vitamin B12 -True Cobalamin Latest Ref Range: 211 - 911 pg/mL 1285 (H)           ROS:  Constitutional: No unintentional weight loss  Endo: Denies excessive thirst or frequent urination  All other systems were reviewed and were negative.    Past Medical History:  Patient Active Problem List    Diagnosis Date Noted   • Dilated aortic root (HCC) 10/23/2018   • Elevated PSA 08/28/2018   • Intrinsic eczema 08/28/2018   • Peripheral edema 08/28/2018   • Mild concentric left ventricular hypertrophy (LVH) 08/28/2018   • Strain of lumbar region 07/17/2018   • Elevated serum free T4 level 05/22/2018   • Elevated carcinoembryonic antigen (CEA) 02/15/2018   • Chronic seasonal allergic rhinitis  "due to pollen 02/15/2018   • Age-related cataract of both eyes 02/15/2018   • Gastroesophageal reflux disease without esophagitis 02/15/2018   • Inflammatory bowel disease 02/15/2018   • History of Bell's palsy 02/15/2018   • DDD (degenerative disc disease), lumbar 02/15/2018   • Elevated C-reactive protein (CRP) 02/15/2018   • Homocysteinemia (HCC) 02/15/2018   • Family history of colon cancer in mother 02/15/2018   • Family history of pancreatic cancer 02/15/2018   • Other fatigue 03/16/2017   • Central serous retinopathy 10/17/2011       Medications:    Current Outpatient Medications:   •  lisinopril (PRINIVIL) 40 MG tablet, Take 40 mg by mouth every day., Disp: , Rfl:   •  amLODIPine (NORVASC) 5 MG Tab, Take 5 mg by mouth every day., Disp: , Rfl:   •  famotidine (PEPCID) 20 MG Tab, Take 20 mg by mouth 2 times a day., Disp: , Rfl:   •  XARELTO 20 MG Tab tablet, , Disp: , Rfl:   •  testosterone cypionate (DEPO-TESTOSTERONE) 200 MG/ML Solution injection, 1 mL by Intramuscular route every 14 days for 140 days. (Patient taking differently: 226 mg by Intramuscular route every 14 days.), Disp: 10 mL, Rfl: 1  •  amitriptyline (ELAVIL) 10 MG Tab, , Disp: , Rfl:   •  propranolol (INDERAL) 10 MG Tab, , Disp: , Rfl:   •  Syringe/Needle, Disp, (SYRINGE 3CC/81NC5-1/2\") 22G X 1-1/2\" 3 ML Misc, For use with Intra-muscular testosterone  injection every 2 weeks., Disp: 12 Each, Rfl: 3  •  cyanocobalamin (VITAMIN B-12) 1000 MCG/ML Solution, 1,000 mcg by Intramuscular route every 30 days., Disp: , Rfl:   •  Cholecalciferol (VITAMIN D) 2000 units Cap, Take 4,000 Units by mouth., Disp: , Rfl:   •  DiphenhydrAMINE HCl (BENADRYL ALLERGY PO), Take  by mouth., Disp: , Rfl:   •  multivitamin (THERAGRAN) Tab, Take 1 Tab by mouth every day., Disp: , Rfl:     Current Facility-Administered Medications:   •  testosterone cypionate (DEPO-TESTOSTERONE) injection 226 mg, 226 mg, Intramuscular, Q14 DAYS, Jerome Zamarripa M.D., 226 mg at 02/18/20 " "[id number]    Labs: Reviewed    Physical Examination:  Vital signs: Pulse 72   Ht 1.753 m (5' 9\")   Wt 78.9 kg (174 lb)   SpO2 97%   BMI 25.70 kg/m²  Body mass index is 25.7 kg/m². Patient's body mass index is 25.7 kg/m².   General: No apparent distress, cooperative  Eyes: No scleral icterus or discharge  ENMT: Normal on external inspection of nose, lips, normal thyroid exam  Neck: No abnormal masses on inspection  Resp: Normal effort, clear to auscultation bilaterally   CVS: Regular rate and rhythm, S1 S2 normal, no murmur   Extremities: No edema  Abdomen: abdominal obesity present  Neuro: Alert and oriented  Skin: No rash  Psych: Normal mood and affect, intact memory and able to make informed decisions      Assessment and Plan:    1. Hypogonadism male  Continue current regimen; doing well overall.     2. High risk medication use  Monitor hb and ht closely.     3. Vitamin D deficiency  Cont vit d with food as per HPI    4. Vitamin B12 deficiency  Continue  over the counter sublingual B 12 - 3564-8209 mcg daily.   Side effects and benefits discussed with patient in detail.     Return in about 3 months (around 5/18/2020).    Thank you for allowing me to participate in the care of this patient.        CC:   Pratibha Corral M.D.    This note was created using voice recognition software (Dragon). The accuracy of the dictation is limited by the abilities of the software. I have reviewed the note prior to signing, however some errors in grammar and context are still possible. If you have any questions related to this note please do not hesitate to contact our office.     "

## 2020-02-18 ENCOUNTER — OFFICE VISIT (OUTPATIENT)
Dept: ENDOCRINOLOGY | Facility: MEDICAL CENTER | Age: 70
End: 2020-02-18
Payer: MEDICARE

## 2020-02-18 VITALS — OXYGEN SATURATION: 97 % | HEART RATE: 72 BPM | HEIGHT: 69 IN | BODY MASS INDEX: 25.77 KG/M2 | WEIGHT: 174 LBS

## 2020-02-18 DIAGNOSIS — E55.9 VITAMIN D DEFICIENCY: ICD-10-CM

## 2020-02-18 DIAGNOSIS — E29.1 HYPOGONADISM MALE: ICD-10-CM

## 2020-02-18 DIAGNOSIS — Z79.899 HIGH RISK MEDICATION USE: ICD-10-CM

## 2020-02-18 DIAGNOSIS — E53.8 VITAMIN B12 DEFICIENCY: ICD-10-CM

## 2020-02-18 PROCEDURE — 96372 THER/PROPH/DIAG INJ SC/IM: CPT | Performed by: INTERNAL MEDICINE

## 2020-02-18 PROCEDURE — 99214 OFFICE O/P EST MOD 30 MIN: CPT | Mod: 25 | Performed by: INTERNAL MEDICINE

## 2020-02-18 RX ORDER — FAMOTIDINE 20 MG/1
20 TABLET, FILM COATED ORAL 2 TIMES DAILY
COMMUNITY

## 2020-02-18 RX ORDER — LISINOPRIL 40 MG/1
40 TABLET ORAL DAILY
COMMUNITY

## 2020-02-18 RX ORDER — AMLODIPINE BESYLATE 5 MG/1
5 TABLET ORAL DAILY
COMMUNITY

## 2020-02-18 RX ORDER — RIVAROXABAN 20 MG/1
TABLET, FILM COATED ORAL
COMMUNITY
Start: 2020-01-05

## 2020-02-18 RX ADMIN — TESTOSTERONE CYPIONATE 226 MG: 200 INJECTION, SOLUTION INTRAMUSCULAR at 15:52

## 2020-02-19 ENCOUNTER — APPOINTMENT (RX ONLY)
Dept: URBAN - METROPOLITAN AREA CLINIC 35 | Facility: CLINIC | Age: 70
Setting detail: DERMATOLOGY
End: 2020-02-19

## 2020-02-19 DIAGNOSIS — D485 NEOPLASM OF UNCERTAIN BEHAVIOR OF SKIN: ICD-10-CM

## 2020-02-19 DIAGNOSIS — Z48.02 ENCOUNTER FOR REMOVAL OF SUTURES: ICD-10-CM

## 2020-02-19 PROBLEM — D48.5 NEOPLASM OF UNCERTAIN BEHAVIOR OF SKIN: Status: ACTIVE | Noted: 2020-02-19

## 2020-02-19 PROCEDURE — 11104 PUNCH BX SKIN SINGLE LESION: CPT

## 2020-02-19 PROCEDURE — ? BIOPSY BY PUNCH METHOD

## 2020-02-19 PROCEDURE — ? SUTURE REMOVAL (GLOBAL PERIOD)

## 2020-02-19 ASSESSMENT — LOCATION ZONE DERM: LOCATION ZONE: FACE

## 2020-02-19 ASSESSMENT — LOCATION DETAILED DESCRIPTION DERM
LOCATION DETAILED: RIGHT SUPERIOR CENTRAL MALAR CHEEK
LOCATION DETAILED: RIGHT CENTRAL ZYGOMA

## 2020-02-19 ASSESSMENT — LOCATION SIMPLE DESCRIPTION DERM
LOCATION SIMPLE: RIGHT CHEEK
LOCATION SIMPLE: RIGHT ZYGOMA

## 2020-02-19 NOTE — PROCEDURE: SUTURE REMOVAL (GLOBAL PERIOD)
Detail Level: Detailed
Add 22752 Cpt? (Important Note: In 2017 The Use Of 82183 Is Being Tracked By Cms To Determine Future Global Period Reimbursement For Global Periods): no

## 2020-02-19 NOTE — PROCEDURE: BIOPSY BY PUNCH METHOD
Suture Removal: 7 days
Consent: Written consent was obtained and risks were reviewed including but not limited to scarring, infection, bleeding, scabbing, incomplete removal, nerve damage and allergy to anesthesia.
Biopsy Type: H and E
Render In Bullet Format When Appropriate: No
Detail Level: Detailed
X Depth Of Punch In Cm (Optional): 0
Punch Size In Mm: 3
Render Post-Care Instructions In Note?: yes
Anesthesia Type: 0.5% lidocaine with 1:200,000 epinephrine and a 1:10 solution of 8.4% sodium bicarbonate
Hemostasis: None
Number Of Epidermal Sutures (Optional): 2
Size Of Lesion In Cm (Optional): 0.3
Home Suture Removal Text: Patient will remove their sutures at home.  If they have any questions or difficulties they will call the office.
Dressing: bandage
Anesthesia Volume In Cc: 0.6
Lab: [id number]
Billing Type: Third-Party Bill
Epidermal Sutures: 6-0 Nylon
Notification Instructions: Patient will be notified of biopsy results. However, patient instructed to call the office if not contacted within 2 weeks.
Lab Facility: 
Post-Care Instructions: I reviewed with the patient in detail post-care instructions. Patient is to keep the biopsy site dry overnight, and then change the bandage and apply petrolatum once daily until sutures are removed.  Use a clean q-tip to apply the petrolatum and avoid touching the biopsy site with your hands.  Avoid immersing in water such as bathtub or swimming pools. Any concerns about a wound infection come into the office for a walk in visit Monday through Friday 8:30 am to 12 pm or 1 pm to 4:30 pm Signs of infection include increasing pain, redness, and drainage from biopsy site.  If we are not in the office, please call through the answering service.
Wound Care: Petrolatum

## 2020-02-24 ENCOUNTER — APPOINTMENT (RX ONLY)
Dept: URBAN - METROPOLITAN AREA CLINIC 4 | Facility: CLINIC | Age: 70
Setting detail: DERMATOLOGY
End: 2020-02-24

## 2020-02-24 PROBLEM — C44.319 BASAL CELL CARCINOMA OF SKIN OF OTHER PARTS OF FACE: Status: ACTIVE | Noted: 2020-02-24

## 2020-02-24 PROCEDURE — ? PATIENT SPECIFIC COUNSELING

## 2020-02-24 PROCEDURE — ? MOHS SURGERY PHONE CONSULTATION

## 2020-02-24 NOTE — PROCEDURE: MOHS SURGERY PHONE CONSULTATION
Has The Patient Ever Received A Transplant?: No
[facility]
Referring Provider: Dr Jacqueline Oden
Detail Level: Detailed
Date Of Mohs Surgery: 03/11/2020
Pathology Accession #: [id number]
Office Location Of Mohs Surgery: [place] [address]
Which Antibiotic Do They Take For Surgical Prophylaxis?: Amoxicillin (2 grams)
Time Of Mohs Surgery: 8:10 am
Has The Pathology Report Been Received?: Yes
Patient Reported Location: Right central zygoma
If Yes- What Blood Thinners Do They Take?: Xeralto

## 2020-03-03 ENCOUNTER — NON-PROVIDER VISIT (OUTPATIENT)
Dept: ENDOCRINOLOGY | Facility: MEDICAL CENTER | Age: 70
End: 2020-03-03
Payer: MEDICARE

## 2020-03-03 DIAGNOSIS — E29.1 HYPOGONADISM MALE: ICD-10-CM

## 2020-03-03 PROCEDURE — 96372 THER/PROPH/DIAG INJ SC/IM: CPT | Performed by: INTERNAL MEDICINE

## 2020-03-03 RX ADMIN — TESTOSTERONE CYPIONATE 226 MG: 200 INJECTION, SOLUTION INTRAMUSCULAR at 15:29

## 2020-03-11 ENCOUNTER — APPOINTMENT (RX ONLY)
Dept: URBAN - METROPOLITAN AREA CLINIC 36 | Facility: CLINIC | Age: 70
Setting detail: DERMATOLOGY
End: 2020-03-11

## 2020-03-11 PROBLEM — C44.91 BASAL CELL CARCINOMA OF SKIN, UNSPECIFIED: Status: ACTIVE | Noted: 2020-03-11

## 2020-03-11 PROCEDURE — ? INJECTION

## 2020-03-11 PROCEDURE — 11900 INJECT SKIN LESIONS </W 7: CPT

## 2020-03-11 NOTE — PROCEDURE: INJECTION
Consent: The risks of the medication was reviewed with the patient.
Dose Administered (Numbers Only - Mg, G, Mcg, Units, Cc): 0.2
units
Detail Level: None
Hide Second Medication?: No
Procedure Information: Please note that the numeric value listed in the Medication (1) and associated J-code units and Medication (2) and associated J-code units variables are j-code amounts and do not represent either the concentration or the total amount of the medications injected.  I strongly recommend selecting no to the Render J-code information in note question. This will allow your note to be more clear. If you are billing j-codes with your injection codes you need to document the total amount of the medication injected. This amount should match the j-code units. For example, if you are injecting Triamcinolone 40mg as an intramuscular injection you would select 40 for the dose field and mg for the units. This would allow you to document  with 4 units (40mg = 10mg x 4). The total volume is not used to calculate j-codes only the amount of the medication administered.
Medication (1) And Associated J-Code Units: Bleomycin, 15 units
Dose Administered (Numbers Only - Mg, G, Mcg, Units, Cc): 0
Render J-Code Information In Note?: yes
Post-Care Instructions: I reviewed with the patient in detail post-care instructions. Patient understands to keep the injection sites clean and call the clinic if there is any redness, swelling or pain.
Treatment Number: 1
Route: IL

## 2020-03-18 ENCOUNTER — NON-PROVIDER VISIT (OUTPATIENT)
Dept: ENDOCRINOLOGY | Facility: MEDICAL CENTER | Age: 70
End: 2020-03-18
Payer: MEDICARE

## 2020-03-18 PROCEDURE — 96372 THER/PROPH/DIAG INJ SC/IM: CPT | Performed by: INTERNAL MEDICINE

## 2020-03-18 RX ADMIN — TESTOSTERONE CYPIONATE 226 MG: 200 INJECTION, SOLUTION INTRAMUSCULAR at 12:06

## 2020-04-01 ENCOUNTER — APPOINTMENT (RX ONLY)
Dept: URBAN - METROPOLITAN AREA CLINIC 36 | Facility: CLINIC | Age: 70
Setting detail: DERMATOLOGY
End: 2020-04-01

## 2020-04-01 PROBLEM — C44.91 BASAL CELL CARCINOMA OF SKIN, UNSPECIFIED: Status: ACTIVE | Noted: 2020-04-01

## 2020-04-01 PROCEDURE — 11900 INJECT SKIN LESIONS </W 7: CPT

## 2020-04-01 PROCEDURE — ? INJECTION

## 2020-04-01 NOTE — PROCEDURE: INJECTION
Hide Second Medication?: No
Medication (1) And Associated J-Code Units: Bleomycin, 15 units
Procedure Information: Please note that the numeric value listed in the Medication (1) and associated J-code units and Medication (2) and associated J-code units variables are j-code amounts and do not represent either the concentration or the total amount of the medications injected.  I strongly recommend selecting no to the Render J-code information in note question. This will allow your note to be more clear. If you are billing j-codes with your injection codes you need to document the total amount of the medication injected. This amount should match the j-code units. For example, if you are injecting Triamcinolone 40mg as an intramuscular injection you would select 40 for the dose field and mg for the units. This would allow you to document  with 4 units (40mg = 10mg x 4). The total volume is not used to calculate j-codes only the amount of the medication administered.
units
Treatment Number: 2
Post-Care Instructions: I reviewed with the patient in detail post-care instructions. Patient understands to keep the injection sites clean and call the clinic if there is any redness, swelling or pain.
Bill J-Code: yes
Dose Administered (Numbers Only - Mg, G, Mcg, Units, Cc): 0
Detail Level: None
Route: IL
Consent: The risks of the medication was reviewed with the patient.
Dose Administered (Numbers Only - Mg, G, Mcg, Units, Cc): 0.2

## 2020-04-02 ENCOUNTER — APPOINTMENT (OUTPATIENT)
Dept: ENDOCRINOLOGY | Facility: MEDICAL CENTER | Age: 70
End: 2020-04-02
Payer: MEDICARE

## 2020-04-21 ENCOUNTER — NON-PROVIDER VISIT (OUTPATIENT)
Dept: ENDOCRINOLOGY | Facility: MEDICAL CENTER | Age: 70
End: 2020-04-21
Payer: MEDICARE

## 2020-04-21 PROCEDURE — 96372 THER/PROPH/DIAG INJ SC/IM: CPT | Performed by: INTERNAL MEDICINE

## 2020-04-21 RX ADMIN — TESTOSTERONE CYPIONATE 226 MG: 200 INJECTION, SOLUTION INTRAMUSCULAR at 15:10

## 2020-04-21 NOTE — NON-PROVIDER
Jomar Thomas is a 69 y.o. male here for a non-provider visit for Testosterone injection.    Reason for injection: low Testosterone  Order in MAR?: Yes  Patient supplied?:No  Minimum interval has been met for this injection (per MAR order): Yes    Order and dose verified by: MELQUIADES  Patient tolerated injection and no adverse effects were observed or reported: Yes    # of Administrations remaining in MAR: 2

## 2020-04-22 ENCOUNTER — APPOINTMENT (RX ONLY)
Dept: URBAN - METROPOLITAN AREA CLINIC 35 | Facility: CLINIC | Age: 70
Setting detail: DERMATOLOGY
End: 2020-04-22

## 2020-04-22 DIAGNOSIS — D485 NEOPLASM OF UNCERTAIN BEHAVIOR OF SKIN: ICD-10-CM

## 2020-04-22 PROBLEM — D48.5 NEOPLASM OF UNCERTAIN BEHAVIOR OF SKIN: Status: ACTIVE | Noted: 2020-04-22

## 2020-04-22 PROCEDURE — ? BIOPSY BY PUNCH METHOD

## 2020-04-22 PROCEDURE — 11104 PUNCH BX SKIN SINGLE LESION: CPT

## 2020-04-22 ASSESSMENT — LOCATION ZONE DERM: LOCATION ZONE: LEG

## 2020-04-22 ASSESSMENT — LOCATION DETAILED DESCRIPTION DERM: LOCATION DETAILED: LEFT ANTERIOR PROXIMAL THIGH

## 2020-04-22 ASSESSMENT — LOCATION SIMPLE DESCRIPTION DERM: LOCATION SIMPLE: LEFT THIGH

## 2020-04-22 NOTE — PROCEDURE: BIOPSY BY PUNCH METHOD
Detail Level: Detailed
Was A Bandage Applied: Yes
Punch Size In Mm: 8
Biopsy Type: H and E
Anesthesia Type: 0.5% lidocaine with 1:200,000 epinephrine and a 1:10 solution of 8.4% sodium bicarbonate
Anesthesia Volume In Cc: 0.6
Additional Anesthesia Volume In Cc (Will Not Render If 0): 0
Hemostasis: None
Epidermal Sutures: 4-0 Nylon
Wound Care: Petrolatum
Dressing: bandage
Suture Removal: 14 days
Patient Will Remove Sutures At Home?: No
Size Of Lesion In Cm (Optional): 1
Lab: [id number]
Lab Facility: 
Consent: Written consent was obtained and risks were reviewed including but not limited to scarring, infection, bleeding, scabbing, incomplete removal, nerve damage and allergy to anesthesia.
Post-Care Instructions: I reviewed with the patient in detail post-care instructions. Patient is to keep the biopsy site dry overnight, and then change the bandage and apply petrolatum once daily until sutures are removed.  Use a clean q-tip to apply the petrolatum and avoid touching the biopsy site with your hands.  Avoid immersing in water such as bathtub or swimming pools. Any concerns about a wound infection come into the office for a walk in visit Monday through Friday 8:30 am to 12 pm or 1 pm to 4:30 pm Signs of infection include increasing pain, redness, and drainage from biopsy site.  If we are not in the office, please call through the answering service.
Home Suture Removal Text: Patient will remove their sutures at home.  If they have any questions or difficulties they will call the office.
Notification Instructions: Patient will be notified of biopsy results. However, patient instructed to call the office if not contacted within 2 weeks.
Billing Type: Third-Party Bill
Information: Selecting Yes will display possible errors in your note based on the variables you have selected. This validation is only offered as a suggestion for you. PLEASE NOTE THAT THE VALIDATION TEXT WILL BE REMOVED WHEN YOU FINALIZE YOUR NOTE. IF YOU WANT TO FAX A PRELIMINARY NOTE YOU WILL NEED TO TOGGLE THIS TO 'NO' IF YOU DO NOT WANT IT IN YOUR FAXED NOTE.

## 2020-05-05 ENCOUNTER — APPOINTMENT (OUTPATIENT)
Dept: ENDOCRINOLOGY | Facility: MEDICAL CENTER | Age: 70
End: 2020-05-05
Payer: MEDICARE

## 2020-05-06 ENCOUNTER — APPOINTMENT (RX ONLY)
Dept: URBAN - METROPOLITAN AREA CLINIC 35 | Facility: CLINIC | Age: 70
Setting detail: DERMATOLOGY
End: 2020-05-06

## 2020-05-06 DIAGNOSIS — Z48.02 ENCOUNTER FOR REMOVAL OF SUTURES: ICD-10-CM

## 2020-05-06 PROCEDURE — ? SUTURE REMOVAL (NO GLOBAL PERIOD)

## 2020-05-06 PROCEDURE — ? ADDITIONAL NOTES

## 2020-05-06 ASSESSMENT — LOCATION SIMPLE DESCRIPTION DERM: LOCATION SIMPLE: LEFT THIGH

## 2020-05-06 ASSESSMENT — LOCATION DETAILED DESCRIPTION DERM: LOCATION DETAILED: LEFT ANTERIOR PROXIMAL THIGH

## 2020-05-06 ASSESSMENT — LOCATION ZONE DERM: LOCATION ZONE: LEG

## 2020-05-06 NOTE — PROCEDURE: ADDITIONAL NOTES
Detail Level: Simple
Additional Notes: Patient states biopsy “it does not bother me” \\nProvided Hibiclens cleanser to apply to the biopsy site until healed. Call for any problems or concerns.

## 2020-05-12 ENCOUNTER — NON-PROVIDER VISIT (OUTPATIENT)
Dept: ENDOCRINOLOGY | Facility: MEDICAL CENTER | Age: 70
End: 2020-05-12
Payer: MEDICARE

## 2020-05-12 PROCEDURE — 96372 THER/PROPH/DIAG INJ SC/IM: CPT | Performed by: INTERNAL MEDICINE

## 2020-05-12 RX ADMIN — TESTOSTERONE CYPIONATE 226 MG: 200 INJECTION, SOLUTION INTRAMUSCULAR at 13:49

## 2020-05-26 ENCOUNTER — NON-PROVIDER VISIT (OUTPATIENT)
Dept: ENDOCRINOLOGY | Facility: MEDICAL CENTER | Age: 70
End: 2020-05-26
Payer: MEDICARE

## 2020-05-26 PROCEDURE — 96372 THER/PROPH/DIAG INJ SC/IM: CPT | Performed by: INTERNAL MEDICINE

## 2020-05-26 RX ADMIN — TESTOSTERONE CYPIONATE 226 MG: 200 INJECTION, SOLUTION INTRAMUSCULAR at 16:30

## 2020-06-09 ENCOUNTER — NON-PROVIDER VISIT (OUTPATIENT)
Dept: ENDOCRINOLOGY | Facility: MEDICAL CENTER | Age: 70
End: 2020-06-09
Payer: MEDICARE

## 2020-06-09 PROCEDURE — 96372 THER/PROPH/DIAG INJ SC/IM: CPT | Performed by: INTERNAL MEDICINE

## 2020-06-09 RX ADMIN — TESTOSTERONE CYPIONATE 226 MG: 200 INJECTION, SOLUTION INTRAMUSCULAR at 15:16

## 2020-06-09 NOTE — NON-PROVIDER
Jomar Thomas is a 69 y.o. male here for a non-provider visit for Testosterone injection.    Reason for injection: low Testosterone  Order in MAR?: Yes  Patient supplied?:No  Minimum interval has been met for this injection (per MAR order): Yes    Order and dose verified by: MELQUAIDES  Patient tolerated injection and no adverse effects were observed or reported: Yes    # of Administrations remaining in MAR: 2

## 2020-06-23 ENCOUNTER — NON-PROVIDER VISIT (OUTPATIENT)
Dept: ENDOCRINOLOGY | Facility: MEDICAL CENTER | Age: 70
End: 2020-06-23
Payer: MEDICARE

## 2020-06-23 DIAGNOSIS — E29.1 HYPOGONADISM MALE: ICD-10-CM

## 2020-06-23 PROCEDURE — 96372 THER/PROPH/DIAG INJ SC/IM: CPT | Performed by: INTERNAL MEDICINE

## 2020-06-23 RX ADMIN — TESTOSTERONE CYPIONATE 226 MG: 200 INJECTION, SOLUTION INTRAMUSCULAR at 15:25

## 2020-06-23 NOTE — NON-PROVIDER
Jomar Thomas is a 70 y.o. male here for a non-provider visit for Testosterone injection.    Reason for injection: hypogonadism  Order in MAR?: Yes  Patient supplied?:No  Minimum interval has been met for this injection (per MAR order): Yes    Order and dose verified by: MELQUIADES  Patient tolerated injection and no adverse effects were observed or reported: Yes    # of Administrations remaining in MAR: 2

## 2020-07-01 ENCOUNTER — APPOINTMENT (RX ONLY)
Dept: URBAN - METROPOLITAN AREA CLINIC 36 | Facility: CLINIC | Age: 70
Setting detail: DERMATOLOGY
End: 2020-07-01

## 2020-07-01 PROBLEM — C44.319 BASAL CELL CARCINOMA OF SKIN OF OTHER PARTS OF FACE: Status: ACTIVE | Noted: 2020-07-01

## 2020-07-01 PROCEDURE — ? OBSERVATION

## 2020-07-01 PROCEDURE — 99212 OFFICE O/P EST SF 10 MIN: CPT

## 2020-07-07 ENCOUNTER — NON-PROVIDER VISIT (OUTPATIENT)
Dept: ENDOCRINOLOGY | Facility: MEDICAL CENTER | Age: 70
End: 2020-07-07
Attending: INTERNAL MEDICINE
Payer: MEDICARE

## 2020-07-07 DIAGNOSIS — R53.83 OTHER FATIGUE: ICD-10-CM

## 2020-07-07 PROCEDURE — 96372 THER/PROPH/DIAG INJ SC/IM: CPT | Performed by: INTERNAL MEDICINE

## 2020-07-07 RX ADMIN — TESTOSTERONE CYPIONATE 226 MG: 200 INJECTION, SOLUTION INTRAMUSCULAR at 16:55

## 2020-07-07 NOTE — NON-PROVIDER
Jomar Thomas is a 70 y.o. male here for a non-provider visit for Testosterone injection.    Reason for injection: low Testosterone  Order in MAR?: Yes  Patient supplied?:No  Minimum interval has been met for this injection (per MAR order): Yes    Order and dose verified by: MELQUIADES  Patient tolerated injection and no adverse effects were observed or reported: Yes    # of Administrations remaining in MAR: 2

## 2020-08-04 ENCOUNTER — NON-PROVIDER VISIT (OUTPATIENT)
Dept: ENDOCRINOLOGY | Facility: MEDICAL CENTER | Age: 70
End: 2020-08-04
Attending: INTERNAL MEDICINE
Payer: MEDICARE

## 2020-08-04 DIAGNOSIS — R53.83 OTHER FATIGUE: ICD-10-CM

## 2020-08-04 DIAGNOSIS — E29.1 HYPOGONADISM MALE: ICD-10-CM

## 2020-08-04 DIAGNOSIS — E78.5 HYPERLIPIDEMIA, UNSPECIFIED HYPERLIPIDEMIA TYPE: ICD-10-CM

## 2020-08-04 DIAGNOSIS — E55.9 VITAMIN D DEFICIENCY: ICD-10-CM

## 2020-08-04 PROCEDURE — 96372 THER/PROPH/DIAG INJ SC/IM: CPT | Performed by: INTERNAL MEDICINE

## 2020-08-04 RX ADMIN — TESTOSTERONE CYPIONATE 226 MG: 200 INJECTION, SOLUTION INTRAMUSCULAR at 15:56

## 2020-08-04 NOTE — NON-PROVIDER
Jomar Thomas is a 70 y.o. male here for a non-provider visit for Testosterone injection.    Reason for injection: lowTestosterone  Order in MAR?: Yes  Patient supplied?:No  Minimum interval has been met for this injection (per MAR order): Yes    Order and dose verified by: MELQUIADES  Patient tolerated injection and no adverse effects were observed or reported: Yes    # of Administrations remaining in MAR: 3

## 2020-08-05 ENCOUNTER — PATIENT MESSAGE (OUTPATIENT)
Dept: ENDOCRINOLOGY | Facility: MEDICAL CENTER | Age: 70
End: 2020-08-05

## 2020-08-12 ENCOUNTER — HOSPITAL ENCOUNTER (OUTPATIENT)
Dept: LAB | Facility: MEDICAL CENTER | Age: 70
End: 2020-08-12
Attending: INTERNAL MEDICINE
Payer: MEDICARE

## 2020-08-12 DIAGNOSIS — E78.5 HYPERLIPIDEMIA, UNSPECIFIED HYPERLIPIDEMIA TYPE: ICD-10-CM

## 2020-08-12 DIAGNOSIS — E55.9 VITAMIN D DEFICIENCY: ICD-10-CM

## 2020-08-12 DIAGNOSIS — E29.1 HYPOGONADISM MALE: ICD-10-CM

## 2020-08-12 LAB
25(OH)D3 SERPL-MCNC: 71 NG/ML (ref 30–100)
ANION GAP SERPL CALC-SCNC: 12 MMOL/L (ref 7–16)
BUN SERPL-MCNC: 13 MG/DL (ref 8–22)
CALCIUM SERPL-MCNC: 9.8 MG/DL (ref 8.5–10.5)
CHLORIDE SERPL-SCNC: 95 MMOL/L (ref 96–112)
CHOLEST SERPL-MCNC: 182 MG/DL (ref 100–199)
CO2 SERPL-SCNC: 27 MMOL/L (ref 20–33)
CREAT SERPL-MCNC: 0.93 MG/DL (ref 0.5–1.4)
ERYTHROCYTE [DISTWIDTH] IN BLOOD BY AUTOMATED COUNT: 56.8 FL (ref 35.9–50)
FASTING STATUS PATIENT QL REPORTED: NORMAL
GLUCOSE SERPL-MCNC: 100 MG/DL (ref 65–99)
HCT VFR BLD AUTO: 44.7 % (ref 42–52)
HDLC SERPL-MCNC: 112 MG/DL
HGB BLD-MCNC: 15.5 G/DL (ref 14–18)
LDLC SERPL CALC-MCNC: 62 MG/DL
MCH RBC QN AUTO: 36.1 PG (ref 27–33)
MCHC RBC AUTO-ENTMCNC: 34.7 G/DL (ref 33.7–35.3)
MCV RBC AUTO: 104.2 FL (ref 81.4–97.8)
PLATELET # BLD AUTO: 185 K/UL (ref 164–446)
PMV BLD AUTO: 9.2 FL (ref 9–12.9)
POTASSIUM SERPL-SCNC: 4.2 MMOL/L (ref 3.6–5.5)
RBC # BLD AUTO: 4.29 M/UL (ref 4.7–6.1)
SODIUM SERPL-SCNC: 134 MMOL/L (ref 135–145)
TESTOST SERPL-MCNC: 888 NG/DL (ref 175–781)
TRIGL SERPL-MCNC: 39 MG/DL (ref 0–149)
WBC # BLD AUTO: 5.7 K/UL (ref 4.8–10.8)

## 2020-08-12 PROCEDURE — 85027 COMPLETE CBC AUTOMATED: CPT

## 2020-08-12 PROCEDURE — 36415 COLL VENOUS BLD VENIPUNCTURE: CPT

## 2020-08-12 PROCEDURE — 84403 ASSAY OF TOTAL TESTOSTERONE: CPT

## 2020-08-12 PROCEDURE — 80061 LIPID PANEL: CPT

## 2020-08-12 PROCEDURE — 82306 VITAMIN D 25 HYDROXY: CPT

## 2020-08-12 PROCEDURE — 80048 BASIC METABOLIC PNL TOTAL CA: CPT

## 2020-08-12 PROCEDURE — 84270 ASSAY OF SEX HORMONE GLOBUL: CPT

## 2020-08-14 LAB — SHBG SERPL-SCNC: 43 NMOL/L (ref 11–80)

## 2020-08-17 NOTE — PROGRESS NOTES
Endocrinology Clinic Progress Note  PCP: Pratibha Corral M.D.    HPI:  River Connolly is a 70 y.o. old patient who comes in today for review of endocrine problems.  He was in the hospital after hitting head and his sodium had dropped to 120.  His lab work on 8/12/20 show a sodium of 134.       Hypogonadism:  Currently taking Testosterone Cypionate 226 mg IM every 14 days.  Results for RIVER CONNOLLY (MRN [id number]) as of 8/17/2020 07:32   Ref. Range 8/12/2020 08:07   Sex Hormone Bind Globulin Latest Ref Range: 11 - 80 nmol/L 43   Testosterone,Total Latest Ref Range: 175 - 781 ng/dL 888 (H)     Vitamin D Deficiency:  Currently taking Vitamin D 4000 units daily.  Results for RIVER CONNOLLY (MRN [id number]) as of 8/17/2020 07:32   Ref. Range 8/12/2020 08:07   25-Hydroxy   Vitamin D 25 Latest Ref Range: 30 - 100 ng/mL 71     Vitamin B 12 Deficiency:  Currently taking Vitamin B 12 2500 mcg sublingual daily.   Results for RIVER CONNOLLY (MRN [id number]) as of 8/17/2020 07:32   Ref. Range 2/12/2020 08:02   Vitamin B12 -True Cobalamin Latest Ref Range: 211 - 911 pg/mL 1285 (H)     ROS:  Constitutional: fatigue,No unintentional weight loss  Endo: Denies excessive thirst or frequent urination  All other systems were reviewed and were negative.    Past Medical History:  Patient Active Problem List    Diagnosis Date Noted   • Dilated aortic root (HCC) 10/23/2018   • Elevated PSA 08/28/2018   • Intrinsic eczema 08/28/2018   • Peripheral edema 08/28/2018   • Mild concentric left ventricular hypertrophy (LVH) 08/28/2018   • Strain of lumbar region 07/17/2018   • Elevated serum free T4 level 05/22/2018   • Elevated carcinoembryonic antigen (CEA) 02/15/2018   • Chronic seasonal allergic rhinitis due to pollen 02/15/2018   • Age-related cataract of both eyes 02/15/2018   • Gastroesophageal reflux disease without esophagitis 02/15/2018   • Inflammatory bowel disease 02/15/2018   • History of Bell's palsy  "02/15/2018   • DDD (degenerative disc disease), lumbar 02/15/2018   • Elevated C-reactive protein (CRP) 02/15/2018   • Homocysteinemia (HCC) 02/15/2018   • Family history of colon cancer in mother 02/15/2018   • Family history of pancreatic cancer 02/15/2018   • Other fatigue 03/16/2017   • Central serous retinopathy 10/17/2011       Medications:    Current Outpatient Medications:   •  testosterone cypionate (DEPO-TESTOSTERONE) 200 MG/ML Solution injection, 1.25 mL by Intramuscular route every 14 days for 112 days., Disp: 10 mL, Rfl: 0  •  lisinopril (PRINIVIL) 40 MG tablet, Take 40 mg by mouth every day., Disp: , Rfl:   •  amLODIPine (NORVASC) 5 MG Tab, Take 5 mg by mouth every day., Disp: , Rfl:   •  famotidine (PEPCID) 20 MG Tab, Take 20 mg by mouth 2 times a day., Disp: , Rfl:   •  XARELTO 20 MG Tab tablet, , Disp: , Rfl:   •  amitriptyline (ELAVIL) 10 MG Tab, , Disp: , Rfl:   •  propranolol (INDERAL) 10 MG Tab, , Disp: , Rfl:   •  Syringe/Needle, Disp, (SYRINGE 3CC/04IC9-9/2\") 22G X 1-1/2\" 3 ML Misc, For use with Intra-muscular testosterone  injection every 2 weeks., Disp: 12 Each, Rfl: 3  •  cyanocobalamin (VITAMIN B-12) 1000 MCG/ML Solution, 1,000 mcg by Intramuscular route every 30 days., Disp: , Rfl:   •  Cholecalciferol (VITAMIN D) 2000 units Cap, Take 2,000 Units by mouth., Disp: , Rfl:   •  DiphenhydrAMINE HCl (BENADRYL ALLERGY PO), Take  by mouth., Disp: , Rfl:   •  multivitamin (THERAGRAN) Tab, Take 1 Tab by mouth every day., Disp: , Rfl:   •  digoxin (LANOXIN) 125 MCG Tab, , Disp: , Rfl:   •  flecainide (TAMBOCOR) 100 MG Tab, , Disp: , Rfl:     Current Facility-Administered Medications:   •  testosterone cypionate (DEPO-TESTOSTERONE) injection 226 mg, 226 mg, Intramuscular, Q14 DAYS, Jerome Zamarripa M.D., 226 mg at 08/04/20 8276    Labs: Reviewed    Physical Examination:  Vital signs: /58   Pulse 74   Ht 1.753 m (5' 9\")   Wt 76.7 kg (169 lb)   SpO2 96%   BMI 24.96 kg/m²  Body mass index " is 24.96 kg/m².  General: No apparent distress, cooperative  Eyes: No scleral icterus or discharge  ENMT: Normal on external inspection of nose, lips, normal thyroid exam  Neck: No abnormal masses on inspection  Resp: Normal effort, clear to auscultation bilaterally   CVS: Regular rate and rhythm, S1 S2 normal, no murmur   Extremities: No edema  Abdomen: abdominal obesity present  Neuro: Alert and oriented  Skin: No rash  Psych: Normal mood and affect, intact memory and able to make informed decisions    Assessment and Plan:  1. Hypogonadism male  increase  - testosterone cypionate (DEPO-TESTOSTERONE) to  250 MG  Intramuscular route every 14 days for 112 days.     2. Vitamin D deficiency  Continue vit D with food as per HPI    3. Hyperlipidemia, unspecified hyperlipidemia type  Continue statins.    4. High risk medication use  Monitor hb and hct closely.    5. Muscle weakness (generalized)  Stable and improved.    Return in about 3 months (around 11/18/2020).      Thank you for allowing me to participate in the care of this patient.        CC:   Pratibha Corral M.D.    This note was created using voice recognition software (Dragon). The accuracy of the dictation is limited by the abilities of the software. I have reviewed the note prior to signing, however some errors in grammar and context are still possible. If you have any questions related to this note please do not hesitate to contact our office.

## 2020-08-18 ENCOUNTER — OFFICE VISIT (OUTPATIENT)
Dept: ENDOCRINOLOGY | Facility: MEDICAL CENTER | Age: 70
End: 2020-08-18
Attending: INTERNAL MEDICINE
Payer: MEDICARE

## 2020-08-18 VITALS
OXYGEN SATURATION: 96 % | BODY MASS INDEX: 25.03 KG/M2 | HEIGHT: 69 IN | HEART RATE: 74 BPM | DIASTOLIC BLOOD PRESSURE: 58 MMHG | WEIGHT: 169 LBS | SYSTOLIC BLOOD PRESSURE: 100 MMHG

## 2020-08-18 DIAGNOSIS — E55.9 VITAMIN D DEFICIENCY: ICD-10-CM

## 2020-08-18 DIAGNOSIS — M62.81 MUSCLE WEAKNESS (GENERALIZED): ICD-10-CM

## 2020-08-18 DIAGNOSIS — E29.1 HYPOGONADISM MALE: ICD-10-CM

## 2020-08-18 DIAGNOSIS — Z79.899 HIGH RISK MEDICATION USE: ICD-10-CM

## 2020-08-18 DIAGNOSIS — E78.5 HYPERLIPIDEMIA, UNSPECIFIED HYPERLIPIDEMIA TYPE: ICD-10-CM

## 2020-08-18 PROCEDURE — 99214 OFFICE O/P EST MOD 30 MIN: CPT | Performed by: INTERNAL MEDICINE

## 2020-08-18 PROCEDURE — 96372 THER/PROPH/DIAG INJ SC/IM: CPT | Performed by: INTERNAL MEDICINE

## 2020-08-18 PROCEDURE — 99212 OFFICE O/P EST SF 10 MIN: CPT | Performed by: INTERNAL MEDICINE

## 2020-08-18 RX ORDER — TESTOSTERONE CYPIONATE 200 MG/ML
250 INJECTION, SOLUTION INTRAMUSCULAR
Qty: 10 ML | Refills: 0 | Status: SHIPPED | OUTPATIENT
Start: 2020-08-18 | End: 2020-12-08

## 2020-08-18 RX ORDER — DIGOXIN 125 MCG
TABLET ORAL
COMMUNITY
Start: 2020-07-03

## 2020-08-18 RX ORDER — TESTOSTERONE CYPIONATE 200 MG/ML
250 INJECTION, SOLUTION INTRAMUSCULAR
Status: SHIPPED | OUTPATIENT
Start: 2020-09-01

## 2020-08-18 RX ORDER — FLECAINIDE ACETATE 100 MG/1
100 TABLET ORAL 2 TIMES DAILY
COMMUNITY
Start: 2020-07-03

## 2020-09-01 ENCOUNTER — NON-PROVIDER VISIT (OUTPATIENT)
Dept: ENDOCRINOLOGY | Facility: MEDICAL CENTER | Age: 70
End: 2020-09-01
Attending: INTERNAL MEDICINE
Payer: MEDICARE

## 2020-09-01 DIAGNOSIS — R53.83 OTHER FATIGUE: ICD-10-CM

## 2020-09-01 PROCEDURE — 96372 THER/PROPH/DIAG INJ SC/IM: CPT | Performed by: INTERNAL MEDICINE

## 2020-09-01 RX ADMIN — TESTOSTERONE CYPIONATE 250 MG: 200 INJECTION, SOLUTION INTRAMUSCULAR at 16:01

## 2020-09-15 ENCOUNTER — NON-PROVIDER VISIT (OUTPATIENT)
Dept: ENDOCRINOLOGY | Facility: MEDICAL CENTER | Age: 70
End: 2020-09-15
Attending: INTERNAL MEDICINE
Payer: MEDICARE

## 2020-09-15 PROCEDURE — 96372 THER/PROPH/DIAG INJ SC/IM: CPT | Performed by: INTERNAL MEDICINE

## 2020-09-15 RX ADMIN — TESTOSTERONE CYPIONATE 250 MG: 200 INJECTION, SOLUTION INTRAMUSCULAR at 16:02

## 2020-09-29 ENCOUNTER — NON-PROVIDER VISIT (OUTPATIENT)
Dept: ENDOCRINOLOGY | Facility: MEDICAL CENTER | Age: 70
End: 2020-09-29
Attending: INTERNAL MEDICINE
Payer: MEDICARE

## 2020-09-29 PROCEDURE — 96372 THER/PROPH/DIAG INJ SC/IM: CPT | Performed by: INTERNAL MEDICINE

## 2020-09-29 RX ADMIN — TESTOSTERONE CYPIONATE 250 MG: 200 INJECTION, SOLUTION INTRAMUSCULAR at 15:44

## 2020-10-13 ENCOUNTER — NON-PROVIDER VISIT (OUTPATIENT)
Dept: ENDOCRINOLOGY | Facility: MEDICAL CENTER | Age: 70
End: 2020-10-13
Attending: INTERNAL MEDICINE
Payer: MEDICARE

## 2020-10-13 PROCEDURE — 96372 THER/PROPH/DIAG INJ SC/IM: CPT | Performed by: INTERNAL MEDICINE

## 2020-10-13 RX ADMIN — TESTOSTERONE CYPIONATE 250 MG: 200 INJECTION, SOLUTION INTRAMUSCULAR at 15:41

## 2020-10-27 ENCOUNTER — OFFICE VISIT (OUTPATIENT)
Dept: ENDOCRINOLOGY | Facility: MEDICAL CENTER | Age: 70
End: 2020-10-27
Attending: INTERNAL MEDICINE
Payer: MEDICARE

## 2020-10-27 DIAGNOSIS — E29.1 HYPOGONADISM MALE: ICD-10-CM

## 2020-10-27 PROCEDURE — 96372 THER/PROPH/DIAG INJ SC/IM: CPT | Performed by: INTERNAL MEDICINE

## 2020-10-27 PROCEDURE — 99211 OFF/OP EST MAY X REQ PHY/QHP: CPT | Performed by: INTERNAL MEDICINE

## 2020-10-27 RX ADMIN — TESTOSTERONE CYPIONATE 250 MG: 200 INJECTION, SOLUTION INTRAMUSCULAR at 16:18

## 2020-11-10 ENCOUNTER — NON-PROVIDER VISIT (OUTPATIENT)
Dept: ENDOCRINOLOGY | Facility: MEDICAL CENTER | Age: 70
End: 2020-11-10
Attending: INTERNAL MEDICINE
Payer: MEDICARE

## 2020-11-10 PROCEDURE — 96372 THER/PROPH/DIAG INJ SC/IM: CPT | Performed by: INTERNAL MEDICINE

## 2020-11-10 PROCEDURE — 700111 HCHG RX REV CODE 636 W/ 250 OVERRIDE (IP): Performed by: INTERNAL MEDICINE

## 2020-11-10 RX ADMIN — TESTOSTERONE CYPIONATE 250 MG: 200 INJECTION, SOLUTION INTRAMUSCULAR at 15:45

## 2020-11-10 NOTE — NON-PROVIDER
Jomar Thomas is a 70 y.o. male here for a non-provider visit for testosterone injection.    Reason for injection: low testosterone  Order in MAR?: Yes  Patient supplied?:No  Minimum interval has been met for this injection (per MAR order): Yes    Order and dose verified by: SS  Patient tolerated injection and no adverse effects were observed or reported: Yes    # of Administrations remaining in MAR: 2

## 2020-12-02 ENCOUNTER — HOSPITAL ENCOUNTER (OUTPATIENT)
Dept: LAB | Facility: MEDICAL CENTER | Age: 70
End: 2020-12-02
Attending: INTERNAL MEDICINE
Payer: MEDICARE

## 2020-12-02 DIAGNOSIS — E55.9 VITAMIN D DEFICIENCY: ICD-10-CM

## 2020-12-02 DIAGNOSIS — Z79.899 HIGH RISK MEDICATION USE: ICD-10-CM

## 2020-12-02 DIAGNOSIS — E29.1 HYPOGONADISM MALE: ICD-10-CM

## 2020-12-02 LAB
25(OH)D3 SERPL-MCNC: 72 NG/ML (ref 30–100)
ERYTHROCYTE [DISTWIDTH] IN BLOOD BY AUTOMATED COUNT: 51.8 FL (ref 35.9–50)
HCT VFR BLD AUTO: 51.8 % (ref 42–52)
HGB BLD-MCNC: 17.8 G/DL (ref 14–18)
MCH RBC QN AUTO: 35 PG (ref 27–33)
MCHC RBC AUTO-ENTMCNC: 34.4 G/DL (ref 33.7–35.3)
MCV RBC AUTO: 102 FL (ref 81.4–97.8)
PLATELET # BLD AUTO: 148 K/UL (ref 164–446)
PMV BLD AUTO: 9.4 FL (ref 9–12.9)
RBC # BLD AUTO: 5.08 M/UL (ref 4.7–6.1)
TESTOST SERPL-MCNC: 982 NG/DL (ref 175–781)
WBC # BLD AUTO: 7.8 K/UL (ref 4.8–10.8)

## 2020-12-02 PROCEDURE — 36415 COLL VENOUS BLD VENIPUNCTURE: CPT

## 2020-12-02 PROCEDURE — 82306 VITAMIN D 25 HYDROXY: CPT

## 2020-12-02 PROCEDURE — 85027 COMPLETE CBC AUTOMATED: CPT

## 2020-12-02 PROCEDURE — 84403 ASSAY OF TOTAL TESTOSTERONE: CPT

## 2020-12-02 PROCEDURE — 84270 ASSAY OF SEX HORMONE GLOBUL: CPT

## 2020-12-04 LAB — SHBG SERPL-SCNC: 46 NMOL/L (ref 11–80)

## 2020-12-15 ENCOUNTER — NON-PROVIDER VISIT (OUTPATIENT)
Dept: ENDOCRINOLOGY | Facility: MEDICAL CENTER | Age: 70
End: 2020-12-15
Attending: INTERNAL MEDICINE
Payer: MEDICARE

## 2020-12-15 VITALS — HEIGHT: 69 IN | BODY MASS INDEX: 25.03 KG/M2 | WEIGHT: 169 LBS

## 2020-12-15 DIAGNOSIS — E29.1 HYPOGONADISM MALE: ICD-10-CM

## 2020-12-15 DIAGNOSIS — E55.9 VITAMIN D DEFICIENCY: ICD-10-CM

## 2020-12-15 DIAGNOSIS — Z79.899 HIGH RISK MEDICATION USE: ICD-10-CM

## 2020-12-15 RX ORDER — TESTOSTERONE CYPIONATE 200 MG/ML
250 INJECTION, SOLUTION INTRAMUSCULAR
COMMUNITY

## 2020-12-16 NOTE — PROGRESS NOTES
Chief Complaint: Follow up for Hypogonadism     HPI:   Patient was presented for a telehealth consultation via secure and encrypted videoconferencing technology. This encounter was conducted via Zoom . Verbal consent was obtained. Patient's identity was verified.    Jomar Thomas is a 70 y.o. male here for follow up of Hypogonadism   His main risk factors include Diabetes, Obesity, Opioid use, Steroid use.    Since last visit patient reports feeling good.    He remains on Testosterone IM injections 250 mg every 14 days which has been his dose for the past 3 months.   He reports good compliance and denies missing any weekly or biweekly doses.     He currently denies fatigue, depression, loss of libido, erectile dysfunction.    He currently denies decreased stream, hesitancy and sense of incomplete emptying.    His recent labs show good Total Testosterone and Hematocrit.    Patient's medications, allergies, and social histories were reviewed and updated as appropriate.      ROS:       CONS:     No fever, no chills   EYES:     No diplopia, no blurry vision   CV:           No chest pain, no palpitations   PULM:     No SOB, no cough, no hemoptysis.   GI:            No nausea, no vomiting, no diarrhea, no constipation   ENDO:     No polyuria, no polydipsia, no heat intolerance, no cold intolerance       Past Medical History:  Problem List:  2018-10: Dilated aortic root (HCC)  2018-08: Elevated PSA  2018-08: Intrinsic eczema  2018-08: Peripheral edema  2018-08: Mild concentric left ventricular hypertrophy (LVH)  2018-07: Strain of lumbar region  2018-05: Elevated serum free T4 level  2018-02: Elevated carcinoembryonic antigen (CEA)  2018-02: Chronic seasonal allergic rhinitis due to pollen  2018-02: Age-related cataract of both eyes  2018-02: Gastroesophageal reflux disease without esophagitis  2018-02: Inflammatory bowel disease  2018-02: Migraine without aura and without status migrainosus, not    intractable  2018: History of Bell's palsy  2018: DDD (degenerative disc disease), lumbar  2018: Elevated C-reactive protein (CRP)  2018: Homocysteinemia (HCC)  2018: Family history of colon cancer in mother  2018: Family history of pancreatic cancer  2017: 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  2017: Chest tightness or pressure  2017: Other fatigue  2012: Osteoarthritis of hand  2011-10: Central serous retinopathy      Past Surgical History:  Past Surgical History:   Procedure Laterality Date   • GASTROSCOPY  2017    Procedure: GASTROSCOPY-WITH POSSIBLE 45 Martiniquais SAVORY DILATION OVER GUIDEWIRE;  Surgeon: Finn Bernard M.D.;  Location: SUR[facility]ERY [facility];  Service: Gastroenterology   • EGD W/ENDOSCOPIC ULTRASOUND  2017    Procedure: EGD W/ENDOSCOPIC ULTRASOUND- UPPER, RADIAL;  Surgeon: Finn Bernard M.D.;  Location: [facility];  Service: Gastroenterology   • GASTROSCOPY WITH BIOPSY  2017    Procedure: GASTROSCOPY WITH BIOPSY- SIDE VIEWING SCOPE BIOPSIES OF AMPULLA;  Surgeon: Finn Bernard M.D.;  Location: [facility];  Service: Gastroenterology   • TOE ARTHROPLASTY  2013    Performed by ARAVIND HollandPSHIREEN at [facility] (RIGHT great toe)   • UMBILICAL HERNIA REPAIR     • OTHER Right 1969    Tendon repair RIGHT index finger   • CERVICAL LAMINECTOMY POSTERIOR      C5 - 1984   • TONSILLECTOMY AND ADENOIDECTOMY          Allergies:  Seasonal     Social History:  Social History     Tobacco Use   • Smoking status: Former Smoker     Packs/day: 1.00     Years: 20.00     Pack years: 20.00     Types: Cigarettes     Quit date: 2/15/2001     Years since quittin.8   • Smokeless tobacco: Never Used   Substance Use Topics   • Alcohol use: Yes     Alcohol/week: 0.6 oz     Types: 1 Glasses of wine per week     Comment: weekly   • Drug use: No        Family History:   family history includes Cancer in  "his brother and mother; Stroke in his father.        PHYSICAL EXAM:   Vital signs: Ht 1.753 m (5' 9\")   Wt 76.7 kg (169 lb)   BMI 24.96 kg/m²     This was a telehealth visit.      Labs:  Lab Results   Component Value Date/Time    WBC 7.8 12/02/2020 08:39 AM    RBC 5.08 12/02/2020 08:39 AM    HEMOGLOBIN 17.8 12/02/2020 08:39 AM    .0 (H) 12/02/2020 08:39 AM    MCH 35.0 (H) 12/02/2020 08:39 AM    MCHC 34.4 12/02/2020 08:39 AM    RDW 51.8 (H) 12/02/2020 08:39 AM    MPV 9.4 12/02/2020 08:39 AM       Lab Results   Component Value Date/Time    SODIUM 134 (L) 08/12/2020 08:07 AM    POTASSIUM 4.2 08/12/2020 08:07 AM    CHLORIDE 95 (L) 08/12/2020 08:07 AM    CO2 27 08/12/2020 08:07 AM    ANION 12.0 08/12/2020 08:07 AM    GLUCOSE 100 (H) 08/12/2020 08:07 AM    BUN 13 08/12/2020 08:07 AM    CREATININE 0.93 08/12/2020 08:07 AM    CALCIUM 9.8 08/12/2020 08:07 AM    ASTSGOT 30 07/27/2018 09:13 AM    ALTSGPT 23 07/27/2018 09:13 AM    TBILIRUBIN 0.6 07/27/2018 09:13 AM    ALBUMIN 4.2 07/27/2018 09:13 AM    TOTPROTEIN 6.9 07/27/2018 09:13 AM    GLOBULIN 2.7 07/27/2018 09:13 AM    AGRATIO 1.6 07/27/2018 09:13 AM       No results found for: LH    Lab Results   Component Value Date/Time    FSH 13.8 (H) 11/30/2018 0910       Lab Results   Component Value Date/Time    TESTOSTERONE 982 (H) 12/02/2020 0839       Imaging:    ASSESSMENT/PLAN:      1. Hypogonadism male  Patient has history of hypogonadism and currently maintained on 250 mg IM injection of testosterone every 14 days.  Recent total testosterone level is elevated at 982, and sex hormone binding globulin is at 46; calculated free testosterone is at 19.4.  Did  patient that because his total testosterone was elevated he is on a higher than necessary dose of testosterone which could lead to adverse reactions acne, aggressive behavior, increased sexual libido, headaches, and heart/liver problems.  We suggested going back down on testosterone to 200 mg q. 14 days, but " patient says that he was previously on lower doses and did not feel well would like to maintain current dosing strength; patient expressed understanding of increased risk.    -Continue home testosterone regimen  -Return to clinic in 6 months for repeat labs  -We will [facility] to [facility]    2. Vitamin D deficiency  Patient has history of vitamin D deficiency, recent vitamin D level at 72; patient is currently well managed on 4000 units daily.    -Continue home vitamin D supplementation  -Return to clinic in 6 months for repeat labs    3. High risk medication use  Assessment and plan as above in hypogonadism.    -Continue testosterone regimen  -Return to clinic in 6 months for repeat labs      Return in about 6 months (around 6/15/2021) for hypogonadism and repeat labs.      Cristian Lopez MD  PGY1 Internal Medicine  12/15/20    CC:   DANYA Maynard

## 2021-01-06 ENCOUNTER — APPOINTMENT (RX ONLY)
Dept: URBAN - METROPOLITAN AREA CLINIC 36 | Facility: CLINIC | Age: 71
Setting detail: DERMATOLOGY
End: 2021-01-06

## 2021-01-06 PROBLEM — C44.319 BASAL CELL CARCINOMA OF SKIN OF OTHER PARTS OF FACE: Status: ACTIVE | Noted: 2021-01-06

## 2021-01-06 PROCEDURE — 99213 OFFICE O/P EST LOW 20 MIN: CPT

## 2021-01-06 PROCEDURE — ? OBSERVATION

## 2021-01-15 DIAGNOSIS — Z23 NEED FOR VACCINATION: ICD-10-CM

## 2021-02-10 ENCOUNTER — APPOINTMENT (RX ONLY)
Dept: URBAN - METROPOLITAN AREA CLINIC 35 | Facility: CLINIC | Age: 71
Setting detail: DERMATOLOGY
End: 2021-02-10

## 2021-02-10 DIAGNOSIS — D22 MELANOCYTIC NEVI: ICD-10-CM

## 2021-02-10 DIAGNOSIS — L82.1 OTHER SEBORRHEIC KERATOSIS: ICD-10-CM

## 2021-02-10 DIAGNOSIS — D485 NEOPLASM OF UNCERTAIN BEHAVIOR OF SKIN: ICD-10-CM

## 2021-02-10 DIAGNOSIS — L20.89 OTHER ATOPIC DERMATITIS: ICD-10-CM

## 2021-02-10 DIAGNOSIS — L81.4 OTHER MELANIN HYPERPIGMENTATION: ICD-10-CM

## 2021-02-10 DIAGNOSIS — Z71.89 OTHER SPECIFIED COUNSELING: ICD-10-CM

## 2021-02-10 DIAGNOSIS — Z85.828 PERSONAL HISTORY OF OTHER MALIGNANT NEOPLASM OF SKIN: ICD-10-CM

## 2021-02-10 PROBLEM — L20.84 INTRINSIC (ALLERGIC) ECZEMA: Status: ACTIVE | Noted: 2021-02-10

## 2021-02-10 PROBLEM — D22.5 MELANOCYTIC NEVI OF TRUNK: Status: ACTIVE | Noted: 2021-02-10

## 2021-02-10 PROBLEM — D48.5 NEOPLASM OF UNCERTAIN BEHAVIOR OF SKIN: Status: ACTIVE | Noted: 2021-02-10

## 2021-02-10 PROCEDURE — ? COUNSELING

## 2021-02-10 PROCEDURE — 99213 OFFICE O/P EST LOW 20 MIN: CPT

## 2021-02-10 PROCEDURE — ? DEFER

## 2021-02-10 ASSESSMENT — LOCATION DETAILED DESCRIPTION DERM
LOCATION DETAILED: RIGHT INFERIOR CENTRAL MALAR CHEEK
LOCATION DETAILED: RIGHT POSTERIOR SHOULDER
LOCATION DETAILED: RIGHT INFERIOR UPPER BACK
LOCATION DETAILED: EPIGASTRIC SKIN
LOCATION DETAILED: RIGHT MEDIAL ZYGOMA
LOCATION DETAILED: LEFT SUPERIOR UPPER BACK
LOCATION DETAILED: LEFT DORSAL FOOT
LOCATION DETAILED: RIGHT DORSAL FOOT
LOCATION DETAILED: RIGHT CENTRAL ZYGOMA

## 2021-02-10 ASSESSMENT — LOCATION SIMPLE DESCRIPTION DERM
LOCATION SIMPLE: RIGHT CHEEK
LOCATION SIMPLE: RIGHT UPPER BACK
LOCATION SIMPLE: ABDOMEN
LOCATION SIMPLE: RIGHT FOOT
LOCATION SIMPLE: RIGHT ZYGOMA
LOCATION SIMPLE: LEFT FOOT
LOCATION SIMPLE: LEFT UPPER BACK
LOCATION SIMPLE: RIGHT SHOULDER

## 2021-02-10 ASSESSMENT — LOCATION ZONE DERM
LOCATION ZONE: FACE
LOCATION ZONE: FEET
LOCATION ZONE: ARM
LOCATION ZONE: TRUNK

## 2021-02-10 NOTE — PROCEDURE: DEFER
Introduction Text (Please End With A Colon): The following procedure was deferred:
Detail Level: Simple
Procedure To Be Performed At Next Visit: Biopsy by punch method
5

## 2021-02-10 NOTE — PROCEDURE: REASSURANCE
Detail Level: Generalized
Hide Include Location In Plan Question?: No
Detail Level: Simple
H Plasty Text: Given the location of the defect, shape of the defect and the proximity to free margins a H-plasty was deemed most appropriate for repair.  Using a sterile surgical marker, the appropriate advancement arms of the H-plasty were drawn incorporating the defect and placing the expected incisions within the relaxed skin tension lines where possible. The area thus outlined was incised deep to adipose tissue with a #15 scalpel blade. The skin margins were undermined to an appropriate distance in all directions utilizing iris scissors.  The opposing advancement arms were then advanced into place in opposite direction and anchored with interrupted buried subcutaneous sutures.

## 2021-03-24 ENCOUNTER — APPOINTMENT (RX ONLY)
Dept: URBAN - METROPOLITAN AREA CLINIC 35 | Facility: CLINIC | Age: 71
Setting detail: DERMATOLOGY
End: 2021-03-24

## 2021-03-24 DIAGNOSIS — D485 NEOPLASM OF UNCERTAIN BEHAVIOR OF SKIN: ICD-10-CM | Status: RESOLVED

## 2021-03-24 PROBLEM — D48.5 NEOPLASM OF UNCERTAIN BEHAVIOR OF SKIN: Status: ACTIVE | Noted: 2021-03-24

## 2021-03-24 PROCEDURE — ? ADDITIONAL NOTES

## 2021-03-24 ASSESSMENT — LOCATION SIMPLE DESCRIPTION DERM: LOCATION SIMPLE: RIGHT CHEEK

## 2021-03-24 ASSESSMENT — LOCATION DETAILED DESCRIPTION DERM
LOCATION DETAILED: RIGHT SUPERIOR LATERAL MALAR CHEEK
LOCATION DETAILED: RIGHT INFERIOR CENTRAL MALAR CHEEK

## 2021-03-24 ASSESSMENT — LOCATION ZONE DERM: LOCATION ZONE: FACE

## 2021-03-24 NOTE — PROCEDURE: ADDITIONAL NOTES
Render Risk Assessment In Note?: no
Detail Level: Simple
Additional Notes: Lesions appear to have resolved. There are some scattered inflammatory papules around the area but none that are clinically suspicious. Patient advised to call the office any lesions bleed spontaneously or fail to resolve.

## 2021-08-11 ENCOUNTER — APPOINTMENT (RX ONLY)
Dept: URBAN - METROPOLITAN AREA CLINIC 35 | Facility: CLINIC | Age: 71
Setting detail: DERMATOLOGY
End: 2021-08-11

## 2021-08-11 DIAGNOSIS — Z71.89 OTHER SPECIFIED COUNSELING: ICD-10-CM

## 2021-08-11 DIAGNOSIS — L81.4 OTHER MELANIN HYPERPIGMENTATION: ICD-10-CM

## 2021-08-11 DIAGNOSIS — Z85.828 PERSONAL HISTORY OF OTHER MALIGNANT NEOPLASM OF SKIN: ICD-10-CM

## 2021-08-11 DIAGNOSIS — L82.1 OTHER SEBORRHEIC KERATOSIS: ICD-10-CM

## 2021-08-11 DIAGNOSIS — D22 MELANOCYTIC NEVI: ICD-10-CM

## 2021-08-11 PROBLEM — D22.5 MELANOCYTIC NEVI OF TRUNK: Status: ACTIVE | Noted: 2021-08-11

## 2021-08-11 PROCEDURE — 99213 OFFICE O/P EST LOW 20 MIN: CPT

## 2021-08-11 PROCEDURE — ? COUNSELING

## 2021-08-11 ASSESSMENT — LOCATION DETAILED DESCRIPTION DERM
LOCATION DETAILED: RIGHT POSTERIOR SHOULDER
LOCATION DETAILED: EPIGASTRIC SKIN
LOCATION DETAILED: RIGHT MEDIAL ZYGOMA
LOCATION DETAILED: LEFT SUPERIOR UPPER BACK
LOCATION DETAILED: RIGHT INFERIOR UPPER BACK

## 2021-08-11 ASSESSMENT — LOCATION SIMPLE DESCRIPTION DERM
LOCATION SIMPLE: RIGHT ZYGOMA
LOCATION SIMPLE: RIGHT UPPER BACK
LOCATION SIMPLE: ABDOMEN
LOCATION SIMPLE: LEFT UPPER BACK
LOCATION SIMPLE: RIGHT SHOULDER

## 2021-08-11 ASSESSMENT — LOCATION ZONE DERM
LOCATION ZONE: TRUNK
LOCATION ZONE: FACE
LOCATION ZONE: ARM

## 2022-01-18 ENCOUNTER — APPOINTMENT (RX ONLY)
Dept: URBAN - METROPOLITAN AREA CLINIC 35 | Facility: CLINIC | Age: 72
Setting detail: DERMATOLOGY
End: 2022-01-18

## 2022-01-18 DIAGNOSIS — L663 OTHER SPECIFIED DISEASES OF HAIR AND HAIR FOLLICLES: ICD-10-CM

## 2022-01-18 DIAGNOSIS — L82.1 OTHER SEBORRHEIC KERATOSIS: ICD-10-CM

## 2022-01-18 DIAGNOSIS — L738 OTHER SPECIFIED DISEASES OF HAIR AND HAIR FOLLICLES: ICD-10-CM

## 2022-01-18 DIAGNOSIS — D18.0 HEMANGIOMA: ICD-10-CM

## 2022-01-18 DIAGNOSIS — L73.9 FOLLICULAR DISORDER, UNSPECIFIED: ICD-10-CM

## 2022-01-18 PROBLEM — L02.425 FURUNCLE OF RIGHT LOWER LIMB: Status: ACTIVE | Noted: 2022-01-18

## 2022-01-18 PROBLEM — D18.01 HEMANGIOMA OF SKIN AND SUBCUTANEOUS TISSUE: Status: ACTIVE | Noted: 2022-01-18

## 2022-01-18 PROCEDURE — ? COUNSELING

## 2022-01-18 PROCEDURE — 99213 OFFICE O/P EST LOW 20 MIN: CPT

## 2022-01-18 ASSESSMENT — LOCATION DETAILED DESCRIPTION DERM
LOCATION DETAILED: RIGHT ANTERIOR PROXIMAL THIGH
LOCATION DETAILED: LEFT ANTERIOR PROXIMAL THIGH

## 2022-01-18 ASSESSMENT — LOCATION SIMPLE DESCRIPTION DERM
LOCATION SIMPLE: RIGHT THIGH
LOCATION SIMPLE: LEFT THIGH

## 2022-01-18 ASSESSMENT — LOCATION ZONE DERM: LOCATION ZONE: LEG

## 2022-02-16 ENCOUNTER — APPOINTMENT (RX ONLY)
Dept: URBAN - METROPOLITAN AREA CLINIC 35 | Facility: CLINIC | Age: 72
Setting detail: DERMATOLOGY
End: 2022-02-16

## 2022-02-16 DIAGNOSIS — Z71.89 OTHER SPECIFIED COUNSELING: ICD-10-CM

## 2022-02-16 DIAGNOSIS — L82.1 OTHER SEBORRHEIC KERATOSIS: ICD-10-CM

## 2022-02-16 DIAGNOSIS — D22 MELANOCYTIC NEVI: ICD-10-CM

## 2022-02-16 DIAGNOSIS — Z85.828 PERSONAL HISTORY OF OTHER MALIGNANT NEOPLASM OF SKIN: ICD-10-CM

## 2022-02-16 DIAGNOSIS — L81.4 OTHER MELANIN HYPERPIGMENTATION: ICD-10-CM

## 2022-02-16 PROBLEM — D22.5 MELANOCYTIC NEVI OF TRUNK: Status: ACTIVE | Noted: 2022-02-16

## 2022-02-16 PROCEDURE — 99213 OFFICE O/P EST LOW 20 MIN: CPT

## 2022-02-16 PROCEDURE — ? COUNSELING

## 2022-02-16 ASSESSMENT — LOCATION SIMPLE DESCRIPTION DERM
LOCATION SIMPLE: LEFT UPPER BACK
LOCATION SIMPLE: RIGHT ZYGOMA
LOCATION SIMPLE: RIGHT SHOULDER
LOCATION SIMPLE: ABDOMEN
LOCATION SIMPLE: RIGHT UPPER BACK

## 2022-02-16 ASSESSMENT — LOCATION DETAILED DESCRIPTION DERM
LOCATION DETAILED: EPIGASTRIC SKIN
LOCATION DETAILED: RIGHT INFERIOR UPPER BACK
LOCATION DETAILED: LEFT SUPERIOR UPPER BACK
LOCATION DETAILED: RIGHT MEDIAL ZYGOMA
LOCATION DETAILED: RIGHT POSTERIOR SHOULDER

## 2022-02-16 ASSESSMENT — LOCATION ZONE DERM
LOCATION ZONE: TRUNK
LOCATION ZONE: FACE
LOCATION ZONE: ARM

## 2022-08-17 ENCOUNTER — APPOINTMENT (RX ONLY)
Dept: URBAN - METROPOLITAN AREA CLINIC 35 | Facility: CLINIC | Age: 72
Setting detail: DERMATOLOGY
End: 2022-08-17

## 2022-08-17 DIAGNOSIS — Z71.89 OTHER SPECIFIED COUNSELING: ICD-10-CM

## 2022-08-17 DIAGNOSIS — L82.1 OTHER SEBORRHEIC KERATOSIS: ICD-10-CM

## 2022-08-17 DIAGNOSIS — L70.0 ACNE VULGARIS: ICD-10-CM

## 2022-08-17 DIAGNOSIS — L81.4 OTHER MELANIN HYPERPIGMENTATION: ICD-10-CM

## 2022-08-17 DIAGNOSIS — D22 MELANOCYTIC NEVI: ICD-10-CM

## 2022-08-17 DIAGNOSIS — Z85.828 PERSONAL HISTORY OF OTHER MALIGNANT NEOPLASM OF SKIN: ICD-10-CM

## 2022-08-17 PROBLEM — D22.5 MELANOCYTIC NEVI OF TRUNK: Status: ACTIVE | Noted: 2022-08-17

## 2022-08-17 PROCEDURE — ? TREATMENT REGIMEN

## 2022-08-17 PROCEDURE — ? PRESCRIPTION

## 2022-08-17 PROCEDURE — ? ORDER TESTS

## 2022-08-17 PROCEDURE — ? COUNSELING

## 2022-08-17 PROCEDURE — 99213 OFFICE O/P EST LOW 20 MIN: CPT

## 2022-08-17 RX ORDER — DOXYCYCLINE HYCLATE 100 MG/1
1 CAPSULE, GELATIN COATED ORAL BID
Qty: 60 | Refills: 2 | Status: ERX | COMMUNITY
Start: 2022-08-17

## 2022-08-17 RX ADMIN — DOXYCYCLINE HYCLATE 1: 100 CAPSULE, GELATIN COATED ORAL at 00:00

## 2022-08-17 ASSESSMENT — LOCATION DETAILED DESCRIPTION DERM
LOCATION DETAILED: RIGHT POSTERIOR SHOULDER
LOCATION DETAILED: STERNUM
LOCATION DETAILED: LEFT CENTRAL MALAR CHEEK
LOCATION DETAILED: RIGHT LATERAL MALAR CHEEK
LOCATION DETAILED: EPIGASTRIC SKIN
LOCATION DETAILED: LEFT SUPERIOR UPPER BACK
LOCATION DETAILED: RIGHT INFERIOR UPPER BACK
LOCATION DETAILED: RIGHT MEDIAL ZYGOMA
LOCATION DETAILED: RIGHT CENTRAL ZYGOMA

## 2022-08-17 ASSESSMENT — LOCATION SIMPLE DESCRIPTION DERM
LOCATION SIMPLE: RIGHT ZYGOMA
LOCATION SIMPLE: LEFT UPPER BACK
LOCATION SIMPLE: RIGHT CHEEK
LOCATION SIMPLE: LEFT CHEEK
LOCATION SIMPLE: ABDOMEN
LOCATION SIMPLE: RIGHT SHOULDER
LOCATION SIMPLE: RIGHT UPPER BACK
LOCATION SIMPLE: CHEST

## 2022-08-17 ASSESSMENT — LOCATION ZONE DERM
LOCATION ZONE: ARM
LOCATION ZONE: TRUNK
LOCATION ZONE: FACE

## 2022-08-17 NOTE — PROCEDURE: ORDER TESTS
Expected Date Of Service: 08/17/2022
Performing Laboratory: 0
Billing Type: Third-Party Bill
Bill For Surgical Tray: no

## 2022-08-17 NOTE — PROCEDURE: TREATMENT REGIMEN
Initiate Treatment: Doxycycline 100 mg bid\\nCerave acne wash or panoxyl once daily.
Plan: Follow up in 2 months
Detail Level: Zone

## 2022-08-17 NOTE — PROCEDURE: COUNSELING
Detail Level: Generalized
Bactrim Pregnancy And Lactation Text: This medication is Pregnancy Category D and is known to cause fetal risk.  It is also excreted in breast milk.
Minocycline Counseling: Patient advised regarding possible photosensitivity and discoloration of the teeth, skin, lips, tongue and gums.  Patient instructed to avoid sunlight, if possible.  When exposed to sunlight, patients should wear protective clothing, sunglasses, and sunscreen.  The patient was instructed to call the office immediately if the following severe adverse effects occur:  hearing changes, easy bruising/bleeding, severe headache, or vision changes.  The patient verbalized understanding of the proper use and possible adverse effects of minocycline.  All of the patient's questions and concerns were addressed.
Birth Control Pills Pregnancy And Lactation Text: This medication should be avoided if pregnant and for the first 30 days post-partum.
Detail Level: Simple
Sarecycline Counseling: Patient advised regarding possible photosensitivity and discoloration of the teeth, skin, lips, tongue and gums.  Patient instructed to avoid sunlight, if possible.  When exposed to sunlight, patients should wear protective clothing, sunglasses, and sunscreen.  The patient was instructed to call the office immediately if the following severe adverse effects occur:  hearing changes, easy bruising/bleeding, severe headache, or vision changes.  The patient verbalized understanding of the proper use and possible adverse effects of sarecycline.  All of the patient's questions and concerns were addressed.
Dapsone Pregnancy And Lactation Text: This medication is Pregnancy Category C and is not considered safe during pregnancy or breast feeding.
Include Pregnancy/Lactation Warning?: No
Patient Specific Counseling (Will Not Stick From Patient To Patient): Dirk is on Testosterone but has actually lowered the dose a bit and has been on it for 5 years so he is not sure why he is getting acne now.  We will culture a lesion today.
Doxycycline Counseling:  Patient counseled regarding possible photosensitivity and increased risk for sunburn.  Patient instructed to avoid sunlight, if possible.  When exposed to sunlight, patients should wear protective clothing, sunglasses, and sunscreen.  The patient was instructed to call the office immediately if the following severe adverse effects occur:  hearing changes, easy bruising/bleeding, severe headache, or vision changes.  The patient verbalized understanding of the proper use and possible adverse effects of doxycycline.  All of the patient's questions and concerns were addressed.
Spironolactone Pregnancy And Lactation Text: This medication can cause feminization of the male fetus and should be avoided during pregnancy. The active metabolite is also found in breast milk.
Erythromycin Counseling:  I discussed with the patient the risks of erythromycin including but not limited to GI upset, allergic reaction, drug rash, diarrhea, increase in liver enzymes, and yeast infections.
Tetracycline Pregnancy And Lactation Text: This medication is Pregnancy Category D and not consider safe during pregnancy. It is also excreted in breast milk.
Isotretinoin Counseling: Patient should get monthly blood tests, not donate blood, not drive at night if vision affected, not share medication, and not undergo elective surgery for 6 months after tx completed. Side effects reviewed, pt to contact office should one occur.
High Dose Vitamin A Counseling: Side effects reviewed, pt to contact office should one occur.
Azithromycin Pregnancy And Lactation Text: This medication is considered safe during pregnancy and is also secreted in breast milk.
Bactrim Counseling:  I discussed with the patient the risks of sulfa antibiotics including but not limited to GI upset, allergic reaction, drug rash, diarrhea, dizziness, photosensitivity, and yeast infections.  Rarely, more serious reactions can occur including but not limited to aplastic anemia, agranulocytosis, methemoglobinemia, blood dyscrasias, liver or kidney failure, lung infiltrates or desquamative/blistering drug rashes.
Isotretinoin Pregnancy And Lactation Text: This medication is Pregnancy Category X and is considered extremely dangerous during pregnancy. It is unknown if it is excreted in breast milk.
High Dose Vitamin A Pregnancy And Lactation Text: High dose vitamin A therapy is contraindicated during pregnancy and breast feeding.
Birth Control Pills Counseling: Birth Control Pill Counseling: I discussed with the patient the potential side effects of OCPs including but not limited to increased risk of stroke, heart attack, thrombophlebitis, deep venous thrombosis, hepatic adenomas, breast changes, GI upset, headaches, and depression.  The patient verbalized understanding of the proper use and possible adverse effects of OCPs. All of the patient's questions and concerns were addressed.
Dapsone Counseling: I discussed with the patient the risks of dapsone including but not limited to hemolytic anemia, agranulocytosis, rashes, methemoglobinemia, kidney failure, peripheral neuropathy, headaches, GI upset, and liver toxicity.  Patients who start dapsone require monitoring including baseline LFTs and weekly CBCs for the first month, then every month thereafter.  The patient verbalized understanding of the proper use and possible adverse effects of dapsone.  All of the patient's questions and concerns were addressed.
Doxycycline Pregnancy And Lactation Text: This medication is Pregnancy Category D and not consider safe during pregnancy. It is also excreted in breast milk but is considered safe for shorter treatment courses.
Spironolactone Counseling: Patient advised regarding risks of diarrhea, abdominal pain, hyperkalemia, birth defects (for female patients), liver toxicity and renal toxicity. The patient may need blood work to monitor liver and kidney function and potassium levels while on therapy. The patient verbalized understanding of the proper use and possible adverse effects of spironolactone.  All of the patient's questions and concerns were addressed.
Tetracycline Counseling: Patient counseled regarding possible photosensitivity and increased risk for sunburn.  Patient instructed to avoid sunlight, if possible.  When exposed to sunlight, patients should wear protective clothing, sunglasses, and sunscreen.  The patient was instructed to call the office immediately if the following severe adverse effects occur:  hearing changes, easy bruising/bleeding, severe headache, or vision changes.  The patient verbalized understanding of the proper use and possible adverse effects of tetracycline.  All of the patient's questions and concerns were addressed. Patient understands to avoid pregnancy while on therapy due to potential birth defects.
Erythromycin Pregnancy And Lactation Text: This medication is Pregnancy Category B and is considered safe during pregnancy. It is also excreted in breast milk.
Aklief counseling:  Patient advised to apply a pea-sized amount only at bedtime and wait 30 minutes after washing their face before applying.  If too drying, patient may add a non-comedogenic moisturizer.  The most commonly reported side effects including irritation, redness, scaling, dryness, stinging, burning, itching, and increased risk of sunburn.  The patient verbalized understanding of the proper use and possible adverse effects of retinoids.  All of the patient's questions and concerns were addressed.
Azithromycin Counseling:  I discussed with the patient the risks of azithromycin including but not limited to GI upset, allergic reaction, drug rash, diarrhea, and yeast infections.
Aklief Pregnancy And Lactation Text: It is unknown if this medication is safe to use during pregnancy.  It is unknown if this medication is excreted in breast milk.  Breastfeeding women should use the topical cream on the smallest area of the skin for the shortest time needed while breastfeeding.  Do not apply to nipple and areola.
Tazorac Counseling:  Patient advised that medication is irritating and drying.  Patient may need to apply sparingly and wash off after an hour before eventually leaving it on overnight.  The patient verbalized understanding of the proper use and possible adverse effects of tazorac.  All of the patient's questions and concerns were addressed.
Tazorac Pregnancy And Lactation Text: This medication is not safe during pregnancy. It is unknown if this medication is excreted in breast milk.
Topical Clindamycin Pregnancy And Lactation Text: This medication is Pregnancy Category B and is considered safe during pregnancy. It is unknown if it is excreted in breast milk.
Topical Clindamycin Counseling: Patient counseled that this medication may cause skin irritation or allergic reactions.  In the event of skin irritation, the patient was advised to reduce the amount of the drug applied or use it less frequently.   The patient verbalized understanding of the proper use and possible adverse effects of clindamycin.  All of the patient's questions and concerns were addressed.
Topical Sulfur Applications Pregnancy And Lactation Text: This medication is Pregnancy Category C and has an unknown safety profile during pregnancy. It is unknown if this topical medication is excreted in breast milk.
Topical Sulfur Applications Counseling: Topical Sulfur Counseling: Patient counseled that this medication may cause skin irritation or allergic reactions.  In the event of skin irritation, the patient was advised to reduce the amount of the drug applied or use it less frequently.   The patient verbalized understanding of the proper use and possible adverse effects of topical sulfur application.  All of the patient's questions and concerns were addressed.
Winlevi Counseling:  I discussed with the patient the risks of topical clascoterone including but not limited to erythema, scaling, itching, and stinging. Patient voiced their understanding.
Winlevi Pregnancy And Lactation Text: This medication is considered safe during pregnancy and breastfeeding.
Azelaic Acid Pregnancy And Lactation Text: This medication is considered safe during pregnancy and breast feeding.
Azelaic Acid Counseling: Patient counseled that medicine may cause skin irritation and to avoid applying near the eyes.  In the event of skin irritation, the patient was advised to reduce the amount of the drug applied or use it less frequently.   The patient verbalized understanding of the proper use and possible adverse effects of azelaic acid.  All of the patient's questions and concerns were addressed.
Benzoyl Peroxide Counseling: Patient counseled that medicine may cause skin irritation and bleach clothing.  In the event of skin irritation, the patient was advised to reduce the amount of the drug applied or use it less frequently.   The patient verbalized understanding of the proper use and possible adverse effects of benzoyl peroxide.  All of the patient's questions and concerns were addressed.
Benzoyl Peroxide Pregnancy And Lactation Text: This medication is Pregnancy Category C. It is unknown if benzoyl peroxide is excreted in breast milk.
Topical Retinoid Pregnancy And Lactation Text: This medication is Pregnancy Category C. It is unknown if this medication is excreted in breast milk.
Topical Retinoid counseling:  Patient advised to apply a pea-sized amount only at bedtime and wait 30 minutes after washing their face before applying.  If too drying, patient may add a non-comedogenic moisturizer. The patient verbalized understanding of the proper use and possible adverse effects of retinoids.  All of the patient's questions and concerns were addressed.

## 2022-10-26 ENCOUNTER — APPOINTMENT (RX ONLY)
Dept: URBAN - METROPOLITAN AREA CLINIC 35 | Facility: CLINIC | Age: 72
Setting detail: DERMATOLOGY
End: 2022-10-26

## 2022-10-26 DIAGNOSIS — L738 OTHER SPECIFIED DISEASES OF HAIR AND HAIR FOLLICLES: ICD-10-CM | Status: IMPROVED

## 2022-10-26 DIAGNOSIS — L663 OTHER SPECIFIED DISEASES OF HAIR AND HAIR FOLLICLES: ICD-10-CM | Status: IMPROVED

## 2022-10-26 DIAGNOSIS — L73.9 FOLLICULAR DISORDER, UNSPECIFIED: ICD-10-CM | Status: IMPROVED

## 2022-10-26 PROBLEM — L02.02 FURUNCLE OF FACE: Status: ACTIVE | Noted: 2022-10-26

## 2022-10-26 PROBLEM — L02.223 FURUNCLE OF CHEST WALL: Status: ACTIVE | Noted: 2022-10-26

## 2022-10-26 PROCEDURE — ? TREATMENT REGIMEN

## 2022-10-26 PROCEDURE — 99213 OFFICE O/P EST LOW 20 MIN: CPT

## 2022-10-26 PROCEDURE — ? COUNSELING

## 2022-10-26 ASSESSMENT — LOCATION DETAILED DESCRIPTION DERM
LOCATION DETAILED: RIGHT SUPERIOR LATERAL BUCCAL CHEEK
LOCATION DETAILED: LEFT MEDIAL INFERIOR CHEST
LOCATION DETAILED: LEFT SUPERIOR LATERAL BUCCAL CHEEK

## 2022-10-26 ASSESSMENT — LOCATION ZONE DERM
LOCATION ZONE: FACE
LOCATION ZONE: TRUNK

## 2022-10-26 ASSESSMENT — LOCATION SIMPLE DESCRIPTION DERM
LOCATION SIMPLE: LEFT CHEEK
LOCATION SIMPLE: CHEST
LOCATION SIMPLE: RIGHT CHEEK

## 2022-10-26 NOTE — PROCEDURE: TREATMENT REGIMEN
Plan: Significantly improved after doxycycline course.
Continue Regimen: Benzoyl peroxide wash
Detail Level: Zone

## 2022-10-26 NOTE — HPI: INFECTION (FOLLICULITIS)
How Severe Is It?: moderate
Is This A New Presentation, Or A Follow-Up?: Follow Up Folliculitis
Additional History: Per patient he is improved.
Patent

## 2023-02-15 ENCOUNTER — APPOINTMENT (RX ONLY)
Dept: URBAN - METROPOLITAN AREA CLINIC 35 | Facility: CLINIC | Age: 73
Setting detail: DERMATOLOGY
End: 2023-02-15

## 2023-02-15 DIAGNOSIS — Z85.828 PERSONAL HISTORY OF OTHER MALIGNANT NEOPLASM OF SKIN: ICD-10-CM

## 2023-02-15 DIAGNOSIS — L82.1 OTHER SEBORRHEIC KERATOSIS: ICD-10-CM

## 2023-02-15 DIAGNOSIS — L81.4 OTHER MELANIN HYPERPIGMENTATION: ICD-10-CM

## 2023-02-15 DIAGNOSIS — I87.2 VENOUS INSUFFICIENCY (CHRONIC) (PERIPHERAL): ICD-10-CM

## 2023-02-15 DIAGNOSIS — D22 MELANOCYTIC NEVI: ICD-10-CM

## 2023-02-15 DIAGNOSIS — Z71.89 OTHER SPECIFIED COUNSELING: ICD-10-CM

## 2023-02-15 PROBLEM — D22.5 MELANOCYTIC NEVI OF TRUNK: Status: ACTIVE | Noted: 2023-02-15

## 2023-02-15 PROCEDURE — ? COUNSELING

## 2023-02-15 PROCEDURE — 99213 OFFICE O/P EST LOW 20 MIN: CPT

## 2023-02-15 ASSESSMENT — LOCATION SIMPLE DESCRIPTION DERM
LOCATION SIMPLE: RIGHT SHOULDER
LOCATION SIMPLE: RIGHT UPPER BACK
LOCATION SIMPLE: RIGHT ZYGOMA
LOCATION SIMPLE: LEFT PRETIBIAL REGION
LOCATION SIMPLE: LEFT UPPER BACK
LOCATION SIMPLE: RIGHT PRETIBIAL REGION
LOCATION SIMPLE: ABDOMEN

## 2023-02-15 ASSESSMENT — LOCATION ZONE DERM
LOCATION ZONE: LEG
LOCATION ZONE: TRUNK
LOCATION ZONE: ARM
LOCATION ZONE: FACE

## 2023-02-15 ASSESSMENT — LOCATION DETAILED DESCRIPTION DERM
LOCATION DETAILED: RIGHT DISTAL PRETIBIAL REGION
LOCATION DETAILED: LEFT PROXIMAL PRETIBIAL REGION
LOCATION DETAILED: RIGHT MEDIAL ZYGOMA
LOCATION DETAILED: LEFT SUPERIOR UPPER BACK
LOCATION DETAILED: RIGHT POSTERIOR SHOULDER
LOCATION DETAILED: RIGHT INFERIOR UPPER BACK
LOCATION DETAILED: EPIGASTRIC SKIN

## 2024-02-15 ENCOUNTER — APPOINTMENT (RX ONLY)
Dept: URBAN - METROPOLITAN AREA CLINIC 35 | Facility: CLINIC | Age: 74
Setting detail: DERMATOLOGY
End: 2024-02-15

## 2024-02-15 DIAGNOSIS — Z71.89 OTHER SPECIFIED COUNSELING: ICD-10-CM

## 2024-02-15 DIAGNOSIS — L82.1 OTHER SEBORRHEIC KERATOSIS: ICD-10-CM

## 2024-02-15 DIAGNOSIS — L81.4 OTHER MELANIN HYPERPIGMENTATION: ICD-10-CM

## 2024-02-15 DIAGNOSIS — Z85.828 PERSONAL HISTORY OF OTHER MALIGNANT NEOPLASM OF SKIN: ICD-10-CM

## 2024-02-15 DIAGNOSIS — L20.89 OTHER ATOPIC DERMATITIS: ICD-10-CM | Status: INADEQUATELY CONTROLLED

## 2024-02-15 DIAGNOSIS — D22 MELANOCYTIC NEVI: ICD-10-CM

## 2024-02-15 PROBLEM — D22.5 MELANOCYTIC NEVI OF TRUNK: Status: ACTIVE | Noted: 2024-02-15

## 2024-02-15 PROCEDURE — 99214 OFFICE O/P EST MOD 30 MIN: CPT

## 2024-02-15 PROCEDURE — ? TREATMENT REGIMEN

## 2024-02-15 PROCEDURE — ? COUNSELING

## 2024-02-15 ASSESSMENT — LOCATION DETAILED DESCRIPTION DERM
LOCATION DETAILED: RIGHT DORSAL FOOT
LOCATION DETAILED: LEFT SUPERIOR UPPER BACK
LOCATION DETAILED: RIGHT INFERIOR UPPER BACK
LOCATION DETAILED: RIGHT MEDIAL ZYGOMA
LOCATION DETAILED: RIGHT POSTERIOR SHOULDER
LOCATION DETAILED: EPIGASTRIC SKIN

## 2024-02-15 ASSESSMENT — LOCATION ZONE DERM
LOCATION ZONE: ARM
LOCATION ZONE: FACE
LOCATION ZONE: FEET
LOCATION ZONE: TRUNK

## 2024-02-15 ASSESSMENT — LOCATION SIMPLE DESCRIPTION DERM
LOCATION SIMPLE: LEFT UPPER BACK
LOCATION SIMPLE: RIGHT UPPER BACK
LOCATION SIMPLE: RIGHT ZYGOMA
LOCATION SIMPLE: RIGHT FOOT
LOCATION SIMPLE: RIGHT SHOULDER
LOCATION SIMPLE: ABDOMEN

## 2024-02-15 NOTE — PROCEDURE: TREATMENT REGIMEN
Detail Level: Zone
Initiate Treatment: Clobetasol cream bid for 2 weeks alternating with 2 weeks off( he has this medication at home)

## 2024-02-29 NOTE — OP THERAPY DAILY TREATMENT
"  Outpatient Physical Therapy  DAILY TREATMENT     [facility]  [address]  [place] [address]  Phone:  [phone]  Fax:  [phone]    Date: 08/30/2018    Patient: Jomar Thomas  YOB: 1950  MRN: [id number]     Time Calculation  Start time: 1535  Stop time: 1620 Time Calculation (min): 45 minutes     Chief Complaint: Back Problem (stiffness)    Visit #: 3    SUBJECTIVE:  Pt reports that his back is just stiff, which is something he chronically deals with.     OBJECTIVE:  Current objective measures:           Therapeutic Exercises (CPT 64213):     1. REIL, x5 minutes with MHP    2. Prone LE extensions    3. Supine TrA activation with pelvic tilts, wrapped sheet around abdomen to reduce hernia from protruding    Therapeutic Treatments and Modalities:     1. Mechanical Traction (CPT 09076), 90/70#, 60/20\", +MHP, x15 minutes    2. Manual Therapy (CPT 01262), IASTM lumbar paraspinals, GRIII PA mobs L1-5 and rotational mobs L1-5; manual stretching bilateral hips across all planes, x20 minutes    Time-based treatments/modalities:  Manual therapy minutes (CPT 56274): 20 minutes  Therapeutic exercise minutes (CPT 32105): 10 minutes       Pain rating before treatment: 2  Pain rating after treatment: 2    ASSESSMENT:   Response to treatment: Pt continues to have significant stiffness in his lumbar spine/hips with poor core/glute strength overall. Pt will benefit from continuing skilled PT services to improve core strength and improve awareness of HEP to improve low back mobility/circulation.    PLAN/RECOMMENDATIONS:   Plan for treatment: therapy treatment to continue next visit.  Planned interventions for next visit: continue with current treatment; focus on hip/core stab with techniques to reduce abdominal hernia.      "
Xray Hip 2-3 Views, Right

## 2024-11-07 ENCOUNTER — APPOINTMENT (RX ONLY)
Dept: URBAN - METROPOLITAN AREA CLINIC 35 | Facility: CLINIC | Age: 74
Setting detail: DERMATOLOGY
End: 2024-11-07

## 2024-11-07 DIAGNOSIS — D485 NEOPLASM OF UNCERTAIN BEHAVIOR OF SKIN: ICD-10-CM

## 2024-11-07 PROBLEM — D48.5 NEOPLASM OF UNCERTAIN BEHAVIOR OF SKIN: Status: ACTIVE | Noted: 2024-11-07

## 2024-11-07 PROCEDURE — 11105 PUNCH BX SKIN EA SEP/ADDL: CPT

## 2024-11-07 PROCEDURE — 11104 PUNCH BX SKIN SINGLE LESION: CPT

## 2024-11-07 PROCEDURE — ? BIOPSY BY PUNCH METHOD

## 2024-11-07 ASSESSMENT — LOCATION DETAILED DESCRIPTION DERM
LOCATION DETAILED: RIGHT CENTRAL MALAR CHEEK
LOCATION DETAILED: RIGHT INFERIOR MEDIAL MALAR CHEEK

## 2024-11-07 ASSESSMENT — LOCATION SIMPLE DESCRIPTION DERM: LOCATION SIMPLE: RIGHT CHEEK

## 2024-11-07 ASSESSMENT — LOCATION ZONE DERM: LOCATION ZONE: FACE

## 2024-11-13 ENCOUNTER — APPOINTMENT (RX ONLY)
Dept: URBAN - METROPOLITAN AREA CLINIC 35 | Facility: CLINIC | Age: 74
Setting detail: DERMATOLOGY
End: 2024-11-13

## 2024-11-13 DIAGNOSIS — Z48.02 ENCOUNTER FOR REMOVAL OF SUTURES: ICD-10-CM

## 2024-11-13 PROCEDURE — ? SUTURE REMOVAL (GLOBAL PERIOD)

## 2024-11-13 ASSESSMENT — LOCATION ZONE DERM: LOCATION ZONE: FACE

## 2024-11-13 ASSESSMENT — LOCATION SIMPLE DESCRIPTION DERM: LOCATION SIMPLE: RIGHT CHEEK

## 2024-11-13 ASSESSMENT — LOCATION DETAILED DESCRIPTION DERM
LOCATION DETAILED: RIGHT INFERIOR MEDIAL MALAR CHEEK
LOCATION DETAILED: RIGHT CENTRAL MALAR CHEEK

## 2024-11-13 NOTE — PROCEDURE: SUTURE REMOVAL (GLOBAL PERIOD)
Detail Level: Detailed
Add 99207 Cpt? (Important Note: In 2017 The Use Of 75120 Is Being Tracked By Cms To Determine Future Global Period Reimbursement For Global Periods): no

## 2024-12-10 ENCOUNTER — APPOINTMENT (OUTPATIENT)
Dept: URBAN - METROPOLITAN AREA CLINIC 36 | Facility: CLINIC | Age: 74
Setting detail: DERMATOLOGY
End: 2024-12-10

## 2024-12-10 PROBLEM — C44.319 BASAL CELL CARCINOMA OF SKIN OF OTHER PARTS OF FACE: Status: ACTIVE | Noted: 2024-12-10

## 2024-12-10 PROCEDURE — ? MOHS SURGERY

## 2024-12-10 PROCEDURE — 17312 MOHS ADDL STAGE: CPT

## 2024-12-10 PROCEDURE — ? PRESCRIPTION

## 2024-12-10 PROCEDURE — 17311 MOHS 1 STAGE H/N/HF/G: CPT

## 2024-12-10 PROCEDURE — 13132 CMPLX RPR F/C/C/M/N/AX/G/H/F: CPT

## 2024-12-10 RX ORDER — AMOXICILLIN 500 MG/1
CAPSULE ORAL
Qty: 14 | Refills: 0 | Status: ERX | COMMUNITY
Start: 2024-12-10

## 2024-12-10 RX ORDER — AMOXICILLIN AND CLAVULANATE POTASSIUM 500; 125 MG/1; 1/1
TABLET, FILM COATED ORAL
Qty: 1 | Refills: 0 | Status: ERX | COMMUNITY
Start: 2024-12-10

## 2024-12-10 RX ADMIN — AMOXICILLIN AND CLAVULANATE POTASSIUM: 500; 125 TABLET, FILM COATED ORAL at 00:00

## 2024-12-10 RX ADMIN — AMOXICILLIN: 500 CAPSULE ORAL at 00:00

## 2024-12-10 NOTE — PROCEDURE: MOHS SURGERY
Did You Provide Opioid Counseling: No
Show Date Of Previous Biopsy Variable: Yes
Burow's Graft Text: The defect edges were debeveled with a #15 scalpel blade.  Given the location of the defect, shape of the defect, the proximity to free margins and the presence of a standing cone deformity a Burow's skin graft was deemed most appropriate. The standing cone was removed and this tissue was then trimmed to the shape of the primary defect. The adipose tissue was also removed until only dermis and epidermis were left.  The skin margins of the secondary defect were undermined to an appropriate distance in all directions utilizing iris scissors.  The secondary defect was closed with interrupted buried subcutaneous sutures.  The skin edges were then re-apposed with running  sutures.  The skin graft was then placed in the primary defect and oriented appropriately.
Consent (Spinal Accessory)/Introductory Paragraph: The rationale for Mohs was explained to the patient and consent was obtained. The risks, benefits and alternatives to therapy were discussed in detail. Specifically, the risks of damage to the spinal accessory nerve, infection, scarring, bleeding, prolonged wound healing, incomplete removal, allergy to anesthesia, and recurrence were addressed. Prior to the procedure, the treatment site was clearly identified and confirmed by the patient. All components of Universal Protocol/PAUSE Rule completed.
Is There Documentation In The Chart Showing Discussion Of Changes With Another Physician?: Please Select the Appropriate Response
Special Stains Stage 1 - Results: Base On Clearance Noted Above
Helical Rim Advancement Flap Text: The defect edges were debeveled with a #15 blade scalpel.  Given the location of the defect and the proximity to free margins (helical rim) a double helical rim advancement flap was deemed most appropriate.  Using a sterile surgical marker, the appropriate advancement flaps were drawn incorporating the defect and placing the expected incisions between the helical rim and antihelix where possible.  The area thus outlined was incised through and through with a #15 scalpel blade.  With a skin hook and iris scissors, the flaps were gently and sharply undermined and freed up.
Dermal Autograft Text: The defect edges were debeveled with a #15 scalpel blade.  Given the location of the defect, shape of the defect and the proximity to free margins a dermal autograft was deemed most appropriate.  Using a sterile surgical marker, the primary defect shape was transferred to the donor site. The area thus outlined was incised deep to adipose tissue with a #15 scalpel blade.  The harvested graft was then trimmed of adipose and epidermal tissue until only dermis was left.  The skin graft was then placed in the primary defect and oriented appropriately.
Advancement Flap (Double) Text: The defect edges were debeveled with a #15 scalpel blade.  Given the location of the defect and the proximity to free margins a double advancement flap was deemed most appropriate.  Using a sterile surgical marker, the appropriate advancement flaps were drawn incorporating the defect and placing the expected incisions within the relaxed skin tension lines where possible.    The area thus outlined was incised deep to adipose tissue with a #15 scalpel blade.  The skin margins were undermined to an appropriate distance in all directions utilizing iris scissors.
Spiral Flap Text: The defect edges were debeveled with a #15 scalpel blade.  Given the location of the defect, shape of the defect and the proximity to free margins a spiral flap was deemed most appropriate.  Using a sterile surgical marker, an appropriate rotation flap was drawn incorporating the defect and placing the expected incisions within the relaxed skin tension lines where possible. The area thus outlined was incised deep to adipose tissue with a #15 scalpel blade.  The skin margins were undermined to an appropriate distance in all directions utilizing iris scissors.
Mastoid Interpolation Flap Text: A decision was made to reconstruct the defect utilizing an interpolation axial flap and a staged reconstruction.  A telfa template was made of the defect.  This telfa template was then used to outline the mastoid interpolation flap.  The donor area for the pedicle flap was then injected with anesthesia.  The flap was excised through the skin and subcutaneous tissue down to the layer of the underlying musculature.  The pedicle flap was carefully excised within this deep plane to maintain its blood supply.  The edges of the donor site were undermined.   The donor site was closed in a primary fashion.  The pedicle was then rotated into position and sutured.  Once the tube was sutured into place, adequate blood supply was confirmed with blanching and refill.  The pedicle was then wrapped with xeroform gauze and dressed appropriately with a telfa and gauze bandage to ensure continued blood supply and protect the attached pedicle.
Stage 3: Additional Anesthesia Volume In Cc: 0
Donor Site Anesthesia Type: same as repair anesthesia
Wound Care: Vaseline
Plastic Surgeon Procedure Text (F): After obtaining clear surgical margins the patient was sent to plastics for surgical repair.  The patient understands they will receive post-surgical care and follow-up from the referring physician's office.
Epidermal Closure: running cuticular
Consent 3/Introductory Paragraph: I gave the patient a chance to ask questions they had about the procedure.  Following this I explained the Mohs procedure and consent was obtained. The risks, benefits and alternatives to therapy were discussed in detail. Specifically, the risks of infection, scarring, bleeding, prolonged wound healing, incomplete removal, allergy to anesthesia, nerve injury and recurrence were addressed. Prior to the procedure, the treatment site was clearly identified and confirmed by the patient. All components of Universal Protocol/PAUSE Rule completed.
Manual Repair Warning Statement: We plan on removing the manually selected variable below in favor of our much easier automatic structured text blocks found in the previous tab. We decided to do this to help make the flow better and give you the full power of structured data. Manual selection is never going to be ideal in our platform and I would encourage you to avoid using manual selection from this point on, especially since I will be sunsetting this feature. It is important that you do one of two things with the customized text below. First, you can save all of the text in a word file so you can have it for future reference. Second, transfer the text to the appropriate area in the Library tab. Lastly, if there is a flap or graft type which we do not have you need to let us know right away so I can add it in before the variable is hidden. No need to panic, we plan to give you roughly 6 months to make the change.
Retention Suture Bite Size: 1 mm
Subsequent Stages Histo Method Verbiage: Using a similar technique to that described above, a thin layer of tissue was removed from all areas where tumor was visible on the previous stage.  The tissue was again oriented, mapped, dyed, and processed as above.
Which Eyelid Repair Cpt Are You Using?: 49588
Brow Lift Text: A midfrontal incision was made medially to the defect to allow access to the tissues just superior to the left eyebrow. Following careful dissection inferiorly in a supraperiosteal plane to the level of the left eyebrow, several 3-0 monocryl sutures were used to resuspend the eyebrow orbicularis oculi muscular unit to the superior frontal bone periosteum. This resulted in an appropriate reapproximation of static eyebrow symmetry and correction of the left brow ptosis.
Mid-Level Procedure Text (C): After obtaining clear surgical margins the patient was sent to a mid-level provider for surgical repair.  The patient understands they will receive post-surgical care and follow-up from the mid-level provider.
Skin Substitute Text: The defect edges were debeveled with a #15 scalpel blade.  Given the location of the defect, shape of the defect and the proximity to free margins a skin substitute graft was deemed most appropriate.  The graft material was trimmed to fit the size of the defect. The graft was then placed in the primary defect and oriented appropriately.
Estlander Flap (Lower To Upper Lip) Text: The defect of the lower lip was assessed and measured.  Given the location and size of the defect, an Estlander flap was deemed most appropriate.  Using a sterile surgical marker, an appropriate Estlander flap was measured and drawn on the upper lip. Local anesthesia was then infiltrated. A scalpel was then used to incise the lateral aspect of the flap, through skin, muscle and mucosa, leaving the flap pedicled medially.  The flap was then rotated and positioned to fill the lower lip defect.  The flap was then sutured into place with a three layer technique, closing the orbicularis oris muscle layer with subcutaneous buried sutures, followed by a mucosal layer and an epidermal layer.
Non-Graft Cartilage Fenestration Text: The cartilage was fenestrated with a 2mm punch biopsy to help facilitate healing.
Ftsg Text: The defect edges were debeveled with a #15 scalpel blade.  Given the location of the defect, shape of the defect and the proximity to free margins a full thickness skin graft was deemed most appropriate.  Using a sterile surgical marker, the primary defect shape was transferred to the donor site. The area thus outlined was incised deep to adipose tissue with a #15 scalpel blade.  The harvested graft was then trimmed of adipose tissue until only dermis and epidermis was left.  The skin margins of the secondary defect were undermined to an appropriate distance in all directions utilizing iris scissors.  The secondary defect was closed with interrupted buried subcutaneous sutures.  The skin edges were then re-apposed with running  sutures.  The skin graft was then placed in the primary defect and oriented appropriately.
Transposition Flap Text: The defect edges were debeveled with a #15 scalpel blade.  Given the location of the defect and the proximity to free margins a transposition flap was deemed most appropriate.  Using a sterile surgical marker, an appropriate transposition flap was drawn incorporating the defect.    The area thus outlined was incised deep to adipose tissue with a #15 scalpel blade.  The skin margins were undermined to an appropriate distance in all directions utilizing iris scissors.
Ear Wedge Repair Text: A wedge excision was completed by carrying down an excision through the full thickness of the ear and cartilage with an inward facing Burow's triangle. The wound was then closed in a layered fashion.
Lazy S Complex Repair Preamble Text (Leave Blank If You Do Not Want): Extensive wide undermining was performed.
Hemigard Retention Suture: 0-0 Nylon
Home Suture Removal Text: Patient was provided instructions on removing sutures and will remove their sutures at home.  If they have any questions or difficulties they will call the office.
Lazy S Intermediate Repair Preamble Text (Leave Blank If You Do Not Want): Undermining was performed with blunt dissection.
Mart-1 - Negative Histology Text: MART-1 staining demonstrates a normal density and pattern of melanocytes along the dermal-epidermal junction. The surgical margins are negative for tumor cells.
Graft Donor Site Dermal Sutures (Optional): 5-0 Polysorb
Mucosal Advancement Flap Text: Given the location of the defect, shape of the defect and the proximity to free margins a mucosal advancement flap was deemed most appropriate. Incisions were made with a 15 blade scalpel in the appropriate fashion along the cutaneous vermilion border and the mucosal lip. The remaining actinically damaged mucosal tissue was excised.  The mucosal advancement flap was then elevated to the gingival sulcus with care taken to preserve the neurovascular structures and advanced into the primary defect. Care was taken to ensure that precise realignment of the vermilion border was achieved.
Composite Graft Text: The defect edges were debeveled with a #15 scalpel blade.  Given the location of the defect, shape of the defect, the proximity to free margins and the fact the defect was full thickness a composite graft was deemed most appropriate.  The defect was outline and then transferred to the donor site.  A full thickness graft was then excised from the donor site. The graft was then placed in the primary defect, oriented appropriately and then sutured into place.  The secondary defect was then repaired using a primary closure.
Eyelid Partial Thickness Repair - 92686: The eyelid defect was partial thickness which required a wedge repair of the eyelid. Special care was taken to ensure that the eyelid margin was realligned when placing sutures.
Chonodrocutaneous Helical Advancement Flap Text: The defect edges were debeveled with a #15 scalpel blade.  Given the location of the defect and the proximity to free margins a chondrocutaneous helical advancement flap was deemed most appropriate.  Using a sterile surgical marker, the appropriate advancement flap was drawn incorporating the defect and placing the expected incisions within the relaxed skin tension lines where possible.    The area thus outlined was incised deep to adipose tissue with a #15 scalpel blade.  The skin margins were undermined to an appropriate distance in all directions utilizing iris scissors.
Wound Care (No Sutures): Petrolatum
Burow's Advancement Flap Text: The defect edges were debeveled with a #15 scalpel blade.  Given the location of the defect and the proximity to free margins a Burow's advancement flap was deemed most appropriate.  Using a sterile surgical marker, the appropriate advancement flap was drawn incorporating the defect and placing the expected incisions within the relaxed skin tension lines where possible.    The area thus outlined was incised deep to adipose tissue with a #15 scalpel blade.  The skin margins were undermined to an appropriate distance in all directions utilizing iris scissors.
No Residual Tumor Seen Histology Text: There were no malignant cells seen in the sections examined.
H Plasty Text: Given the location of the defect, shape of the defect and the proximity to free margins a H-plasty was deemed most appropriate for repair.  Using a sterile surgical marker, the appropriate advancement arms of the H-plasty were drawn incorporating the defect and placing the expected incisions within the relaxed skin tension lines where possible. The area thus outlined was incised deep to adipose tissue with a #15 scalpel blade. The skin margins were undermined to an appropriate distance in all directions utilizing iris scissors.  The opposing advancement arms were then advanced into place in opposite direction and anchored with interrupted buried subcutaneous sutures.
Area L Indication Text: Tumors in this location are included in Area L (trunk and extremities).  Mohs surgery is indicated for larger tumors, or tumors with aggressive histologic features, in these anatomic locations.
Helical Rim Text: The closure involved the helical rim.
Eyelid Full Thickness Repair - 52332: The eyelid defect was full thickness which required a wedge repair of the eyelid. Special care was taken to ensure that the eyelid margin was realligned when placing sutures.
Mart-1 - Positive Histology Text: MART-1 staining demonstrates areas of higher density and clustering of melanocytes with Pagetoid spread upwards within the epidermis. The surgical margins are positive for tumor cells.
Double Z Plasty Text: The lesion was extirpated to the level of the fat with a #15 scalpel blade. Given the location of the defect, shape of the defect and the proximity to free margins a double Z-plasty was deemed most appropriate for repair. Using a sterile surgical marker, the appropriate transposition arms of the double Z-plasty were drawn incorporating the defect and placing the expected incisions within the relaxed skin tension lines where possible. The area thus outlined was incised deep to adipose tissue with a #15 scalpel blade. The skin margins were undermined to an appropriate distance in all directions utilizing iris scissors. The opposing transposition arms were then transposed and carried over into place in opposite direction and anchored with interrupted buried subcutaneous sutures.
Presence Of Scar Tissue (For Histology): absent
Otolaryngologist Procedure Text (F): After obtaining clear surgical margins the patient was sent to otolaryngology for surgical repair.  The patient understands they will receive post-surgical care and follow-up from the referring physician's office.
Cheiloplasty (Complex) Text: A decision was made to reconstruct the defect with a  cheiloplasty.  The defect was undermined extensively.  Additional obicularis oris muscle was excised with a 15 blade scalpel.  The defect was converted into a full thickness wedge to facilite a better cosmetic result.  Small vessels were then tied off with 5-0 monocyrl. The obicularis oris, superficial fascia, adipose and dermis were then reapproximated.  After the deeper layers were approximated the epidermis was reapproximated with particular care given to realign the vermilion border.
Peng Advancement Flap Text: The defect edges were debeveled with a #15 scalpel blade.  Given the location of the defect, shape of the defect and the proximity to free margins a Peng advancement flap was deemed most appropriate.  Using a sterile surgical marker, an appropriate advancement flap was drawn incorporating the defect and placing the expected incisions within the relaxed skin tension lines where possible. The area thus outlined was incised deep to adipose tissue with a #15 scalpel blade.  The skin margins were undermined to an appropriate distance in all directions utilizing iris scissors.
Epidermal Sutures: 5-0 Prolene
Graft Cartilage Fenestration Text: The cartilage was fenestrated with a 2mm punch biopsy to help facilitate graft survival and healing.
Closure 2 Information: This tab is for additional flaps and grafts, including complex repair and grafts and complex repair and flaps. You can also specify a different location for the additional defect, if the location is the same you do not need to select a new one. We will insert the automated text for the repair you select below just as we do for solitary flaps and grafts. Please note that at this time if you select a location with a different insurance zone you will need to override the ICD10 and CPT if appropriate.
Provider Procedure Text (C): After obtaining clear surgical margins the defect was repaired by another provider.
Mercedes Flap Text: The defect edges were debeveled with a #15 scalpel blade.  Given the location of the defect, shape of the defect and the proximity to free margins a Mercedes flap was deemed most appropriate.  Using a sterile surgical marker, an appropriate advancement flap was drawn incorporating the defect and placing the expected incisions within the relaxed skin tension lines where possible. The area thus outlined was incised deep to adipose tissue with a #15 scalpel blade.  The skin margins were undermined to an appropriate distance in all directions utilizing iris scissors.
Abbe Flap (Lower To Upper Lip) Text: The defect of the upper lip was assessed and measured.  Given the location and size of the defect, an Abbe flap was deemed most appropriate.  Using a sterile surgical marker, an appropriate Abbe flap was measured and drawn on the lower lip. Local anesthesia was then infiltrated. A scalpel was then used to incise the upper lip through and through the skin, vermilion, muscle and mucosa, leaving the flap pedicled on the opposite side.  The flap was then rotated and transferred to the lower lip defect.  The flap was then sutured into place with a three layer technique, closing the orbicularis oris muscle layer with subcutaneous buried sutures, followed by a mucosal layer and an epidermal layer.
O-Z Plasty Text: The defect edges were debeveled with a #15 scalpel blade.  Given the location of the defect, shape of the defect and the proximity to free margins an O-Z plasty (double transposition flap) was deemed most appropriate.  Using a sterile surgical marker, the appropriate transposition flaps were drawn incorporating the defect and placing the expected incisions within the relaxed skin tension lines where possible.    The area thus outlined was incised deep to adipose tissue with a #15 scalpel blade.  The skin margins were undermined to an appropriate distance in all directions utilizing iris scissors.  Hemostasis was achieved with electrocautery.  The flaps were then transposed into place, one clockwise and the other counterclockwise, and anchored with interrupted buried subcutaneous sutures.
Partial Purse String (Simple) Text: Given the location of the defect and the characteristics of the surrounding skin a simple purse string closure was deemed most appropriate.  Undermining was performed circumfirentially around the surgical defect.  A purse string suture was then placed and tightened. Wound tension only allowed a partial closure of the circular defect.
Melolabial Interpolation Flap Text: A decision was made to reconstruct the defect utilizing an interpolation axial flap and a staged reconstruction.  A telfa template was made of the defect.  This telfa template was then used to outline the melolabial interpolation flap.  The donor area for the pedicle flap was then injected with anesthesia.  The flap was excised through the skin and subcutaneous tissue down to the layer of the underlying musculature.  The pedicle flap was carefully excised within this deep plane to maintain its blood supply.  The edges of the donor site were undermined.   The donor site was closed in a primary fashion.  The pedicle was then rotated into position and sutured.  Once the tube was sutured into place, adequate blood supply was confirmed with blanching and refill.  The pedicle was then wrapped with xeroform gauze and dressed appropriately with a telfa and gauze bandage to ensure continued blood supply and protect the attached pedicle.
Surgeon Performing Repair (Optional): Leona Schafer MD
Paramedian Forehead Flap Text: A decision was made to reconstruct the defect utilizing an interpolation axial flap and a staged reconstruction.  A telfa template was made of the defect.  This telfa template was then used to outline the paramedian forehead pedicle flap.  The donor area for the pedicle flap was then injected with anesthesia.  The flap was excised through the skin and subcutaneous tissue down to the layer of the underlying musculature.  The pedicle flap was carefully excised within this deep plane to maintain its blood supply.  The edges of the donor site were undermined.   The donor site was closed in a primary fashion.  The pedicle was then rotated into position and sutured.  Once the tube was sutured into place, adequate blood supply was confirmed with blanching and refill.  The pedicle was then wrapped with xeroform gauze and dressed appropriately with a telfa and gauze bandage to ensure continued blood supply and protect the attached pedicle.
Tumor Debulked?: curette
Adjacent Tissue Transfer Text: The defect edges were debeveled with a #15 scalpel blade.  Given the location of the defect and the proximity to free margins an adjacent tissue transfer was deemed most appropriate.  Using a sterile surgical marker, an appropriate flap was drawn incorporating the defect and placing the expected incisions within the relaxed skin tension lines where possible.    The area thus outlined was incised deep to adipose tissue with a #15 scalpel blade.  The skin margins were undermined to an appropriate distance in all directions utilizing iris scissors.
Bcc Infiltrative Histology Text: There were numerous aggregates of basaloid cells demonstrating an infiltrative pattern.
Medical Necessity Statement: Based on my medical judgement, Mohs surgery is the most appropriate treatment for this cancer compared to other treatments.
Purse String (Simple) Text: Given the location of the defect and the characteristics of the surrounding skin a purse string closure was deemed most appropriate.  Undermining was performed circumfirentially around the surgical defect.  A purse string suture was then placed and tightened.
Consent (Ear)/Introductory Paragraph: The rationale for Mohs was explained to the patient and consent was obtained. The risks, benefits and alternatives to therapy were discussed in detail. Specifically, the risks of ear deformity, infection, scarring, bleeding, prolonged wound healing, incomplete removal, allergy to anesthesia, nerve injury and recurrence were addressed. Prior to the procedure, the treatment site was clearly identified and confirmed by the patient. All components of Universal Protocol/PAUSE Rule completed.
No Repair - Repaired With Adjacent Surgical Defect Text (Leave Blank If You Do Not Want): After obtaining clear surgical margins the defect was repaired concurrently with another surgical defect which was in close approximation.
Anesthesia Type: 1% lidocaine with epinephrine
Oculoplastic Surgeon Procedure Text (D): After obtaining clear surgical margins the patient was sent to oculoplastics for surgical repair.  The patient understands they will receive post-surgical care and follow-up from the referring physician's office.
O-Z Flap Text: The defect edges were debeveled with a #15 scalpel blade.  Given the location of the defect, shape of the defect and the proximity to free margins an O-Z flap was deemed most appropriate.  Using a sterile surgical marker, an appropriate transposition flap was drawn incorporating the defect and placing the expected incisions within the relaxed skin tension lines where possible. The area thus outlined was incised deep to adipose tissue with a #15 scalpel blade.  The skin margins were undermined to an appropriate distance in all directions utilizing iris scissors.
Width Of Defect Perpendicular To Closure In Cm (Required): 0.7
W Plasty Text: The lesion was extirpated to the level of the fat with a #15 scalpel blade.  Given the location of the defect, shape of the defect and the proximity to free margins a W-plasty was deemed most appropriate for repair.  Using a sterile surgical marker, the appropriate transposition arms of the W-plasty were drawn incorporating the defect and placing the expected incisions within the relaxed skin tension lines where possible.    The area thus outlined was incised deep to adipose tissue with a #15 scalpel blade.  The skin margins were undermined to an appropriate distance in all directions utilizing iris scissors.  The opposing transposition arms were then transposed into place in opposite direction and anchored with interrupted buried subcutaneous sutures.
Nasalis Myocutaneous Flap Text: Using a #15 blade, an incision was made around the donor flap to the level of the nasalis muscle. Wide lateral undermining was then performed in both the subcutaneous plane above the nasalis muscle, and in a submuscular plane just above periosteum. This allowed the formation of a free nasalis muscle axial pedicle which was still attached to the actual cutaneous flap, increasing its mobility and vascular viability. Hemostasis was obtained with pinpoint electrocoagulation. The flap was mobilized into position and the pivotal anchor points positioned and stabilized with buried interrupted sutures. Subcutaneous and dermal tissues were closed in a multilayered fashion with sutures. Tissue redundancies were excised, and the epidermal edges were apposed without significant tension and sutured with sutures.
Stage 1: Number Of Blocks?: 1
Xenograft Text: The defect edges were debeveled with a #15 scalpel blade.  Given the location of the defect, shape of the defect and the proximity to free margins a xenograft was deemed most appropriate.  The graft was then trimmed to fit the size of the defect.  The graft was then placed in the primary defect and oriented appropriately.
Melolabial Transposition Flap Text: The defect edges were debeveled with a #15 scalpel blade.  Given the location of the defect and the proximity to free margins a melolabial flap was deemed most appropriate.  Using a sterile surgical marker, an appropriate melolabial transposition flap was drawn incorporating the defect.    The area thus outlined was incised deep to adipose tissue with a #15 scalpel blade.  The skin margins were undermined to an appropriate distance in all directions utilizing iris scissors.
Split-Thickness Skin Graft Text: The defect edges were debeveled with a #15 scalpel blade.  Given the location of the defect, shape of the defect and the proximity to free margins a split thickness skin graft was deemed most appropriate.  Using a sterile surgical marker, the primary defect shape was transferred to the donor site. The split thickness graft was then harvested.  The skin graft was then placed in the primary defect and oriented appropriately.
Bilobed Flap Text: The defect edges were debeveled with a #15 scalpel blade.  Given the location of the defect and the proximity to free margins a bilobe flap was deemed most appropriate.  Using a sterile surgical marker, an appropriate bilobe flap drawn around the defect.    The area thus outlined was incised deep to adipose tissue with a #15 scalpel blade.  The skin margins were undermined to an appropriate distance in all directions utilizing iris scissors.
Pain Refusal Text: I offered to prescribe pain medication but the patient refused to take this medication.
X Size Of Lesion In Cm (Optional): 0.3
Consent (Temporal Branch)/Introductory Paragraph: The rationale for Mohs was explained to the patient and consent was obtained. The risks, benefits and alternatives to therapy were discussed in detail. Specifically, the risks of damage to the temporal branch of the facial nerve, infection, scarring, bleeding, prolonged wound healing, incomplete removal, allergy to anesthesia, and recurrence were addressed. Prior to the procedure, the treatment site was clearly identified and confirmed by the patient. All components of Universal Protocol/PAUSE Rule completed.
Cheek-To-Nose Interpolation Flap Text: A decision was made to reconstruct the defect utilizing an interpolation axial flap and a staged reconstruction.  A telfa template was made of the defect.  This telfa template was then used to outline the Cheek-To-Nose Interpolation flap.  The donor area for the pedicle flap was then injected with anesthesia.  The flap was excised through the skin and subcutaneous tissue down to the layer of the underlying musculature.  The interpolation flap was carefully excised within this deep plane to maintain its blood supply.  The edges of the donor site were undermined.   The donor site was closed in a primary fashion.  The pedicle was then rotated into position and sutured.  Once the tube was sutured into place, adequate blood supply was confirmed with blanching and refill.  The pedicle was then wrapped with xeroform gauze and dressed appropriately with a telfa and gauze bandage to ensure continued blood supply and protect the attached pedicle.
Muscle Hinge Flap Text: The defect edges were debeveled with a #15 scalpel blade.  Given the size, depth and location of the defect and the proximity to free margins a muscle hinge flap was deemed most appropriate.  Using a sterile surgical marker, an appropriate hinge flap was drawn incorporating the defect. The area thus outlined was incised with a #15 scalpel blade.  The skin margins were undermined to an appropriate distance in all directions utilizing iris scissors.
Zygomaticofacial Flap Text: Given the location of the defect, shape of the defect and the proximity to free margins a zygomaticofacial flap was deemed most appropriate for repair.  Using a sterile surgical marker, the appropriate flap was drawn incorporating the defect and placing the expected incisions within the relaxed skin tension lines where possible. The area thus outlined was incised deep to adipose tissue with a #15 scalpel blade with preservation of a vascular pedicle.  The skin margins were undermined to an appropriate distance in all directions utilizing iris scissors.  The flap was then placed into the defect and anchored with interrupted buried subcutaneous sutures.
Localized Dermabrasion With Wire Brush Text: The patient was draped in routine manner.  Localized dermabrasion using 3 x 17 mm wire brush was performed in routine manner to papillary dermis. This spot dermabrasion is being performed to complete skin cancer reconstruction. It also will eliminate the other sun damaged precancerous cells that are known to be part of the regional effect of a lifetime's worth of sun exposure. This localized dermabrasion is therapeutic and should not be considered cosmetic in any regard.
Complex Repair And Flap Additional Text (Will Appearing After The Standard Complex Repair Text): The complex repair was not sufficient to completely close the primary defect. The remaining additional defect was repaired with the flap mentioned below.
Modified Advancement Flap Text: The defect edges were debeveled with a #15 scalpel blade.  Given the location of the defect, shape of the defect and the proximity to free margins a modified advancement flap was deemed most appropriate.  Using a sterile surgical marker, an appropriate advancement flap was drawn incorporating the defect and placing the expected incisions within the relaxed skin tension lines where possible.    The area thus outlined was incised deep to adipose tissue with a #15 scalpel blade.  The skin margins were undermined to an appropriate distance in all directions utilizing iris scissors.
Mustarde Flap Text: The defect edges were debeveled with a #15 scalpel blade.  Given the size, depth and location of the defect and the proximity to free margins a Mustarde flap was deemed most appropriate.  Using a sterile surgical marker, an appropriate flap was drawn incorporating the defect. The area thus outlined was incised with a #15 scalpel blade.  The skin margins were undermined to an appropriate distance in all directions utilizing iris scissors.
Flip-Flop Flap Text: The defect edges were debeveled with a #15 blade scalpel.  Given the location of the defect and the proximity to free margins a flip-flop flap was deemed most appropriate. Using a sterile surgical marker, the appropriate flap was drawn incorporating the defect and placing the expected incisions between the helical rim and antihelix where possible.  The area thus outlined was incised through and through with a #15 scalpel blade. Following this, the designed flap was carried over into the primary defect and sutured into place.
Number Of Stages: 2
Keystone Flap Text: The defect edges were debeveled with a #15 scalpel blade.  Given the location of the defect, shape of the defect a keystone flap was deemed most appropriate.  Using a sterile surgical marker, an appropriate keystone flap was drawn incorporating the defect, outlining the appropriate donor tissue and placing the expected incisions within the relaxed skin tension lines where possible. The area thus outlined was incised deep to adipose tissue with a #15 scalpel blade.  The skin margins were undermined to an appropriate distance in all directions around the primary defect and laterally outward around the flap utilizing iris scissors.
Trilobed Flap Text: The defect edges were debeveled with a #15 scalpel blade.  Given the location of the defect and the proximity to free margins a trilobed flap was deemed most appropriate.  Using a sterile surgical marker, an appropriate trilobed flap drawn around the defect.    The area thus outlined was incised deep to adipose tissue with a #15 scalpel blade.  The skin margins were undermined to an appropriate distance in all directions utilizing iris scissors.
Consent (Lip)/Introductory Paragraph: The rationale for Mohs was explained to the patient and consent was obtained. The risks, benefits and alternatives to therapy were discussed in detail. Specifically, the risks of lip deformity, changes in the oral aperture, infection, scarring, bleeding, prolonged wound healing, incomplete removal, allergy to anesthesia, nerve injury and recurrence were addressed. Prior to the procedure, the treatment site was clearly identified and confirmed by the patient. All components of Universal Protocol/PAUSE Rule completed.
Same Histology In Subsequent Stages Text: The pattern and morphology of the tumor is as described in the first stage.
Asc Procedure Text (E): After obtaining clear surgical margins the patient was sent to an ASC for surgical repair.  The patient understands they will receive post-surgical care and follow-up from the ASC physician.
Post-Care Instructions: I reviewed with the patient in detail post-care instructions. Patient is not to engage in any heavy lifting, exercise, or swimming for the next 14 days. Should the patient develop any fevers, chills, bleeding, severe pain patient will contact the office immediately.
Consent Type: Consent 1 (Standard)
Mauc Instructions: By selecting yes to the question below the MAUC number will be added into the note.  This will be calculated automatically based on the diagnosis chosen, the size entered, the body zone selected (H,M,L) and the specific indications you chose. You will also have the option to override the Mohs AUC if you disagree with the automatically calculated number and this option is found in the Case Summary tab.
Complex Repair And Graft Additional Text (Will Appearing After The Standard Complex Repair Text): The complex repair was not sufficient to completely close the primary defect. The remaining additional defect was repaired with the graft mentioned below.
Bilateral Helical Rim Advancement Flap Text: The defect edges were debeveled with a #15 blade scalpel.  Given the location of the defect and the proximity to free margins (helical rim) a bilateral helical rim advancement flap was deemed most appropriate.  Using a sterile surgical marker, the appropriate advancement flaps were drawn incorporating the defect and placing the expected incisions between the helical rim and antihelix where possible.  The area thus outlined was incised through and through with a #15 scalpel blade.  With a skin hook and iris scissors, the flaps were gently and sharply undermined and freed up.
Dressing (No Sutures): pressure dressing with telfa
Rectangular Flap Text: The defect edges were debeveled with a #15 scalpel blade. Given the location of the defect and the proximity to free margins a rectangular flap was deemed most appropriate. Using a sterile surgical marker, an appropriate rectangular flap was drawn incorporating the defect. The area thus outlined was incised deep to adipose tissue with a #15 scalpel blade. The skin margins were undermined to an appropriate distance in all directions utilizing iris scissors. Following this, the designed flap was carried over into the primary defect and sutured into place.
O-L Flap Text: The defect edges were debeveled with a #15 scalpel blade.  Given the location of the defect, shape of the defect and the proximity to free margins an O-L flap was deemed most appropriate.  Using a sterile surgical marker, an appropriate advancement flap was drawn incorporating the defect and placing the expected incisions within the relaxed skin tension lines where possible.    The area thus outlined was incised deep to adipose tissue with a #15 scalpel blade.  The skin margins were undermined to an appropriate distance in all directions utilizing iris scissors.
Previous Accession (Optional): [id number]
Undermining Location (Optional): in the superficial subcutaneous fat
Staging Info: By selecting yes to the question above you will include information on AJCC 8 tumor staging in your Mohs note. Information on tumor staging will be automatically added for SCCs on the head and neck. AJCC 8 includes tumor size, tumor depth, perineural involvement and bone invasion.
Initial Size Of Lesion: 0.5
Closure 4 Information: This tab is for additional flaps and grafts above and beyond our usual structured repairs.  Please note if you enter information here it will not currently bill and you will need to add the billing information manually.
Hemigard Intro: Due to skin fragility and wound tension, it was decided to use HEMIGARD adhesive retention suture devices to permit a linear closure. The skin was cleaned and dried for a 6cm distance away from the wound. Excessive hair, if present, was removed to allow for adhesion.
Z Plasty Text: The lesion was extirpated to the level of the fat with a #15 scalpel blade.  Given the location of the defect, shape of the defect and the proximity to free margins a Z-plasty was deemed most appropriate for repair.  Using a sterile surgical marker, the appropriate transposition arms of the Z-plasty were drawn incorporating the defect and placing the expected incisions within the relaxed skin tension lines where possible.    The area thus outlined was incised deep to adipose tissue with a #15 scalpel blade.  The skin margins were undermined to an appropriate distance in all directions utilizing iris scissors.  The opposing transposition arms were then transposed into place in opposite direction and anchored with interrupted buried subcutaneous sutures.
Suturegard Intro: Intraoperative tissue expansion was performed, utilizing the SUTUREGARD device, in order to reduce wound tension.
Advancement-Rotation Flap Text: The defect edges were debeveled with a #15 scalpel blade.  Given the location of the defect, shape of the defect and the proximity to free margins an advancement-rotation flap was deemed most appropriate.  Using a sterile surgical marker, an appropriate flap was drawn incorporating the defect and placing the expected incisions within the relaxed skin tension lines where possible. The area thus outlined was incised deep to adipose tissue with a #15 scalpel blade.  The skin margins were undermined to an appropriate distance in all directions utilizing iris scissors.
Area H Indication Text: Tumors in this location are included in Area H (eyelids, eyebrows, nose, lips, chin, ear, pre-auricular, post-auricular, temple, genitalia, hands, feet, ankles and areola).  Tissue conservation is critical in these anatomic locations.
Vermilion Border Text: The closure involved the vermilion border.
Full Thickness Lip Wedge Repair (Flap) Text: Given the location of the defect and the proximity to free margins a full thickness wedge repair was deemed most appropriate.  Using a sterile surgical marker, the appropriate repair was drawn incorporating the defect and placing the expected incisions perpendicular to the vermilion border.  The vermilion border was also meticulously outlined to ensure appropriate reapproximation during the repair.  The area thus outlined was incised through and through with a #15 scalpel blade.  The muscularis and dermis were reaproximated with deep sutures following hemostasis. Care was taken to realign the vermilion border before proceeding with the superficial closure.  Once the vermilion was realigned the superfical and mucosal closure was finished.
Primary Defect Length In Cm (Final Defect Size - Required For Flaps/Grafts): 0.9
Pinch Graft Text: The defect edges were debeveled with a #15 scalpel blade. Given the location of the defect, shape of the defect and the proximity to free margins a pinch graft was deemed most appropriate. Using a sterile surgical marker, the primary defect shape was transferred to the donor site. The area thus outlined was incised deep to adipose tissue with a #15 scalpel blade.  The harvested graft was then trimmed of adipose tissue until only dermis and epidermis was left. The skin margins of the secondary defect were undermined to an appropriate distance in all directions utilizing iris scissors.  The secondary defect was closed with interrupted buried subcutaneous sutures.  The skin edges were then re-apposed with running  sutures.  The skin graft was then placed in the primary defect and oriented appropriately.
Repair Type: Complex Repair
Intermediate Repair And Flap Additional Text (Will Appearing After The Standard Complex Repair Text): The intermediate repair was not sufficient to completely close the primary defect. The remaining additional defect was repaired with the flap mentioned below.
Repair Anesthesia Method: local infiltration
Unna Boot Text: An Unna boot was placed to help immobilize the limb and facilitate more rapid healing.
Suturegard Body: The suture ends were repeatedly re-tightened and re-clamped to achieve the desired tissue expansion.
Consent 1/Introductory Paragraph: The rationale for Mohs was explained to the patient and consent was obtained. The risks, benefits and alternatives to therapy were discussed in detail. Specifically, the risks of infection, scarring, bleeding, prolonged wound healing, incomplete removal, allergy to anesthesia, nerve injury and recurrence were addressed. Prior to the procedure, the treatment site was clearly identified and confirmed by the patient. All components of Universal Protocol/PAUSE Rule completed.
Graft Donor Site Epidermal Sutures (Optional): 5-0 Surgipro
Partial Purse String (Intermediate) Text: Given the location of the defect and the characteristics of the surrounding skin an intermediate purse string closure was deemed most appropriate.  Undermining was performed circumfirentially around the surgical defect.  A purse string suture was then placed and tightened. Wound tension only allowed a partial closure of the circular defect.
Rhomboid Transposition Flap Text: The defect edges were debeveled with a #15 scalpel blade.  Given the location of the defect and the proximity to free margins a rhomboid transposition flap was deemed most appropriate.  Using a sterile surgical marker, an appropriate rhomboid flap was drawn incorporating the defect.    The area thus outlined was incised deep to adipose tissue with a #15 scalpel blade.  The skin margins were undermined to an appropriate distance in all directions utilizing iris scissors.
Alternatives Discussed Intro (Do Not Add Period): I discussed alternative treatments to Mohs surgery and specifically discussed the risks and benefits of
Eye Protection Verbiage: Before proceeding with the stage, a plastic scleral shield was inserted. The globe was anesthetized with a few drops of 1% lidocaine with 1:100,000 epinephrine. Then, an appropriate sized scleral shield was chosen and coated with lacrilube ointment. The shield was gently inserted and left in place for the duration of each stage. After the stage was completed, the shield was gently removed.
Consent (Nose)/Introductory Paragraph: The rationale for Mohs was explained to the patient and consent was obtained. The risks, benefits and alternatives to therapy were discussed in detail. Specifically, the risks of nasal deformity, changes in the flow of air through the nose, infection, scarring, bleeding, prolonged wound healing, incomplete removal, allergy to anesthesia, nerve injury and recurrence were addressed. Prior to the procedure, the treatment site was clearly identified and confirmed by the patient. All components of Universal Protocol/PAUSE Rule completed.
Consent 2/Introductory Paragraph: Mohs surgery was explained to the patient and consent was obtained. The risks, benefits and alternatives to therapy were discussed in detail. Specifically, the risks of infection, scarring, bleeding, prolonged wound healing, incomplete removal, allergy to anesthesia, nerve injury and recurrence were addressed. Prior to the procedure, the treatment site was clearly identified and confirmed by the patient. All components of Universal Protocol/PAUSE Rule completed.
Estimated Blood Loss (Cc): minimal
Localized Dermabrasion With 15 Blade Text: The patient was draped in routine manner.  Localized dermabrasion using a 15 blade was performed in routine manner to papillary dermis. This spot dermabrasion is being performed to complete skin cancer reconstruction. It also will eliminate the other sun damaged precancerous cells that are known to be part of the regional effect of a lifetime's worth of sun exposure. This localized dermabrasion is therapeutic and should not be considered cosmetic in any regard.
Anesthesia Volume In Cc: 3
Referring Physician (Optional): Jacqueline Oden MD
Orbicularis Oris Muscle Flap Text: The defect edges were debeveled with a #15 scalpel blade.  Given that the defect affected the competency of the oral sphincter an orbicularis oris muscle flap was deemed most appropriate to restore this competency and normal muscle function.  Using a sterile surgical marker, an appropriate flap was drawn incorporating the defect. The area thus outlined was incised with a #15 scalpel blade.
Bilateral Rotation Flap Text: The defect edges were debeveled with a #15 scalpel blade. Given the location of the defect, shape of the defect and the proximity to free margins a bilateral rotation flap was deemed most appropriate. Using a sterile surgical marker, an appropriate rotation flap was drawn incorporating the defect and placing the expected incisions within the relaxed skin tension lines where possible. The area thus outlined was incised deep to adipose tissue with a #15 scalpel blade. The skin margins were undermined to an appropriate distance in all directions utilizing iris scissors. Following this, the designed flap was carried over into the primary defect and sutured into place.
Bcc Histology Text: There were numerous aggregates of basaloid cells.
V-Y Flap Text: The defect edges were debeveled with a #15 scalpel blade.  Given the location of the defect, shape of the defect and the proximity to free margins a V-Y flap was deemed most appropriate.  Using a sterile surgical marker, an appropriate advancement flap was drawn incorporating the defect and placing the expected incisions within the relaxed skin tension lines where possible.    The area thus outlined was incised deep to adipose tissue with a #15 scalpel blade.  The skin margins were undermined to an appropriate distance in all directions utilizing iris scissors.
Advancement Flap (Single) Text: The defect edges were debeveled with a #15 scalpel blade.  Given the location of the defect and the proximity to free margins a single advancement flap was deemed most appropriate.  Using a sterile surgical marker, an appropriate advancement flap was drawn incorporating the defect and placing the expected incisions within the relaxed skin tension lines where possible.    The area thus outlined was incised deep to adipose tissue with a #15 scalpel blade.  The skin margins were undermined to an appropriate distance in all directions utilizing iris scissors.
Mohs Rapid Report Verbiage: The area of clinically evident tumor was marked with skin marking ink and appropriately hatched.  The initial incision was made following the Mohs approach through the skin.  The specimen was taken to the lab, divided into the necessary number of pieces, chromacoded and processed according to the Mohs protocol.  This was repeated in successive stages until a tumor free defect was achieved.
Undermining Type: Entire Wound
Secondary Intention Text (Leave Blank If You Do Not Want): The defect will heal with secondary intention.
Intermediate Repair And Graft Additional Text (Will Appearing After The Standard Complex Repair Text): The intermediate repair was not sufficient to completely close the primary defect. The remaining additional defect was repaired with the graft mentioned below.
Mohs Case Number: [id number]
Epidermal Autograft Text: The defect edges were debeveled with a #15 scalpel blade.  Given the location of the defect, shape of the defect and the proximity to free margins an epidermal autograft was deemed most appropriate.  Using a sterile surgical marker, the primary defect shape was transferred to the donor site. The epidermal graft was then harvested.  The skin graft was then placed in the primary defect and oriented appropriately.
Double O-Z Flap Text: The defect edges were debeveled with a #15 scalpel blade.  Given the location of the defect, shape of the defect and the proximity to free margins a Double O-Z flap was deemed most appropriate.  Using a sterile surgical marker, an appropriate transposition flap was drawn incorporating the defect and placing the expected incisions within the relaxed skin tension lines where possible. The area thus outlined was incised deep to adipose tissue with a #15 scalpel blade.  The skin margins were undermined to an appropriate distance in all directions utilizing iris scissors.
Island Pedicle Flap Text: The defect edges were debeveled with a #15 scalpel blade.  Given the location of the defect, shape of the defect and the proximity to free margins an island pedicle advancement flap was deemed most appropriate.  Using a sterile surgical marker, an appropriate advancement flap was drawn incorporating the defect, outlining the appropriate donor tissue and placing the expected incisions within the relaxed skin tension lines where possible.    The area thus outlined was incised deep to adipose tissue with a #15 scalpel blade.  The skin margins were undermined to an appropriate distance in all directions around the primary defect and laterally outward around the island pedicle utilizing iris scissors.  There was minimal undermining beneath the pedicle flap.
Purse String (Intermediate) Text: Given the location of the defect and the characteristics of the surrounding skin a purse string intermediate closure was deemed most appropriate.  Undermining was performed circumfirentially around the surgical defect.  A purse string suture was then placed and tightened.
O-T Plasty Text: The defect edges were debeveled with a #15 scalpel blade.  Given the location of the defect, shape of the defect and the proximity to free margins an O-T plasty was deemed most appropriate.  Using a sterile surgical marker, an appropriate O-T plasty was drawn incorporating the defect and placing the expected incisions within the relaxed skin tension lines where possible.    The area thus outlined was incised deep to adipose tissue with a #15 scalpel blade.  The skin margins were undermined to an appropriate distance in all directions utilizing iris scissors.
Suture Removal: 8 days
Postop Diagnosis: same
Nasolabial Transposition Flap Text: The defect edges were debeveled with a #15 scalpel blade.  Given the size, depth and location of the defect and the proximity to free margins a nasolabial transposition flap was deemed most appropriate. Using a sterile surgical marker, an appropriate flap was drawn incorporating the defect. The area thus outlined was incised with a #15 scalpel blade. The skin margins were undermined to an appropriate distance in all directions utilizing iris scissors. Following this, the designed flap was carried into the primary defect and sutured into place.
Location Indication Override (Is Already Calculated Based On Selected Body Location): Area M
Information: Selecting Yes will display possible errors in your note based on the variables you have selected. This validation is only offered as a suggestion for you. PLEASE NOTE THAT THE VALIDATION TEXT WILL BE REMOVED WHEN YOU FINALIZE YOUR NOTE. IF YOU WANT TO FAX A PRELIMINARY NOTE YOU WILL NEED TO TOGGLE THIS TO 'NO' IF YOU DO NOT WANT IT IN YOUR FAXED NOTE.
Detail Level: Detailed
Simple / Intermediate / Complex Repair - Final Wound Length In Cm: 2.9
Nostril Rim Text: The closure involved the nostril rim.
Repair Hemostasis (Optional): Pinpoint electrocautery
Double Island Pedicle Flap Text: The defect edges were debeveled with a #15 scalpel blade.  Given the location of the defect, shape of the defect and the proximity to free margins a double island pedicle advancement flap was deemed most appropriate.  Using a sterile surgical marker, an appropriate advancement flap was drawn incorporating the defect, outlining the appropriate donor tissue and placing the expected incisions within the relaxed skin tension lines where possible.    The area thus outlined was incised deep to adipose tissue with a #15 scalpel blade.  The skin margins were undermined to an appropriate distance in all directions around the primary defect and laterally outward around the island pedicle utilizing iris scissors.  There was minimal undermining beneath the pedicle flap.
Epidermal Closure Graft Donor Site (Optional): running
Retention Suture Text: Retention sutures were placed to support the closure and prevent dehiscence.
Rotation Flap Text: The defect edges were debeveled with a #15 scalpel blade.  Given the location of the defect, shape of the defect and the proximity to free margins a rotation flap was deemed most appropriate.  Using a sterile surgical marker, an appropriate rotation flap was drawn incorporating the defect and placing the expected incisions within the relaxed skin tension lines where possible.    The area thus outlined was incised deep to adipose tissue with a #15 scalpel blade.  The skin margins were undermined to an appropriate distance in all directions utilizing iris scissors.
Interpolation Flap Text: A decision was made to reconstruct the defect utilizing an interpolation axial flap and a staged reconstruction.  A telfa template was made of the defect.  This telfa template was then used to outline the interpolation flap.  The donor area for the pedicle flap was then injected with anesthesia.  The flap was excised through the skin and subcutaneous tissue down to the layer of the underlying musculature.  The interpolation flap was carefully excised within this deep plane to maintain its blood supply.  The edges of the donor site were undermined.   The donor site was closed in a primary fashion.  The pedicle was then rotated into position and sutured.  Once the tube was sutured into place, adequate blood supply was confirmed with blanching and refill.  The pedicle was then wrapped with xeroform gauze and dressed appropriately with a telfa and gauze bandage to ensure continued blood supply and protect the attached pedicle.
Hemigard Postcare Instructions: The HEMIGARD strips are to remain completely dry for at least 5-7 days.
Island Pedicle Flap-Requiring Vessel Identification Text: The defect edges were debeveled with a #15 scalpel blade.  Given the location of the defect, shape of the defect and the proximity to free margins an island pedicle advancement flap was deemed most appropriate.  Using a sterile surgical marker, an appropriate advancement flap was drawn, based on the axial vessel mentioned above, incorporating the defect, outlining the appropriate donor tissue and placing the expected incisions within the relaxed skin tension lines where possible.    The area thus outlined was incised deep to adipose tissue with a #15 scalpel blade.  The skin margins were undermined to an appropriate distance in all directions around the primary defect and laterally outward around the island pedicle utilizing iris scissors.  There was minimal undermining beneath the pedicle flap.
Star Wedge Flap Text: The defect edges were debeveled with a #15 scalpel blade.  Given the location of the defect, shape of the defect and the proximity to free margins a star wedge flap was deemed most appropriate.  Using a sterile surgical marker, an appropriate rotation flap was drawn incorporating the defect and placing the expected incisions within the relaxed skin tension lines where possible. The area thus outlined was incised deep to adipose tissue with a #15 scalpel blade.  The skin margins were undermined to an appropriate distance in all directions utilizing iris scissors.
Cheek Interpolation Flap Text: A decision was made to reconstruct the defect utilizing an interpolation axial flap and a staged reconstruction.  A telfa template was made of the defect.  This telfa template was then used to outline the Cheek Interpolation flap.  The donor area for the pedicle flap was then injected with anesthesia.  The flap was excised through the skin and subcutaneous tissue down to the layer of the underlying musculature.  The interpolation flap was carefully excised within this deep plane to maintain its blood supply.  The edges of the donor site were undermined.   The donor site was closed in a primary fashion.  The pedicle was then rotated into position and sutured.  Once the tube was sutured into place, adequate blood supply was confirmed with blanching and refill.  The pedicle was then wrapped with xeroform gauze and dressed appropriately with a telfa and gauze bandage to ensure continued blood supply and protect the attached pedicle.
Additional Anesthesia Volume In Cc: 6
Island Pedicle Flap With Canthal Suspension Text: The defect edges were debeveled with a #15 scalpel blade.  Given the location of the defect, shape of the defect and the proximity to free margins an island pedicle advancement flap was deemed most appropriate.  Using a sterile surgical marker, an appropriate advancement flap was drawn incorporating the defect, outlining the appropriate donor tissue and placing the expected incisions within the relaxed skin tension lines where possible. The area thus outlined was incised deep to adipose tissue with a #15 scalpel blade.  The skin margins were undermined to an appropriate distance in all directions around the primary defect and laterally outward around the island pedicle utilizing iris scissors.  There was minimal undermining beneath the pedicle flap. A suspension suture was placed in the canthal tendon to prevent tension and prevent ectropion.
Bill 59 Modifier?: No - Continue to Bill 79 Modifier
Nasalis-Muscle-Based Myocutaneous Island Pedicle Flap Text: Using a #15 blade, an incision was made around the donor flap to the level of the nasalis muscle. Wide lateral undermining was then performed in both the subcutaneous plane above the nasalis muscle, and in a submuscular plane just above periosteum. This allowed the formation of a free nasalis muscle axial pedicle (based on the angular artery) which was still attached to the actual cutaneous flap, increasing its mobility and vascular viability. Hemostasis was obtained with pinpoint electrocoagulation. The flap was mobilized into position and the pivotal anchor points positioned and stabilized with buried interrupted sutures. Subcutaneous and dermal tissues were closed in a multilayered fashion with sutures. Tissue redundancies were excised, and the epidermal edges were apposed without significant tension and sutured with sutures.
Rhombic Flap Text: The defect edges were debeveled with a #15 scalpel blade.  Given the location of the defect and the proximity to free margins a rhombic flap was deemed most appropriate.  Using a sterile surgical marker, an appropriate rhombic flap was drawn incorporating the defect.    The area thus outlined was incised deep to adipose tissue with a #15 scalpel blade.  The skin margins were undermined to an appropriate distance in all directions utilizing iris scissors.
Tissue Cultured Epidermal Autograft Text: The defect edges were debeveled with a #15 scalpel blade.  Given the location of the defect, shape of the defect and the proximity to free margins a tissue cultured epidermal autograft was deemed most appropriate.  The graft was then trimmed to fit the size of the defect.  The graft was then placed in the primary defect and oriented appropriately.
Staged Advancement Flap Text: The defect edges were debeveled with a #15 scalpel blade.  Given the location of the defect, shape of the defect and the proximity to free margins a staged advancement flap was deemed most appropriate.  Using a sterile surgical marker, an appropriate advancement flap was drawn incorporating the defect and placing the expected incisions within the relaxed skin tension lines where possible. The area thus outlined was incised deep to adipose tissue with a #15 scalpel blade.  The skin margins were undermined to an appropriate distance in all directions utilizing iris scissors.
Alar Island Pedicle Flap Text: The defect edges were debeveled with a #15 scalpel blade.  Given the location of the defect, shape of the defect and the proximity to the alar rim an island pedicle advancement flap was deemed most appropriate.  Using a sterile surgical marker, an appropriate advancement flap was drawn incorporating the defect, outlining the appropriate donor tissue and placing the expected incisions within the nasal ala running parallel to the alar rim. The area thus outlined was incised with a #15 scalpel blade.  The skin margins were undermined minimally to an appropriate distance in all directions around the primary defect and laterally outward around the island pedicle utilizing iris scissors.  There was minimal undermining beneath the pedicle flap.
Dorsal Nasal Flap Text: The defect edges were debeveled with a #15 scalpel blade.  Given the location of the defect and the proximity to free margins a dorsal nasal flap was deemed most appropriate.  Using a sterile surgical marker, an appropriate dorsal nasal flap was drawn around the defect.    The area thus outlined was incised deep to adipose tissue with a #15 scalpel blade.  The skin margins were undermined to an appropriate distance in all directions utilizing iris scissors.
Localized Dermabrasion With Sand Papertext: The patient was draped in routine manner.  Localized dermabrasion using sterile sand paper was performed in routine manner to papillary dermis. This spot dermabrasion is being performed to complete skin cancer reconstruction. It also will eliminate the other sun damaged precancerous cells that are known to be part of the regional effect of a lifetime's worth of sun exposure. This localized dermabrasion is therapeutic and should not be considered cosmetic in any regard.
Mohs Method Verbiage: An incision at a 45 degree angle following the standard Mohs approach was done and the specimen was harvested as a microscopic controlled layer.
Area M Indication Text: Tumors in this location are included in Area M (cheek, forehead, scalp, neck, jawline and pretibial skin).  Mohs surgery is indicated for tumors in these anatomic locations.
Depth Of Tumor Invasion (For Histology): dermis
Tumor Depth: Less than 6mm from granular layer and no invasion beyond the subcutaneous fat
O-T Advancement Flap Text: The defect edges were debeveled with a #15 scalpel blade.  Given the location of the defect, shape of the defect and the proximity to free margins an O-T advancement flap was deemed most appropriate.  Using a sterile surgical marker, an appropriate advancement flap was drawn incorporating the defect and placing the expected incisions within the relaxed skin tension lines where possible.    The area thus outlined was incised deep to adipose tissue with a #15 scalpel blade.  The skin margins were undermined to an appropriate distance in all directions utilizing iris scissors.
Ear Star Wedge Flap Text: The defect edges were debeveled with a #15 blade scalpel.  Given the location of the defect and the proximity to free margins (helical rim) an ear star wedge flap was deemed most appropriate.  Using a sterile surgical marker, the appropriate flap was drawn incorporating the defect and placing the expected incisions between the helical rim and antihelix where possible.  The area thus outlined was incised through and through with a #15 scalpel blade.
Consent (Near Eyelid Margin)/Introductory Paragraph: The rationale for Mohs was explained to the patient and consent was obtained. The risks, benefits and alternatives to therapy were discussed in detail. Specifically, the risks of ectropion or eyelid deformity, infection, scarring, bleeding, prolonged wound healing, incomplete removal, allergy to anesthesia, nerve injury and recurrence were addressed. Prior to the procedure, the treatment site was clearly identified and confirmed by the patient. All components of Universal Protocol/PAUSE Rule completed.
Bi-Rhombic Flap Text: The defect edges were debeveled with a #15 scalpel blade.  Given the location of the defect and the proximity to free margins a bi-rhombic flap was deemed most appropriate.  Using a sterile surgical marker, an appropriate rhombic flap was drawn incorporating the defect. The area thus outlined was incised deep to adipose tissue with a #15 scalpel blade.  The skin margins were undermined to an appropriate distance in all directions utilizing iris scissors.
Cheiloplasty (Less Than 50%) Text: A decision was made to reconstruct the defect with a  cheiloplasty.  The defect was undermined extensively.  Additional obicularis oris muscle was excised with a 15 blade scalpel.  The defect was converted into a full thickness wedge, of less than 50% of the vertical height of the lip, to facilite a better cosmetic result.  Small vessels were then tied off with 5-0 monocyrl. The obicularis oris, superficial fascia, adipose and dermis were then reapproximated.  After the deeper layers were approximated the epidermis was reapproximated with particular care given to realign the vermilion border.
Banner Transposition Flap Text: The defect edges were debeveled with a #15 scalpel blade.  Given the location of the defect and the proximity to free margins a Banner transposition flap was deemed most appropriate.  Using a sterile surgical marker, an appropriate flap drawn around the defect. The area thus outlined was incised deep to adipose tissue with a #15 scalpel blade.  The skin margins were undermined to an appropriate distance in all directions utilizing iris scissors.
Crescentic Advancement Flap Text: The defect edges were debeveled with a #15 scalpel blade.  Given the location of the defect and the proximity to free margins a crescentic advancement flap was deemed most appropriate.  Using a sterile surgical marker, the appropriate advancement flap was drawn incorporating the defect and placing the expected incisions within the relaxed skin tension lines where possible.    The area thus outlined was incised deep to adipose tissue with a #15 scalpel blade.  The skin margins were undermined to an appropriate distance in all directions utilizing iris scissors.
Hemostasis: Electrocautery
Consent (Marginal Mandibular)/Introductory Paragraph: The rationale for Mohs was explained to the patient and consent was obtained. The risks, benefits and alternatives to therapy were discussed in detail. Specifically, the risks of damage to the marginal mandibular branch of the facial nerve, infection, scarring, bleeding, prolonged wound healing, incomplete removal, allergy to anesthesia, and recurrence were addressed. Prior to the procedure, the treatment site was clearly identified and confirmed by the patient. All components of Universal Protocol/PAUSE Rule completed.
Tarsorrhaphy Text: A tarsorrhaphy was performed using Frost sutures.
Double O-Z Plasty Text: The defect edges were debeveled with a #15 scalpel blade.  Given the location of the defect, shape of the defect and the proximity to free margins a Double O-Z plasty (double transposition flap) was deemed most appropriate.  Using a sterile surgical marker, the appropriate transposition flaps were drawn incorporating the defect and placing the expected incisions within the relaxed skin tension lines where possible. The area thus outlined was incised deep to adipose tissue with a #15 scalpel blade.  The skin margins were undermined to an appropriate distance in all directions utilizing iris scissors.  Hemostasis was achieved with electrocautery.  The flaps were then transposed into place, one clockwise and the other counterclockwise, and anchored with interrupted buried subcutaneous sutures.
Hatchet Flap Text: The defect edges were debeveled with a #15 scalpel blade.  Given the location of the defect, shape of the defect and the proximity to free margins a hatchet flap was deemed most appropriate.  Using a sterile surgical marker, an appropriate hatchet flap was drawn incorporating the defect and placing the expected incisions within the relaxed skin tension lines where possible.    The area thus outlined was incised deep to adipose tissue with a #15 scalpel blade.  The skin margins were undermined to an appropriate distance in all directions utilizing iris scissors.
Where Do You Want The Question To Include Opioid Counseling Located?: Case Summary Tab
Surgeon/Pathologist Verbiage (Will Incorporate Name Of Surgeon From Intro If Not Blank): operated in two distinct and integrated capacities as the surgeon and pathologist.
Graft Donor Site Bandage (Optional-Leave Blank If You Don't Want In Note): Aquaplast was fitted to the graft site and sewn into place. A pressure bandage were applied to the donor site and over the aquaplast bolster.
Abbe Flap (Upper To Lower Lip) Text: The defect of the lower lip was assessed and measured.  Given the location and size of the defect, an Abbe flap was deemed most appropriate.  Using a sterile surgical marker, an appropriate Abbe flap was measured and drawn on the upper lip. Local anesthesia was then infiltrated.  A scalpel was then used to incise the upper lip through and through the skin, vermilion, muscle and mucosa, leaving the flap pedicled on the opposite side.  The flap was then rotated and transferred to the lower lip defect.  The flap was then sutured into place with a three layer technique, closing the orbicularis oris muscle layer with subcutaneous buried sutures, followed by a mucosal layer and an epidermal layer.
Bilobed Transposition Flap Text: The defect edges were debeveled with a #15 scalpel blade.  Given the location of the defect and the proximity to free margins a bilobed transposition flap was deemed most appropriate.  Using a sterile surgical marker, an appropriate bilobe flap drawn around the defect.    The area thus outlined was incised deep to adipose tissue with a #15 scalpel blade.  The skin margins were undermined to an appropriate distance in all directions utilizing iris scissors.
Debridement Text: The wound edges were debrided prior to proceeding with the closure to facilitate wound healing.
Posterior Auricular Interpolation Flap Text: A decision was made to reconstruct the defect utilizing an interpolation axial flap and a staged reconstruction.  A telfa template was made of the defect.  This telfa template was then used to outline the posterior auricular interpolation flap.  The donor area for the pedicle flap was then injected with anesthesia.  The flap was excised through the skin and subcutaneous tissue down to the layer of the underlying musculature.  The pedicle flap was carefully excised within this deep plane to maintain its blood supply.  The edges of the donor site were undermined.   The donor site was closed in a primary fashion.  The pedicle was then rotated into position and sutured.  Once the tube was sutured into place, adequate blood supply was confirmed with blanching and refill.  The pedicle was then wrapped with xeroform gauze and dressed appropriately with a telfa and gauze bandage to ensure continued blood supply and protect the attached pedicle.
Wound Check: 6 weeks
Cartilage Graft Text: The defect edges were debeveled with a #15 scalpel blade.  Given the location of the defect, shape of the defect, the fact the defect involved a full thickness cartilage defect a cartilage graft was deemed most appropriate.  An appropriate donor site was identified, cleansed, and anesthetized. The cartilage graft was then harvested and transferred to the recipient site, oriented appropriately and then sutured into place.  The secondary defect was then repaired using a primary closure.
Deep Sutures: 4-0 Polysorb
Consent (Scalp)/Introductory Paragraph: The rationale for Mohs was explained to the patient and consent was obtained. The risks, benefits and alternatives to therapy were discussed in detail. Specifically, the risks of changes in hair growth pattern secondary to repair, infection, scarring, bleeding, prolonged wound healing, incomplete removal, allergy to anesthesia, nerve injury and recurrence were addressed. Prior to the procedure, the treatment site was clearly identified and confirmed by the patient. All components of Universal Protocol/PAUSE Rule completed.
Inflammation Suggestive Of Cancer Camouflage Histology Text: There was a dense lymphocytic infiltrate which prevented adequate histologic evaluation of adjacent structures.
V-Y Plasty Text: The defect edges were debeveled with a #15 scalpel blade.  Given the location of the defect, shape of the defect and the proximity to free margins an V-Y advancement flap was deemed most appropriate.  Using a sterile surgical marker, an appropriate advancement flap was drawn incorporating the defect and placing the expected incisions within the relaxed skin tension lines where possible.    The area thus outlined was incised deep to adipose tissue with a #15 scalpel blade.  The skin margins were undermined to an appropriate distance in all directions utilizing iris scissors.
Nasal Turnover Hinge Flap Text: The defect edges were debeveled with a #15 scalpel blade.  Given the size, depth, location of the defect and the defect being full thickness a nasal turnover hinge flap was deemed most appropriate.  Using a sterile surgical marker, an appropriate hinge flap was drawn incorporating the defect. The area thus outlined was incised with a #15 scalpel blade. The flap was designed to recreate the nasal mucosal lining and the alar rim. The skin margins were undermined to an appropriate distance in all directions utilizing iris scissors.
A-T Advancement Flap Text: The defect edges were debeveled with a #15 scalpel blade.  Given the location of the defect, shape of the defect and the proximity to free margins an A-T advancement flap was deemed most appropriate.  Using a sterile surgical marker, an appropriate advancement flap was drawn incorporating the defect and placing the expected incisions within the relaxed skin tension lines where possible.    The area thus outlined was incised deep to adipose tissue with a #15 scalpel blade.  The skin margins were undermined to an appropriate distance in all directions utilizing iris scissors.
Mohs Histo Method Verbiage: Each section was then chromacoded and processed in the Mohs lab using the Mohs protocol and submitted for frozen section.
Which Instrument Did You Use For Dermabrasion?: Wire Brush

## 2024-12-18 ENCOUNTER — APPOINTMENT (OUTPATIENT)
Dept: URBAN - METROPOLITAN AREA CLINIC 36 | Facility: CLINIC | Age: 74
Setting detail: DERMATOLOGY
End: 2024-12-18

## 2024-12-18 DIAGNOSIS — Z48.02 ENCOUNTER FOR REMOVAL OF SUTURES: ICD-10-CM

## 2024-12-18 PROCEDURE — ? SUTURE REMOVAL (GLOBAL PERIOD)

## 2024-12-18 ASSESSMENT — LOCATION DETAILED DESCRIPTION DERM: LOCATION DETAILED: RIGHT CENTRAL MALAR CHEEK

## 2024-12-18 ASSESSMENT — LOCATION ZONE DERM: LOCATION ZONE: FACE

## 2024-12-18 ASSESSMENT — LOCATION SIMPLE DESCRIPTION DERM: LOCATION SIMPLE: RIGHT CHEEK

## 2024-12-18 NOTE — PROCEDURE: SUTURE REMOVAL (GLOBAL PERIOD)
Detail Level: Detailed
Add 88420 Cpt? (Important Note: In 2017 The Use Of 01817 Is Being Tracked By Cms To Determine Future Global Period Reimbursement For Global Periods): no

## 2025-01-13 ENCOUNTER — APPOINTMENT (OUTPATIENT)
Dept: URBAN - METROPOLITAN AREA CLINIC 36 | Facility: CLINIC | Age: 75
Setting detail: DERMATOLOGY
End: 2025-01-13

## 2025-01-13 DIAGNOSIS — Z48.817 ENCOUNTER FOR SURGICAL AFTERCARE FOLLOWING SURGERY ON THE SKIN AND SUBCUTANEOUS TISSUE: ICD-10-CM

## 2025-01-13 PROBLEM — C44.319 BASAL CELL CARCINOMA OF SKIN OF OTHER PARTS OF FACE: Status: ACTIVE | Noted: 2025-01-13

## 2025-01-13 PROCEDURE — 99024 POSTOP FOLLOW-UP VISIT: CPT

## 2025-01-13 PROCEDURE — ? MOHS SURGERY

## 2025-01-13 PROCEDURE — 17311 MOHS 1 STAGE H/N/HF/G: CPT | Mod: 79

## 2025-01-13 PROCEDURE — ? POST-OP WOUND CHECK

## 2025-01-13 PROCEDURE — 13131 CMPLX RPR F/C/C/M/N/AX/G/H/F: CPT | Mod: 79

## 2025-01-13 ASSESSMENT — LOCATION SIMPLE DESCRIPTION DERM: LOCATION SIMPLE: RIGHT CHEEK

## 2025-01-13 ASSESSMENT — LOCATION ZONE DERM: LOCATION ZONE: FACE

## 2025-01-13 ASSESSMENT — LOCATION DETAILED DESCRIPTION DERM: LOCATION DETAILED: RIGHT MEDIAL MALAR CHEEK

## 2025-01-13 NOTE — PROCEDURE: POST-OP WOUND CHECK
Detail Level: Simple
Add 77763 Cpt? (Important Note: In 2017 The Use Of 25956 Is Being Tracked By Cms To Determine Future Global Period Reimbursement For Global Periods): yes
Quality 355: Unplanned Reoperation Within The 30 Day Postoperative Period: No return to the operating room for a surgical procedure, for complications of the principal operative procedure, within 30 days of the principal operative procedure
Quality 357: Surgical Site Infection (Ssi): No surgical site infection
Additional Comments: Would like to have core done at suture removal for other site

## 2025-01-13 NOTE — PROCEDURE: MOHS SURGERY
Lazy S Complex Repair Preamble Text (Leave Blank If You Do Not Want): Extensive wide undermining was performed.
Retention Suture Bite Size: 1 mm
Mart-1 - Negative Histology Text: MART-1 staining demonstrates a normal density and pattern of melanocytes along the dermal-epidermal junction. The surgical margins are negative for tumor cells.
Oculoplastic Surgeon Procedure Text (B): After obtaining clear surgical margins the patient was sent to oculoplastics for surgical repair.  The patient understands they will receive post-surgical care and follow-up from the referring physician's office.
Eyelid Full Thickness Repair - 80170: The eyelid defect was full thickness which required a wedge repair of the eyelid. Special care was taken to ensure that the eyelid margin was realligned when placing sutures.
Bi-Rhombic Flap Text: The defect edges were debeveled with a #15 scalpel blade.  Given the location of the defect and the proximity to free margins a bi-rhombic flap was deemed most appropriate.  Using a sterile surgical marker, an appropriate rhombic flap was drawn incorporating the defect. The area thus outlined was incised deep to adipose tissue with a #15 scalpel blade.  The skin margins were undermined to an appropriate distance in all directions utilizing iris scissors.
Star Wedge Flap Text: The defect edges were debeveled with a #15 scalpel blade.  Given the location of the defect, shape of the defect and the proximity to free margins a star wedge flap was deemed most appropriate.  Using a sterile surgical marker, an appropriate rotation flap was drawn incorporating the defect and placing the expected incisions within the relaxed skin tension lines where possible. The area thus outlined was incised deep to adipose tissue with a #15 scalpel blade.  The skin margins were undermined to an appropriate distance in all directions utilizing iris scissors.
Subsequent Stages Histo Method Verbiage: Using a similar technique to that described above, a thin layer of tissue was removed from all areas where tumor was visible on the previous stage.  The tissue was again oriented, mapped, dyed, and processed as above.
Intermediate Repair And Graft Additional Text (Will Appearing After The Standard Complex Repair Text): The intermediate repair was not sufficient to completely close the primary defect. The remaining additional defect was repaired with the graft mentioned below.
Trilobed Flap Text: The defect edges were debeveled with a #15 scalpel blade.  Given the location of the defect and the proximity to free margins a trilobed flap was deemed most appropriate.  Using a sterile surgical marker, an appropriate trilobed flap drawn around the defect.    The area thus outlined was incised deep to adipose tissue with a #15 scalpel blade.  The skin margins were undermined to an appropriate distance in all directions utilizing iris scissors.
Include Mohsaiq Anticoagulation Items In The Validation Algorithm?: No
Intermediate Repair Preamble Text (Leave Blank If You Do Not Want): Undermining was performed with blunt dissection.
Provider Procedure Text (A): After obtaining clear surgical margins the defect was repaired by another provider.
Stage 6: Additional Anesthesia Volume In Cc: 0
Show Surgical Defect Variables In The Stage Tabs: Yes
Wound Care (No Sutures): Petrolatum
Area L Indication Text: Tumors in this location are included in Area L (trunk and extremities).  Mohs surgery is indicated for larger tumors, or tumors with aggressive histologic features, in these anatomic locations.
Mid-Level Procedure Text (F): After obtaining clear surgical margins the patient was sent to a mid-level provider for surgical repair.  The patient understands they will receive post-surgical care and follow-up from the mid-level provider.
Partial Purse String (Intermediate) Text: Given the location of the defect and the characteristics of the surrounding skin an intermediate purse string closure was deemed most appropriate.  Undermining was performed circumfirentially around the surgical defect.  A purse string suture was then placed and tightened. Wound tension only allowed a partial closure of the circular defect.
Mohs Histo Method Verbiage: Each section was then chromacoded and processed in the Mohs lab using the Mohs protocol and submitted for frozen section.
O-T Plasty Text: The defect edges were debeveled with a #15 scalpel blade.  Given the location of the defect, shape of the defect and the proximity to free margins an O-T plasty was deemed most appropriate.  Using a sterile surgical marker, an appropriate O-T plasty was drawn incorporating the defect and placing the expected incisions within the relaxed skin tension lines where possible.    The area thus outlined was incised deep to adipose tissue with a #15 scalpel blade.  The skin margins were undermined to an appropriate distance in all directions utilizing iris scissors.
Epidermal Closure Graft Donor Site (Optional): running
Dressing: pressure dressing with telfa
Rotation Flap Text: The defect edges were debeveled with a #15 scalpel blade.  Given the location of the defect, shape of the defect and the proximity to free margins a rotation flap was deemed most appropriate.  Using a sterile surgical marker, an appropriate rotation flap was drawn incorporating the defect and placing the expected incisions within the relaxed skin tension lines where possible.    The area thus outlined was incised deep to adipose tissue with a #15 scalpel blade.  The skin margins were undermined to an appropriate distance in all directions utilizing iris scissors.
Mohs Case Number: [id number]
Stage 9: Additional Anesthesia Type: 1% lidocaine with epinephrine
Burow's Graft Text: The defect edges were debeveled with a #15 scalpel blade.  Given the location of the defect, shape of the defect, the proximity to free margins and the presence of a standing cone deformity a Burow's skin graft was deemed most appropriate. The standing cone was removed and this tissue was then trimmed to the shape of the primary defect. The adipose tissue was also removed until only dermis and epidermis were left.  The skin margins of the secondary defect were undermined to an appropriate distance in all directions utilizing iris scissors.  The secondary defect was closed with interrupted buried subcutaneous sutures.  The skin edges were then re-apposed with running  sutures.  The skin graft was then placed in the primary defect and oriented appropriately.
Special Stains Stage 9 - Results: Base On Clearance Noted Above
Consent (Scalp)/Introductory Paragraph: The rationale for Mohs was explained to the patient and consent was obtained. The risks, benefits and alternatives to therapy were discussed in detail. Specifically, the risks of changes in hair growth pattern secondary to repair, infection, scarring, bleeding, prolonged wound healing, incomplete removal, allergy to anesthesia, nerve injury and recurrence were addressed. Prior to the procedure, the treatment site was clearly identified and confirmed by the patient. All components of Universal Protocol/PAUSE Rule completed.
Nasolabial Transposition Flap Text: The defect edges were debeveled with a #15 scalpel blade.  Given the size, depth and location of the defect and the proximity to free margins a nasolabial transposition flap was deemed most appropriate. Using a sterile surgical marker, an appropriate flap was drawn incorporating the defect. The area thus outlined was incised with a #15 scalpel blade. The skin margins were undermined to an appropriate distance in all directions utilizing iris scissors. Following this, the designed flap was carried into the primary defect and sutured into place.
Simple / Intermediate / Complex Repair - Final Wound Length In Cm: 2.3
Rhombic Flap Text: The defect edges were debeveled with a #15 scalpel blade.  Given the location of the defect and the proximity to free margins a rhombic flap was deemed most appropriate.  Using a sterile surgical marker, an appropriate rhombic flap was drawn incorporating the defect.    The area thus outlined was incised deep to adipose tissue with a #15 scalpel blade.  The skin margins were undermined to an appropriate distance in all directions utilizing iris scissors.
Bcc Infiltrative Histology Text: There were numerous aggregates of basaloid cells demonstrating an infiltrative pattern.
Information: Selecting Yes will display possible errors in your note based on the variables you have selected. This validation is only offered as a suggestion for you. PLEASE NOTE THAT THE VALIDATION TEXT WILL BE REMOVED WHEN YOU FINALIZE YOUR NOTE. IF YOU WANT TO FAX A PRELIMINARY NOTE YOU WILL NEED TO TOGGLE THIS TO 'NO' IF YOU DO NOT WANT IT IN YOUR FAXED NOTE.
Island Pedicle Flap-Requiring Vessel Identification Text: The defect edges were debeveled with a #15 scalpel blade.  Given the location of the defect, shape of the defect and the proximity to free margins an island pedicle advancement flap was deemed most appropriate.  Using a sterile surgical marker, an appropriate advancement flap was drawn, based on the axial vessel mentioned above, incorporating the defect, outlining the appropriate donor tissue and placing the expected incisions within the relaxed skin tension lines where possible.    The area thus outlined was incised deep to adipose tissue with a #15 scalpel blade.  The skin margins were undermined to an appropriate distance in all directions around the primary defect and laterally outward around the island pedicle utilizing iris scissors.  There was minimal undermining beneath the pedicle flap.
Hemigard Postcare Instructions: The HEMIGARD strips are to remain completely dry for at least 5-7 days.
Which Instrument Did You Use For Dermabrasion?: Wire Brush
W Plasty Text: The lesion was extirpated to the level of the fat with a #15 scalpel blade.  Given the location of the defect, shape of the defect and the proximity to free margins a W-plasty was deemed most appropriate for repair.  Using a sterile surgical marker, the appropriate transposition arms of the W-plasty were drawn incorporating the defect and placing the expected incisions within the relaxed skin tension lines where possible.    The area thus outlined was incised deep to adipose tissue with a #15 scalpel blade.  The skin margins were undermined to an appropriate distance in all directions utilizing iris scissors.  The opposing transposition arms were then transposed into place in opposite direction and anchored with interrupted buried subcutaneous sutures.
Closure 3 Information: This tab is for additional flaps and grafts above and beyond our usual structured repairs.  Please note if you enter information here it will not currently bill and you will need to add the billing information manually.
Cheek Interpolation Flap Text: A decision was made to reconstruct the defect utilizing an interpolation axial flap and a staged reconstruction.  A telfa template was made of the defect.  This telfa template was then used to outline the Cheek Interpolation flap.  The donor area for the pedicle flap was then injected with anesthesia.  The flap was excised through the skin and subcutaneous tissue down to the layer of the underlying musculature.  The interpolation flap was carefully excised within this deep plane to maintain its blood supply.  The edges of the donor site were undermined.   The donor site was closed in a primary fashion.  The pedicle was then rotated into position and sutured.  Once the tube was sutured into place, adequate blood supply was confirmed with blanching and refill.  The pedicle was then wrapped with xeroform gauze and dressed appropriately with a telfa and gauze bandage to ensure continued blood supply and protect the attached pedicle.
Burow's Advancement Flap Text: The defect edges were debeveled with a #15 scalpel blade.  Given the location of the defect and the proximity to free margins a Burow's advancement flap was deemed most appropriate.  Using a sterile surgical marker, the appropriate advancement flap was drawn incorporating the defect and placing the expected incisions within the relaxed skin tension lines where possible.    The area thus outlined was incised deep to adipose tissue with a #15 scalpel blade.  The skin margins were undermined to an appropriate distance in all directions utilizing iris scissors.
Estimated Blood Loss (Cc): minimal
Deep Sutures: 5-0 Monocryl
Transposition Flap Text: The defect edges were debeveled with a #15 scalpel blade.  Given the location of the defect and the proximity to free margins a transposition flap was deemed most appropriate.  Using a sterile surgical marker, an appropriate transposition flap was drawn incorporating the defect.    The area thus outlined was incised deep to adipose tissue with a #15 scalpel blade.  The skin margins were undermined to an appropriate distance in all directions utilizing iris scissors.
Additional Anesthesia Volume In Cc: 6
Chonodrocutaneous Helical Advancement Flap Text: The defect edges were debeveled with a #15 scalpel blade.  Given the location of the defect and the proximity to free margins a chondrocutaneous helical advancement flap was deemed most appropriate.  Using a sterile surgical marker, the appropriate advancement flap was drawn incorporating the defect and placing the expected incisions within the relaxed skin tension lines where possible.    The area thus outlined was incised deep to adipose tissue with a #15 scalpel blade.  The skin margins were undermined to an appropriate distance in all directions utilizing iris scissors.
Mart-1 - Positive Histology Text: MART-1 staining demonstrates areas of higher density and clustering of melanocytes with Pagetoid spread upwards within the epidermis. The surgical margins are positive for tumor cells.
Bilobed Transposition Flap Text: The defect edges were debeveled with a #15 scalpel blade.  Given the location of the defect and the proximity to free margins a bilobed transposition flap was deemed most appropriate.  Using a sterile surgical marker, an appropriate bilobe flap drawn around the defect.    The area thus outlined was incised deep to adipose tissue with a #15 scalpel blade.  The skin margins were undermined to an appropriate distance in all directions utilizing iris scissors.
Estlander Flap (Upper To Lower Lip) Text: The defect of the lower lip was assessed and measured.  Given the location and size of the defect, an Estlander flap was deemed most appropriate.  Using a sterile surgical marker, an appropriate Estlander flap was measured and drawn on the upper lip. Local anesthesia was then infiltrated. A scalpel was then used to incise the lateral aspect of the flap, through skin, muscle and mucosa, leaving the flap pedicled medially.  The flap was then rotated and positioned to fill the lower lip defect.  The flap was then sutured into place with a three layer technique, closing the orbicularis oris muscle layer with subcutaneous buried sutures, followed by a mucosal layer and an epidermal layer.
Split-Thickness Skin Graft Text: The defect edges were debeveled with a #15 scalpel blade.  Given the location of the defect, shape of the defect and the proximity to free margins a split thickness skin graft was deemed most appropriate.  Using a sterile surgical marker, the primary defect shape was transferred to the donor site. The split thickness graft was then harvested.  The skin graft was then placed in the primary defect and oriented appropriately.
Xenograft Text: The defect edges were debeveled with a #15 scalpel blade.  Given the location of the defect, shape of the defect and the proximity to free margins a xenograft was deemed most appropriate.  The graft was then trimmed to fit the size of the defect.  The graft was then placed in the primary defect and oriented appropriately.
Staged Advancement Flap Text: The defect edges were debeveled with a #15 scalpel blade.  Given the location of the defect, shape of the defect and the proximity to free margins a staged advancement flap was deemed most appropriate.  Using a sterile surgical marker, an appropriate advancement flap was drawn incorporating the defect and placing the expected incisions within the relaxed skin tension lines where possible. The area thus outlined was incised deep to adipose tissue with a #15 scalpel blade.  The skin margins were undermined to an appropriate distance in all directions utilizing iris scissors.
Consent 3/Introductory Paragraph: I gave the patient a chance to ask questions they had about the procedure.  Following this I explained the Mohs procedure and consent was obtained. The risks, benefits and alternatives to therapy were discussed in detail. Specifically, the risks of infection, scarring, bleeding, prolonged wound healing, incomplete removal, allergy to anesthesia, nerve injury and recurrence were addressed. Prior to the procedure, the treatment site was clearly identified and confirmed by the patient. All components of Universal Protocol/PAUSE Rule completed.
Asc Procedure Text (B): After obtaining clear surgical margins the patient was sent to an ASC for surgical repair.  The patient understands they will receive post-surgical care and follow-up from the ASC physician.
Ear Wedge Repair Text: A wedge excision was completed by carrying down an excision through the full thickness of the ear and cartilage with an inward facing Burow's triangle. The wound was then closed in a layered fashion.
Consent (Spinal Accessory)/Introductory Paragraph: The rationale for Mohs was explained to the patient and consent was obtained. The risks, benefits and alternatives to therapy were discussed in detail. Specifically, the risks of damage to the spinal accessory nerve, infection, scarring, bleeding, prolonged wound healing, incomplete removal, allergy to anesthesia, and recurrence were addressed. Prior to the procedure, the treatment site was clearly identified and confirmed by the patient. All components of Universal Protocol/PAUSE Rule completed.
Anesthesia Volume In Cc: 3
Purse String (Simple) Text: Given the location of the defect and the characteristics of the surrounding skin a purse string closure was deemed most appropriate.  Undermining was performed circumfirentially around the surgical defect.  A purse string suture was then placed and tightened.
V-Y Flap Text: The defect edges were debeveled with a #15 scalpel blade.  Given the location of the defect, shape of the defect and the proximity to free margins a V-Y flap was deemed most appropriate.  Using a sterile surgical marker, an appropriate advancement flap was drawn incorporating the defect and placing the expected incisions within the relaxed skin tension lines where possible.    The area thus outlined was incised deep to adipose tissue with a #15 scalpel blade.  The skin margins were undermined to an appropriate distance in all directions utilizing iris scissors.
Bill 59 Modifier?: No - Continue to Bill 79 Modifier
Secondary Intention Text (Leave Blank If You Do Not Want): The defect will heal with secondary intention.
Mastoid Interpolation Flap Text: A decision was made to reconstruct the defect utilizing an interpolation axial flap and a staged reconstruction.  A telfa template was made of the defect.  This telfa template was then used to outline the mastoid interpolation flap.  The donor area for the pedicle flap was then injected with anesthesia.  The flap was excised through the skin and subcutaneous tissue down to the layer of the underlying musculature.  The pedicle flap was carefully excised within this deep plane to maintain its blood supply.  The edges of the donor site were undermined.   The donor site was closed in a primary fashion.  The pedicle was then rotated into position and sutured.  Once the tube was sutured into place, adequate blood supply was confirmed with blanching and refill.  The pedicle was then wrapped with xeroform gauze and dressed appropriately with a telfa and gauze bandage to ensure continued blood supply and protect the attached pedicle.
Referred To Plastics For Closure Text (Leave Blank If You Do Not Want): After obtaining clear surgical margins the patient was sent to plastics for surgical repair.  The patient understands they will receive post-surgical care and follow-up from the referring physician's office.
Localized Dermabrasion With 15 Blade Text: The patient was draped in routine manner.  Localized dermabrasion using a 15 blade was performed in routine manner to papillary dermis. This spot dermabrasion is being performed to complete skin cancer reconstruction. It also will eliminate the other sun damaged precancerous cells that are known to be part of the regional effect of a lifetime's worth of sun exposure. This localized dermabrasion is therapeutic and should not be considered cosmetic in any regard.
O-Z Flap Text: The defect edges were debeveled with a #15 scalpel blade.  Given the location of the defect, shape of the defect and the proximity to free margins an O-Z flap was deemed most appropriate.  Using a sterile surgical marker, an appropriate transposition flap was drawn incorporating the defect and placing the expected incisions within the relaxed skin tension lines where possible. The area thus outlined was incised deep to adipose tissue with a #15 scalpel blade.  The skin margins were undermined to an appropriate distance in all directions utilizing iris scissors.
Cheiloplasty (Complex) Text: A decision was made to reconstruct the defect with a  cheiloplasty.  The defect was undermined extensively.  Additional obicularis oris muscle was excised with a 15 blade scalpel.  The defect was converted into a full thickness wedge to facilite a better cosmetic result.  Small vessels were then tied off with 5-0 monocyrl. The obicularis oris, superficial fascia, adipose and dermis were then reapproximated.  After the deeper layers were approximated the epidermis was reapproximated with particular care given to realign the vermilion border.
Otolaryngologist Procedure Text (E): After obtaining clear surgical margins the patient was sent to otolaryngology for surgical repair.  The patient understands they will receive post-surgical care and follow-up from the referring physician's office.
Crescentic Advancement Flap Text: The defect edges were debeveled with a #15 scalpel blade.  Given the location of the defect and the proximity to free margins a crescentic advancement flap was deemed most appropriate.  Using a sterile surgical marker, the appropriate advancement flap was drawn incorporating the defect and placing the expected incisions within the relaxed skin tension lines where possible.    The area thus outlined was incised deep to adipose tissue with a #15 scalpel blade.  The skin margins were undermined to an appropriate distance in all directions utilizing iris scissors.
Surgeon: Leona Schafer MD
O-L Flap Text: The defect edges were debeveled with a #15 scalpel blade.  Given the location of the defect, shape of the defect and the proximity to free margins an O-L flap was deemed most appropriate.  Using a sterile surgical marker, an appropriate advancement flap was drawn incorporating the defect and placing the expected incisions within the relaxed skin tension lines where possible.    The area thus outlined was incised deep to adipose tissue with a #15 scalpel blade.  The skin margins were undermined to an appropriate distance in all directions utilizing iris scissors.
Posterior Auricular Interpolation Flap Text: A decision was made to reconstruct the defect utilizing an interpolation axial flap and a staged reconstruction.  A telfa template was made of the defect.  This telfa template was then used to outline the posterior auricular interpolation flap.  The donor area for the pedicle flap was then injected with anesthesia.  The flap was excised through the skin and subcutaneous tissue down to the layer of the underlying musculature.  The pedicle flap was carefully excised within this deep plane to maintain its blood supply.  The edges of the donor site were undermined.   The donor site was closed in a primary fashion.  The pedicle was then rotated into position and sutured.  Once the tube was sutured into place, adequate blood supply was confirmed with blanching and refill.  The pedicle was then wrapped with xeroform gauze and dressed appropriately with a telfa and gauze bandage to ensure continued blood supply and protect the attached pedicle.
Was The Patient On Physician Recommended Anticoagulation Therapy?: Please Select the Appropriate Response
Post-Care Instructions: I reviewed with the patient in detail post-care instructions. Patient is not to engage in any heavy lifting, exercise, or swimming for the next 14 days. Should the patient develop any fevers, chills, bleeding, severe pain patient will contact the office immediately.
O-Z Plasty Text: The defect edges were debeveled with a #15 scalpel blade.  Given the location of the defect, shape of the defect and the proximity to free margins an O-Z plasty (double transposition flap) was deemed most appropriate.  Using a sterile surgical marker, the appropriate transposition flaps were drawn incorporating the defect and placing the expected incisions within the relaxed skin tension lines where possible.    The area thus outlined was incised deep to adipose tissue with a #15 scalpel blade.  The skin margins were undermined to an appropriate distance in all directions utilizing iris scissors.  Hemostasis was achieved with electrocautery.  The flaps were then transposed into place, one clockwise and the other counterclockwise, and anchored with interrupted buried subcutaneous sutures.
Wound Care: Vaseline
Muscle Hinge Flap Text: The defect edges were debeveled with a #15 scalpel blade.  Given the size, depth and location of the defect and the proximity to free margins a muscle hinge flap was deemed most appropriate.  Using a sterile surgical marker, an appropriate hinge flap was drawn incorporating the defect. The area thus outlined was incised with a #15 scalpel blade.  The skin margins were undermined to an appropriate distance in all directions utilizing iris scissors.
V-Y Plasty Text: The defect edges were debeveled with a #15 scalpel blade.  Given the location of the defect, shape of the defect and the proximity to free margins an V-Y advancement flap was deemed most appropriate.  Using a sterile surgical marker, an appropriate advancement flap was drawn incorporating the defect and placing the expected incisions within the relaxed skin tension lines where possible.    The area thus outlined was incised deep to adipose tissue with a #15 scalpel blade.  The skin margins were undermined to an appropriate distance in all directions utilizing iris scissors.
Peng Advancement Flap Text: The defect edges were debeveled with a #15 scalpel blade.  Given the location of the defect, shape of the defect and the proximity to free margins a Peng advancement flap was deemed most appropriate.  Using a sterile surgical marker, an appropriate advancement flap was drawn incorporating the defect and placing the expected incisions within the relaxed skin tension lines where possible. The area thus outlined was incised deep to adipose tissue with a #15 scalpel blade.  The skin margins were undermined to an appropriate distance in all directions utilizing iris scissors.
Presence Of Scar Tissue (For Histology): absent
Donor Site Anesthesia Type: same as repair anesthesia
Dermal Autograft Text: The defect edges were debeveled with a #15 scalpel blade.  Given the location of the defect, shape of the defect and the proximity to free margins a dermal autograft was deemed most appropriate.  Using a sterile surgical marker, the primary defect shape was transferred to the donor site. The area thus outlined was incised deep to adipose tissue with a #15 scalpel blade.  The harvested graft was then trimmed of adipose and epidermal tissue until only dermis was left.  The skin graft was then placed in the primary defect and oriented appropriately.
Abbe Flap (Upper To Lower Lip) Text: The defect of the lower lip was assessed and measured.  Given the location and size of the defect, an Abbe flap was deemed most appropriate.  Using a sterile surgical marker, an appropriate Abbe flap was measured and drawn on the upper lip. Local anesthesia was then infiltrated.  A scalpel was then used to incise the upper lip through and through the skin, vermilion, muscle and mucosa, leaving the flap pedicled on the opposite side.  The flap was then rotated and transferred to the lower lip defect.  The flap was then sutured into place with a three layer technique, closing the orbicularis oris muscle layer with subcutaneous buried sutures, followed by a mucosal layer and an epidermal layer.
Suturegard Retention Suture: 0-0 Nylon
Wound Check: 6 weeks
Alar Island Pedicle Flap Text: The defect edges were debeveled with a #15 scalpel blade.  Given the location of the defect, shape of the defect and the proximity to the alar rim an island pedicle advancement flap was deemed most appropriate.  Using a sterile surgical marker, an appropriate advancement flap was drawn incorporating the defect, outlining the appropriate donor tissue and placing the expected incisions within the nasal ala running parallel to the alar rim. The area thus outlined was incised with a #15 scalpel blade.  The skin margins were undermined minimally to an appropriate distance in all directions around the primary defect and laterally outward around the island pedicle utilizing iris scissors.  There was minimal undermining beneath the pedicle flap.
Where Do You Want The Question To Include Opioid Counseling Located?: Case Summary Tab
Unna Boot Text: An Unna boot was placed to help immobilize the limb and facilitate more rapid healing.
A-T Advancement Flap Text: The defect edges were debeveled with a #15 scalpel blade.  Given the location of the defect, shape of the defect and the proximity to free margins an A-T advancement flap was deemed most appropriate.  Using a sterile surgical marker, an appropriate advancement flap was drawn incorporating the defect and placing the expected incisions within the relaxed skin tension lines where possible.    The area thus outlined was incised deep to adipose tissue with a #15 scalpel blade.  The skin margins were undermined to an appropriate distance in all directions utilizing iris scissors.
Consent (Lip)/Introductory Paragraph: The rationale for Mohs was explained to the patient and consent was obtained. The risks, benefits and alternatives to therapy were discussed in detail. Specifically, the risks of lip deformity, changes in the oral aperture, infection, scarring, bleeding, prolonged wound healing, incomplete removal, allergy to anesthesia, nerve injury and recurrence were addressed. Prior to the procedure, the treatment site was clearly identified and confirmed by the patient. All components of Universal Protocol/PAUSE Rule completed.
Consent (Marginal Mandibular)/Introductory Paragraph: The rationale for Mohs was explained to the patient and consent was obtained. The risks, benefits and alternatives to therapy were discussed in detail. Specifically, the risks of damage to the marginal mandibular branch of the facial nerve, infection, scarring, bleeding, prolonged wound healing, incomplete removal, allergy to anesthesia, and recurrence were addressed. Prior to the procedure, the treatment site was clearly identified and confirmed by the patient. All components of Universal Protocol/PAUSE Rule completed.
Consent Type: Consent 1 (Standard)
Island Pedicle Flap With Canthal Suspension Text: The defect edges were debeveled with a #15 scalpel blade.  Given the location of the defect, shape of the defect and the proximity to free margins an island pedicle advancement flap was deemed most appropriate.  Using a sterile surgical marker, an appropriate advancement flap was drawn incorporating the defect, outlining the appropriate donor tissue and placing the expected incisions within the relaxed skin tension lines where possible. The area thus outlined was incised deep to adipose tissue with a #15 scalpel blade.  The skin margins were undermined to an appropriate distance in all directions around the primary defect and laterally outward around the island pedicle utilizing iris scissors.  There was minimal undermining beneath the pedicle flap. A suspension suture was placed in the canthal tendon to prevent tension and prevent ectropion.
Closure 2 Information: This tab is for additional flaps and grafts, including complex repair and grafts and complex repair and flaps. You can also specify a different location for the additional defect, if the location is the same you do not need to select a new one. We will insert the automated text for the repair you select below just as we do for solitary flaps and grafts. Please note that at this time if you select a location with a different insurance zone you will need to override the ICD10 and CPT if appropriate.
Advancement Flap (Double) Text: The defect edges were debeveled with a #15 scalpel blade.  Given the location of the defect and the proximity to free margins a double advancement flap was deemed most appropriate.  Using a sterile surgical marker, the appropriate advancement flaps were drawn incorporating the defect and placing the expected incisions within the relaxed skin tension lines where possible.    The area thus outlined was incised deep to adipose tissue with a #15 scalpel blade.  The skin margins were undermined to an appropriate distance in all directions utilizing iris scissors.
No Repair - Repaired With Adjacent Surgical Defect Text (Leave Blank If You Do Not Want): After obtaining clear surgical margins the defect was repaired concurrently with another surgical defect which was in close approximation.
Pain Refusal Text: I offered to prescribe pain medication but the patient refused to take this medication.
Epidermal Closure: running cuticular
Previous Accession (Optional): [id number]
Surgeon/Pathologist Verbiage (Will Incorporate Name Of Surgeon From Intro If Not Blank): operated in two distinct and integrated capacities as the surgeon and pathologist.
Repair Anesthesia Method: local infiltration
Area H Indication Text: Tumors in this location are included in Area H (eyelids, eyebrows, nose, lips, chin, ear, pre-auricular, post-auricular, temple, genitalia, hands, feet, ankles and areola).  Tissue conservation is critical in these anatomic locations.
No Residual Tumor Seen Histology Text: There were no malignant cells seen in the sections examined.
Melolabial Transposition Flap Text: The defect edges were debeveled with a #15 scalpel blade.  Given the location of the defect and the proximity to free margins a melolabial flap was deemed most appropriate.  Using a sterile surgical marker, an appropriate melolabial transposition flap was drawn incorporating the defect.    The area thus outlined was incised deep to adipose tissue with a #15 scalpel blade.  The skin margins were undermined to an appropriate distance in all directions utilizing iris scissors.
Tissue Cultured Epidermal Autograft Text: The defect edges were debeveled with a #15 scalpel blade.  Given the location of the defect, shape of the defect and the proximity to free margins a tissue cultured epidermal autograft was deemed most appropriate.  The graft was then trimmed to fit the size of the defect.  The graft was then placed in the primary defect and oriented appropriately.
Skin Substitute Text: The defect edges were debeveled with a #15 scalpel blade.  Given the location of the defect, shape of the defect and the proximity to free margins a skin substitute graft was deemed most appropriate.  The graft material was trimmed to fit the size of the defect. The graft was then placed in the primary defect and oriented appropriately.
Ftsg Text: The defect edges were debeveled with a #15 scalpel blade.  Given the location of the defect, shape of the defect and the proximity to free margins a full thickness skin graft was deemed most appropriate.  Using a sterile surgical marker, the primary defect shape was transferred to the donor site. The area thus outlined was incised deep to adipose tissue with a #15 scalpel blade.  The harvested graft was then trimmed of adipose tissue until only dermis and epidermis was left.  The skin margins of the secondary defect were undermined to an appropriate distance in all directions utilizing iris scissors.  The secondary defect was closed with interrupted buried subcutaneous sutures.  The skin edges were then re-apposed with running  sutures.  The skin graft was then placed in the primary defect and oriented appropriately.
Which Eyelid Repair Cpt Are You Using?: 10935
Mercedes Flap Text: The defect edges were debeveled with a #15 scalpel blade.  Given the location of the defect, shape of the defect and the proximity to free margins a Mercedes flap was deemed most appropriate.  Using a sterile surgical marker, an appropriate advancement flap was drawn incorporating the defect and placing the expected incisions within the relaxed skin tension lines where possible. The area thus outlined was incised deep to adipose tissue with a #15 scalpel blade.  The skin margins were undermined to an appropriate distance in all directions utilizing iris scissors.
Suturegard Intro: Intraoperative tissue expansion was performed, utilizing the SUTUREGARD device, in order to reduce wound tension.
Bcc Histology Text: There were numerous aggregates of basaloid cells.
O-T Advancement Flap Text: The defect edges were debeveled with a #15 scalpel blade.  Given the location of the defect, shape of the defect and the proximity to free margins an O-T advancement flap was deemed most appropriate.  Using a sterile surgical marker, an appropriate advancement flap was drawn incorporating the defect and placing the expected incisions within the relaxed skin tension lines where possible.    The area thus outlined was incised deep to adipose tissue with a #15 scalpel blade.  The skin margins were undermined to an appropriate distance in all directions utilizing iris scissors.
Consent (Ear)/Introductory Paragraph: The rationale for Mohs was explained to the patient and consent was obtained. The risks, benefits and alternatives to therapy were discussed in detail. Specifically, the risks of ear deformity, infection, scarring, bleeding, prolonged wound healing, incomplete removal, allergy to anesthesia, nerve injury and recurrence were addressed. Prior to the procedure, the treatment site was clearly identified and confirmed by the patient. All components of Universal Protocol/PAUSE Rule completed.
X Size Of Lesion In Cm (Optional): 0.3
Zygomaticofacial Flap Text: Given the location of the defect, shape of the defect and the proximity to free margins a zygomaticofacial flap was deemed most appropriate for repair.  Using a sterile surgical marker, the appropriate flap was drawn incorporating the defect and placing the expected incisions within the relaxed skin tension lines where possible. The area thus outlined was incised deep to adipose tissue with a #15 scalpel blade with preservation of a vascular pedicle.  The skin margins were undermined to an appropriate distance in all directions utilizing iris scissors.  The flap was then placed into the defect and anchored with interrupted buried subcutaneous sutures.
Suturegard Body: The suture ends were repeatedly re-tightened and re-clamped to achieve the desired tissue expansion.
Dorsal Nasal Flap Text: The defect edges were debeveled with a #15 scalpel blade.  Given the location of the defect and the proximity to free margins a dorsal nasal flap was deemed most appropriate.  Using a sterile surgical marker, an appropriate dorsal nasal flap was drawn around the defect.    The area thus outlined was incised deep to adipose tissue with a #15 scalpel blade.  The skin margins were undermined to an appropriate distance in all directions utilizing iris scissors.
H Plasty Text: Given the location of the defect, shape of the defect and the proximity to free margins a H-plasty was deemed most appropriate for repair.  Using a sterile surgical marker, the appropriate advancement arms of the H-plasty were drawn incorporating the defect and placing the expected incisions within the relaxed skin tension lines where possible. The area thus outlined was incised deep to adipose tissue with a #15 scalpel blade. The skin margins were undermined to an appropriate distance in all directions utilizing iris scissors.  The opposing advancement arms were then advanced into place in opposite direction and anchored with interrupted buried subcutaneous sutures.
Banner Transposition Flap Text: The defect edges were debeveled with a #15 scalpel blade.  Given the location of the defect and the proximity to free margins a Banner transposition flap was deemed most appropriate.  Using a sterile surgical marker, an appropriate flap drawn around the defect. The area thus outlined was incised deep to adipose tissue with a #15 scalpel blade.  The skin margins were undermined to an appropriate distance in all directions utilizing iris scissors.
Complex Repair And Flap Additional Text (Will Appearing After The Standard Complex Repair Text): The complex repair was not sufficient to completely close the primary defect. The remaining additional defect was repaired with the flap mentioned below.
Rhomboid Transposition Flap Text: The defect edges were debeveled with a #15 scalpel blade.  Given the location of the defect and the proximity to free margins a rhomboid transposition flap was deemed most appropriate.  Using a sterile surgical marker, an appropriate rhomboid flap was drawn incorporating the defect.    The area thus outlined was incised deep to adipose tissue with a #15 scalpel blade.  The skin margins were undermined to an appropriate distance in all directions utilizing iris scissors.
Consent 2/Introductory Paragraph: Mohs surgery was explained to the patient and consent was obtained. The risks, benefits and alternatives to therapy were discussed in detail. Specifically, the risks of infection, scarring, bleeding, prolonged wound healing, incomplete removal, allergy to anesthesia, nerve injury and recurrence were addressed. Prior to the procedure, the treatment site was clearly identified and confirmed by the patient. All components of Universal Protocol/PAUSE Rule completed.
Keystone Flap Text: The defect edges were debeveled with a #15 scalpel blade.  Given the location of the defect, shape of the defect a keystone flap was deemed most appropriate.  Using a sterile surgical marker, an appropriate keystone flap was drawn incorporating the defect, outlining the appropriate donor tissue and placing the expected incisions within the relaxed skin tension lines where possible. The area thus outlined was incised deep to adipose tissue with a #15 scalpel blade.  The skin margins were undermined to an appropriate distance in all directions around the primary defect and laterally outward around the flap utilizing iris scissors.
Referring Physician (Optional): Akshat
Flip-Flop Flap Text: The defect edges were debeveled with a #15 blade scalpel.  Given the location of the defect and the proximity to free margins a flip-flop flap was deemed most appropriate. Using a sterile surgical marker, the appropriate flap was drawn incorporating the defect and placing the expected incisions between the helical rim and antihelix where possible.  The area thus outlined was incised through and through with a #15 scalpel blade. Following this, the designed flap was carried over into the primary defect and sutured into place.
Bilateral Helical Rim Advancement Flap Text: The defect edges were debeveled with a #15 blade scalpel.  Given the location of the defect and the proximity to free margins (helical rim) a bilateral helical rim advancement flap was deemed most appropriate.  Using a sterile surgical marker, the appropriate advancement flaps were drawn incorporating the defect and placing the expected incisions between the helical rim and antihelix where possible.  The area thus outlined was incised through and through with a #15 scalpel blade.  With a skin hook and iris scissors, the flaps were gently and sharply undermined and freed up.
Consent (Temporal Branch)/Introductory Paragraph: The rationale for Mohs was explained to the patient and consent was obtained. The risks, benefits and alternatives to therapy were discussed in detail. Specifically, the risks of damage to the temporal branch of the facial nerve, infection, scarring, bleeding, prolonged wound healing, incomplete removal, allergy to anesthesia, and recurrence were addressed. Prior to the procedure, the treatment site was clearly identified and confirmed by the patient. All components of Universal Protocol/PAUSE Rule completed.
Width Of Defect Perpendicular To Closure In Cm (Required): 0.7
Localized Dermabrasion With Sand Papertext: The patient was draped in routine manner.  Localized dermabrasion using sterile sand paper was performed in routine manner to papillary dermis. This spot dermabrasion is being performed to complete skin cancer reconstruction. It also will eliminate the other sun damaged precancerous cells that are known to be part of the regional effect of a lifetime's worth of sun exposure. This localized dermabrasion is therapeutic and should not be considered cosmetic in any regard.
Helical Rim Text: The closure involved the helical rim.
Advancement Flap (Single) Text: The defect edges were debeveled with a #15 scalpel blade.  Given the location of the defect and the proximity to free margins a single advancement flap was deemed most appropriate.  Using a sterile surgical marker, an appropriate advancement flap was drawn incorporating the defect and placing the expected incisions within the relaxed skin tension lines where possible.    The area thus outlined was incised deep to adipose tissue with a #15 scalpel blade.  The skin margins were undermined to an appropriate distance in all directions utilizing iris scissors.
Spiral Flap Text: The defect edges were debeveled with a #15 scalpel blade.  Given the location of the defect, shape of the defect and the proximity to free margins a spiral flap was deemed most appropriate.  Using a sterile surgical marker, an appropriate rotation flap was drawn incorporating the defect and placing the expected incisions within the relaxed skin tension lines where possible. The area thus outlined was incised deep to adipose tissue with a #15 scalpel blade.  The skin margins were undermined to an appropriate distance in all directions utilizing iris scissors.
Tarsorrhaphy Text: A tarsorrhaphy was performed using Frost sutures.
Double Z Plasty Text: The lesion was extirpated to the level of the fat with a #15 scalpel blade. Given the location of the defect, shape of the defect and the proximity to free margins a double Z-plasty was deemed most appropriate for repair. Using a sterile surgical marker, the appropriate transposition arms of the double Z-plasty were drawn incorporating the defect and placing the expected incisions within the relaxed skin tension lines where possible. The area thus outlined was incised deep to adipose tissue with a #15 scalpel blade. The skin margins were undermined to an appropriate distance in all directions utilizing iris scissors. The opposing transposition arms were then transposed and carried over into place in opposite direction and anchored with interrupted buried subcutaneous sutures.
Partial Purse String (Simple) Text: Given the location of the defect and the characteristics of the surrounding skin a simple purse string closure was deemed most appropriate.  Undermining was performed circumfirentially around the surgical defect.  A purse string suture was then placed and tightened. Wound tension only allowed a partial closure of the circular defect.
Initial Size Of Lesion: 0.5
Medical Necessity Statement: Based on my medical judgement, Mohs surgery is the most appropriate treatment for this cancer compared to other treatments.
Complex Repair And Graft Additional Text (Will Appearing After The Standard Complex Repair Text): The complex repair was not sufficient to completely close the primary defect. The remaining additional defect was repaired with the graft mentioned below.
Localized Dermabrasion With Wire Brush Text: The patient was draped in routine manner.  Localized dermabrasion using 3 x 17 mm wire brush was performed in routine manner to papillary dermis. This spot dermabrasion is being performed to complete skin cancer reconstruction. It also will eliminate the other sun damaged precancerous cells that are known to be part of the regional effect of a lifetime's worth of sun exposure. This localized dermabrasion is therapeutic and should not be considered cosmetic in any regard.
Abbe Flap (Lower To Upper Lip) Text: The defect of the upper lip was assessed and measured.  Given the location and size of the defect, an Abbe flap was deemed most appropriate.  Using a sterile surgical marker, an appropriate Abbe flap was measured and drawn on the lower lip. Local anesthesia was then infiltrated. A scalpel was then used to incise the upper lip through and through the skin, vermilion, muscle and mucosa, leaving the flap pedicled on the opposite side.  The flap was then rotated and transferred to the lower lip defect.  The flap was then sutured into place with a three layer technique, closing the orbicularis oris muscle layer with subcutaneous buried sutures, followed by a mucosal layer and an epidermal layer.
Mohs Method Verbiage: An incision at a 45 degree angle following the standard Mohs approach was done and the specimen was harvested as a microscopic controlled layer.
Eye Protection Verbiage: Before proceeding with the stage, a plastic scleral shield was inserted. The globe was anesthetized with a few drops of 1% lidocaine with 1:100,000 epinephrine. Then, an appropriate sized scleral shield was chosen and coated with lacrilube ointment. The shield was gently inserted and left in place for the duration of each stage. After the stage was completed, the shield was gently removed.
Helical Rim Advancement Flap Text: The defect edges were debeveled with a #15 blade scalpel.  Given the location of the defect and the proximity to free margins (helical rim) a double helical rim advancement flap was deemed most appropriate.  Using a sterile surgical marker, the appropriate advancement flaps were drawn incorporating the defect and placing the expected incisions between the helical rim and antihelix where possible.  The area thus outlined was incised through and through with a #15 scalpel blade.  With a skin hook and iris scissors, the flaps were gently and sharply undermined and freed up.
Nostril Rim Text: The closure involved the nostril rim.
Adjacent Tissue Transfer Text: The defect edges were debeveled with a #15 scalpel blade.  Given the location of the defect and the proximity to free margins an adjacent tissue transfer was deemed most appropriate.  Using a sterile surgical marker, an appropriate flap was drawn incorporating the defect and placing the expected incisions within the relaxed skin tension lines where possible.    The area thus outlined was incised deep to adipose tissue with a #15 scalpel blade.  The skin margins were undermined to an appropriate distance in all directions utilizing iris scissors.
Date Of Previous Biopsy (Optional): 11/7/24
Repair Type: Complex Repair
Interpolation Flap Text: A decision was made to reconstruct the defect utilizing an interpolation axial flap and a staged reconstruction.  A telfa template was made of the defect.  This telfa template was then used to outline the interpolation flap.  The donor area for the pedicle flap was then injected with anesthesia.  The flap was excised through the skin and subcutaneous tissue down to the layer of the underlying musculature.  The interpolation flap was carefully excised within this deep plane to maintain its blood supply.  The edges of the donor site were undermined.   The donor site was closed in a primary fashion.  The pedicle was then rotated into position and sutured.  Once the tube was sutured into place, adequate blood supply was confirmed with blanching and refill.  The pedicle was then wrapped with xeroform gauze and dressed appropriately with a telfa and gauze bandage to ensure continued blood supply and protect the attached pedicle.
Home Suture Removal Text: Patient was provided instructions on removing sutures and will remove their sutures at home.  If they have any questions or difficulties they will call the office.
Primary Defect Length In Cm (Final Defect Size - Required For Flaps/Grafts): 0.8
Composite Graft Text: The defect edges were debeveled with a #15 scalpel blade.  Given the location of the defect, shape of the defect, the proximity to free margins and the fact the defect was full thickness a composite graft was deemed most appropriate.  The defect was outline and then transferred to the donor site.  A full thickness graft was then excised from the donor site. The graft was then placed in the primary defect, oriented appropriately and then sutured into place.  The secondary defect was then repaired using a primary closure.
Graft Donor Site Dermal Sutures (Optional): 5-0 Polysorb
Nasalis Myocutaneous Flap Text: Using a #15 blade, an incision was made around the donor flap to the level of the nasalis muscle. Wide lateral undermining was then performed in both the subcutaneous plane above the nasalis muscle, and in a submuscular plane just above periosteum. This allowed the formation of a free nasalis muscle axial pedicle which was still attached to the actual cutaneous flap, increasing its mobility and vascular viability. Hemostasis was obtained with pinpoint electrocoagulation. The flap was mobilized into position and the pivotal anchor points positioned and stabilized with buried interrupted sutures. Subcutaneous and dermal tissues were closed in a multilayered fashion with sutures. Tissue redundancies were excised, and the epidermal edges were apposed without significant tension and sutured with sutures.
Staging Info: By selecting yes to the question above you will include information on AJCC 8 tumor staging in your Mohs note. Information on tumor staging will be automatically added for SCCs on the head and neck. AJCC 8 includes tumor size, tumor depth, perineural involvement and bone invasion.
Graft Cartilage Fenestration Text: The cartilage was fenestrated with a 2mm punch biopsy to help facilitate graft survival and healing.
Ear Star Wedge Flap Text: The defect edges were debeveled with a #15 blade scalpel.  Given the location of the defect and the proximity to free margins (helical rim) an ear star wedge flap was deemed most appropriate.  Using a sterile surgical marker, the appropriate flap was drawn incorporating the defect and placing the expected incisions between the helical rim and antihelix where possible.  The area thus outlined was incised through and through with a #15 scalpel blade.
Retention Suture Text: Retention sutures were placed to support the closure and prevent dehiscence.
Tumor Depth: Less than 6mm from granular layer and no invasion beyond the subcutaneous fat
Bilobed Flap Text: The defect edges were debeveled with a #15 scalpel blade.  Given the location of the defect and the proximity to free margins a bilobe flap was deemed most appropriate.  Using a sterile surgical marker, an appropriate bilobe flap drawn around the defect.    The area thus outlined was incised deep to adipose tissue with a #15 scalpel blade.  The skin margins were undermined to an appropriate distance in all directions utilizing iris scissors.
Consent 1/Introductory Paragraph: The rationale for Mohs was explained to the patient and consent was obtained. The risks, benefits and alternatives to therapy were discussed in detail. Specifically, the risks of infection, scarring, bleeding, prolonged wound healing, incomplete removal, allergy to anesthesia, nerve injury and recurrence were addressed. Prior to the procedure, the treatment site was clearly identified and confirmed by the patient. All components of Universal Protocol/PAUSE Rule completed.
Graft Donor Site Epidermal Sutures (Optional): 5-0 Surgipro
Repair Hemostasis (Optional): Pinpoint electrocautery
Consent (Nose)/Introductory Paragraph: The rationale for Mohs was explained to the patient and consent was obtained. The risks, benefits and alternatives to therapy were discussed in detail. Specifically, the risks of nasal deformity, changes in the flow of air through the nose, infection, scarring, bleeding, prolonged wound healing, incomplete removal, allergy to anesthesia, nerve injury and recurrence were addressed. Prior to the procedure, the treatment site was clearly identified and confirmed by the patient. All components of Universal Protocol/PAUSE Rule completed.
Cheek-To-Nose Interpolation Flap Text: A decision was made to reconstruct the defect utilizing an interpolation axial flap and a staged reconstruction.  A telfa template was made of the defect.  This telfa template was then used to outline the Cheek-To-Nose Interpolation flap.  The donor area for the pedicle flap was then injected with anesthesia.  The flap was excised through the skin and subcutaneous tissue down to the layer of the underlying musculature.  The interpolation flap was carefully excised within this deep plane to maintain its blood supply.  The edges of the donor site were undermined.   The donor site was closed in a primary fashion.  The pedicle was then rotated into position and sutured.  Once the tube was sutured into place, adequate blood supply was confirmed with blanching and refill.  The pedicle was then wrapped with xeroform gauze and dressed appropriately with a telfa and gauze bandage to ensure continued blood supply and protect the attached pedicle.
Stage 1: Number Of Blocks?: 1
Paramedian Forehead Flap Text: A decision was made to reconstruct the defect utilizing an interpolation axial flap and a staged reconstruction.  A telfa template was made of the defect.  This telfa template was then used to outline the paramedian forehead pedicle flap.  The donor area for the pedicle flap was then injected with anesthesia.  The flap was excised through the skin and subcutaneous tissue down to the layer of the underlying musculature.  The pedicle flap was carefully excised within this deep plane to maintain its blood supply.  The edges of the donor site were undermined.   The donor site was closed in a primary fashion.  The pedicle was then rotated into position and sutured.  Once the tube was sutured into place, adequate blood supply was confirmed with blanching and refill.  The pedicle was then wrapped with xeroform gauze and dressed appropriately with a telfa and gauze bandage to ensure continued blood supply and protect the attached pedicle.
Mustarde Flap Text: The defect edges were debeveled with a #15 scalpel blade.  Given the size, depth and location of the defect and the proximity to free margins a Mustarde flap was deemed most appropriate.  Using a sterile surgical marker, an appropriate flap was drawn incorporating the defect. The area thus outlined was incised with a #15 scalpel blade.  The skin margins were undermined to an appropriate distance in all directions utilizing iris scissors.
Modified Advancement Flap Text: The defect edges were debeveled with a #15 scalpel blade.  Given the location of the defect, shape of the defect and the proximity to free margins a modified advancement flap was deemed most appropriate.  Using a sterile surgical marker, an appropriate advancement flap was drawn incorporating the defect and placing the expected incisions within the relaxed skin tension lines where possible.    The area thus outlined was incised deep to adipose tissue with a #15 scalpel blade.  The skin margins were undermined to an appropriate distance in all directions utilizing iris scissors.
Mucosal Advancement Flap Text: Given the location of the defect, shape of the defect and the proximity to free margins a mucosal advancement flap was deemed most appropriate. Incisions were made with a 15 blade scalpel in the appropriate fashion along the cutaneous vermilion border and the mucosal lip. The remaining actinically damaged mucosal tissue was excised.  The mucosal advancement flap was then elevated to the gingival sulcus with care taken to preserve the neurovascular structures and advanced into the primary defect. Care was taken to ensure that precise realignment of the vermilion border was achieved.
Mauc Instructions: By selecting yes to the question below the MAUC number will be added into the note.  This will be calculated automatically based on the diagnosis chosen, the size entered, the body zone selected (H,M,L) and the specific indications you chose. You will also have the option to override the Mohs AUC if you disagree with the automatically calculated number and this option is found in the Case Summary tab.
Detail Level: Detailed
Area M Indication Text: Tumors in this location are included in Area M (cheek, forehead, scalp, neck, jawline and pretibial skin).  Mohs surgery is indicated for tumors in these anatomic locations.
Hemostasis: Electrocautery
Postop Diagnosis: same
Nasal Turnover Hinge Flap Text: The defect edges were debeveled with a #15 scalpel blade.  Given the size, depth, location of the defect and the defect being full thickness a nasal turnover hinge flap was deemed most appropriate.  Using a sterile surgical marker, an appropriate hinge flap was drawn incorporating the defect. The area thus outlined was incised with a #15 scalpel blade. The flap was designed to recreate the nasal mucosal lining and the alar rim. The skin margins were undermined to an appropriate distance in all directions utilizing iris scissors.
Cartilage Graft Text: The defect edges were debeveled with a #15 scalpel blade.  Given the location of the defect, shape of the defect, the fact the defect involved a full thickness cartilage defect a cartilage graft was deemed most appropriate.  An appropriate donor site was identified, cleansed, and anesthetized. The cartilage graft was then harvested and transferred to the recipient site, oriented appropriately and then sutured into place.  The secondary defect was then repaired using a primary closure.
Consent (Near Eyelid Margin)/Introductory Paragraph: The rationale for Mohs was explained to the patient and consent was obtained. The risks, benefits and alternatives to therapy were discussed in detail. Specifically, the risks of ectropion or eyelid deformity, infection, scarring, bleeding, prolonged wound healing, incomplete removal, allergy to anesthesia, nerve injury and recurrence were addressed. Prior to the procedure, the treatment site was clearly identified and confirmed by the patient. All components of Universal Protocol/PAUSE Rule completed.
Cheiloplasty (Less Than 50%) Text: A decision was made to reconstruct the defect with a  cheiloplasty.  The defect was undermined extensively.  Additional obicularis oris muscle was excised with a 15 blade scalpel.  The defect was converted into a full thickness wedge, of less than 50% of the vertical height of the lip, to facilite a better cosmetic result.  Small vessels were then tied off with 5-0 monocyrl. The obicularis oris, superficial fascia, adipose and dermis were then reapproximated.  After the deeper layers were approximated the epidermis was reapproximated with particular care given to realign the vermilion border.
Rectangular Flap Text: The defect edges were debeveled with a #15 scalpel blade. Given the location of the defect and the proximity to free margins a rectangular flap was deemed most appropriate. Using a sterile surgical marker, an appropriate rectangular flap was drawn incorporating the defect. The area thus outlined was incised deep to adipose tissue with a #15 scalpel blade. The skin margins were undermined to an appropriate distance in all directions utilizing iris scissors. Following this, the designed flap was carried over into the primary defect and sutured into place.
Alternatives Discussed Intro (Do Not Add Period): I discussed alternative treatments to Mohs surgery and specifically discussed the risks and benefits of
Eyelid Partial Thickness Repair - 97083: The eyelid defect was partial thickness which required a wedge repair of the eyelid. Special care was taken to ensure that the eyelid margin was realligned when placing sutures.
Graft Donor Site Bandage (Optional-Leave Blank If You Don't Want In Note): Aquaplast was fitted to the graft site and sewn into place. A pressure bandage were applied to the donor site and over the aquaplast bolster.
Suture Removal: 7 days
Location Indication Override (Is Already Calculated Based On Selected Body Location): Area M
Hemigard Intro: Due to skin fragility and wound tension, it was decided to use HEMIGARD adhesive retention suture devices to permit a linear closure. The skin was cleaned and dried for a 6cm distance away from the wound. Excessive hair, if present, was removed to allow for adhesion.
Vermilion Border Text: The closure involved the vermilion border.
Undermining Type: Entire Wound
Intermediate Repair And Flap Additional Text (Will Appearing After The Standard Complex Repair Text): The intermediate repair was not sufficient to completely close the primary defect. The remaining additional defect was repaired with the flap mentioned below.
Pinch Graft Text: The defect edges were debeveled with a #15 scalpel blade. Given the location of the defect, shape of the defect and the proximity to free margins a pinch graft was deemed most appropriate. Using a sterile surgical marker, the primary defect shape was transferred to the donor site. The area thus outlined was incised deep to adipose tissue with a #15 scalpel blade.  The harvested graft was then trimmed of adipose tissue until only dermis and epidermis was left. The skin margins of the secondary defect were undermined to an appropriate distance in all directions utilizing iris scissors.  The secondary defect was closed with interrupted buried subcutaneous sutures.  The skin edges were then re-apposed with running  sutures.  The skin graft was then placed in the primary defect and oriented appropriately.
Purse String (Intermediate) Text: Given the location of the defect and the characteristics of the surrounding skin a purse string intermediate closure was deemed most appropriate.  Undermining was performed circumfirentially around the surgical defect.  A purse string suture was then placed and tightened.
Full Thickness Lip Wedge Repair (Flap) Text: Given the location of the defect and the proximity to free margins a full thickness wedge repair was deemed most appropriate.  Using a sterile surgical marker, the appropriate repair was drawn incorporating the defect and placing the expected incisions perpendicular to the vermilion border.  The vermilion border was also meticulously outlined to ensure appropriate reapproximation during the repair.  The area thus outlined was incised through and through with a #15 scalpel blade.  The muscularis and dermis were reaproximated with deep sutures following hemostasis. Care was taken to realign the vermilion border before proceeding with the superficial closure.  Once the vermilion was realigned the superfical and mucosal closure was finished.
Island Pedicle Flap Text: The defect edges were debeveled with a #15 scalpel blade.  Given the location of the defect, shape of the defect and the proximity to free margins an island pedicle advancement flap was deemed most appropriate.  Using a sterile surgical marker, an appropriate advancement flap was drawn incorporating the defect, outlining the appropriate donor tissue and placing the expected incisions within the relaxed skin tension lines where possible.    The area thus outlined was incised deep to adipose tissue with a #15 scalpel blade.  The skin margins were undermined to an appropriate distance in all directions around the primary defect and laterally outward around the island pedicle utilizing iris scissors.  There was minimal undermining beneath the pedicle flap.
Double O-Z Flap Text: The defect edges were debeveled with a #15 scalpel blade.  Given the location of the defect, shape of the defect and the proximity to free margins a Double O-Z flap was deemed most appropriate.  Using a sterile surgical marker, an appropriate transposition flap was drawn incorporating the defect and placing the expected incisions within the relaxed skin tension lines where possible. The area thus outlined was incised deep to adipose tissue with a #15 scalpel blade.  The skin margins were undermined to an appropriate distance in all directions utilizing iris scissors.
Hatchet Flap Text: The defect edges were debeveled with a #15 scalpel blade.  Given the location of the defect, shape of the defect and the proximity to free margins a hatchet flap was deemed most appropriate.  Using a sterile surgical marker, an appropriate hatchet flap was drawn incorporating the defect and placing the expected incisions within the relaxed skin tension lines where possible.    The area thus outlined was incised deep to adipose tissue with a #15 scalpel blade.  The skin margins were undermined to an appropriate distance in all directions utilizing iris scissors.
Double O-Z Plasty Text: The defect edges were debeveled with a #15 scalpel blade.  Given the location of the defect, shape of the defect and the proximity to free margins a Double O-Z plasty (double transposition flap) was deemed most appropriate.  Using a sterile surgical marker, the appropriate transposition flaps were drawn incorporating the defect and placing the expected incisions within the relaxed skin tension lines where possible. The area thus outlined was incised deep to adipose tissue with a #15 scalpel blade.  The skin margins were undermined to an appropriate distance in all directions utilizing iris scissors.  Hemostasis was achieved with electrocautery.  The flaps were then transposed into place, one clockwise and the other counterclockwise, and anchored with interrupted buried subcutaneous sutures.
Brow Lift Text: A midfrontal incision was made medially to the defect to allow access to the tissues just superior to the left eyebrow. Following careful dissection inferiorly in a supraperiosteal plane to the level of the left eyebrow, several 3-0 monocryl sutures were used to resuspend the eyebrow orbicularis oculi muscular unit to the superior frontal bone periosteum. This resulted in an appropriate reapproximation of static eyebrow symmetry and correction of the left brow ptosis.
Tumor Debulked?: curette
Manual Repair Warning Statement: We plan on removing the manually selected variable below in favor of our much easier automatic structured text blocks found in the previous tab. We decided to do this to help make the flow better and give you the full power of structured data. Manual selection is never going to be ideal in our platform and I would encourage you to avoid using manual selection from this point on, especially since I will be sunsetting this feature. It is important that you do one of two things with the customized text below. First, you can save all of the text in a word file so you can have it for future reference. Second, transfer the text to the appropriate area in the Library tab. Lastly, if there is a flap or graft type which we do not have you need to let us know right away so I can add it in before the variable is hidden. No need to panic, we plan to give you roughly 6 months to make the change.
Same Histology In Subsequent Stages Text: The pattern and morphology of the tumor is as described in the first stage.
Inflammation Suggestive Of Cancer Camouflage Histology Text: There was a dense lymphocytic infiltrate which prevented adequate histologic evaluation of adjacent structures.
Debridement Text: The wound edges were debrided prior to proceeding with the closure to facilitate wound healing.
Non-Graft Cartilage Fenestration Text: The cartilage was fenestrated with a 2mm punch biopsy to help facilitate healing.
Melolabial Interpolation Flap Text: A decision was made to reconstruct the defect utilizing an interpolation axial flap and a staged reconstruction.  A telfa template was made of the defect.  This telfa template was then used to outline the melolabial interpolation flap.  The donor area for the pedicle flap was then injected with anesthesia.  The flap was excised through the skin and subcutaneous tissue down to the layer of the underlying musculature.  The pedicle flap was carefully excised within this deep plane to maintain its blood supply.  The edges of the donor site were undermined.   The donor site was closed in a primary fashion.  The pedicle was then rotated into position and sutured.  Once the tube was sutured into place, adequate blood supply was confirmed with blanching and refill.  The pedicle was then wrapped with xeroform gauze and dressed appropriately with a telfa and gauze bandage to ensure continued blood supply and protect the attached pedicle.
Mohs Rapid Report Verbiage: The area of clinically evident tumor was marked with skin marking ink and appropriately hatched.  The initial incision was made following the Mohs approach through the skin.  The specimen was taken to the lab, divided into the necessary number of pieces, chromacoded and processed according to the Mohs protocol.  This was repeated in successive stages until a tumor free defect was achieved.
Nasalis-Muscle-Based Myocutaneous Island Pedicle Flap Text: Using a #15 blade, an incision was made around the donor flap to the level of the nasalis muscle. Wide lateral undermining was then performed in both the subcutaneous plane above the nasalis muscle, and in a submuscular plane just above periosteum. This allowed the formation of a free nasalis muscle axial pedicle (based on the angular artery) which was still attached to the actual cutaneous flap, increasing its mobility and vascular viability. Hemostasis was obtained with pinpoint electrocoagulation. The flap was mobilized into position and the pivotal anchor points positioned and stabilized with buried interrupted sutures. Subcutaneous and dermal tissues were closed in a multilayered fashion with sutures. Tissue redundancies were excised, and the epidermal edges were apposed without significant tension and sutured with sutures.
Advancement-Rotation Flap Text: The defect edges were debeveled with a #15 scalpel blade.  Given the location of the defect, shape of the defect and the proximity to free margins an advancement-rotation flap was deemed most appropriate.  Using a sterile surgical marker, an appropriate flap was drawn incorporating the defect and placing the expected incisions within the relaxed skin tension lines where possible. The area thus outlined was incised deep to adipose tissue with a #15 scalpel blade.  The skin margins were undermined to an appropriate distance in all directions utilizing iris scissors.
Undermining Location (Optional): in the superficial subcutaneous fat
Double Island Pedicle Flap Text: The defect edges were debeveled with a #15 scalpel blade.  Given the location of the defect, shape of the defect and the proximity to free margins a double island pedicle advancement flap was deemed most appropriate.  Using a sterile surgical marker, an appropriate advancement flap was drawn incorporating the defect, outlining the appropriate donor tissue and placing the expected incisions within the relaxed skin tension lines where possible.    The area thus outlined was incised deep to adipose tissue with a #15 scalpel blade.  The skin margins were undermined to an appropriate distance in all directions around the primary defect and laterally outward around the island pedicle utilizing iris scissors.  There was minimal undermining beneath the pedicle flap.
Orbicularis Oris Muscle Flap Text: The defect edges were debeveled with a #15 scalpel blade.  Given that the defect affected the competency of the oral sphincter an orbicularis oris muscle flap was deemed most appropriate to restore this competency and normal muscle function.  Using a sterile surgical marker, an appropriate flap was drawn incorporating the defect. The area thus outlined was incised with a #15 scalpel blade.
Depth Of Tumor Invasion (For Histology): dermis
Z Plasty Text: The lesion was extirpated to the level of the fat with a #15 scalpel blade.  Given the location of the defect, shape of the defect and the proximity to free margins a Z-plasty was deemed most appropriate for repair.  Using a sterile surgical marker, the appropriate transposition arms of the Z-plasty were drawn incorporating the defect and placing the expected incisions within the relaxed skin tension lines where possible.    The area thus outlined was incised deep to adipose tissue with a #15 scalpel blade.  The skin margins were undermined to an appropriate distance in all directions utilizing iris scissors.  The opposing transposition arms were then transposed into place in opposite direction and anchored with interrupted buried subcutaneous sutures.
Epidermal Autograft Text: The defect edges were debeveled with a #15 scalpel blade.  Given the location of the defect, shape of the defect and the proximity to free margins an epidermal autograft was deemed most appropriate.  Using a sterile surgical marker, the primary defect shape was transferred to the donor site. The epidermal graft was then harvested.  The skin graft was then placed in the primary defect and oriented appropriately.
Bilateral Rotation Flap Text: The defect edges were debeveled with a #15 scalpel blade. Given the location of the defect, shape of the defect and the proximity to free margins a bilateral rotation flap was deemed most appropriate. Using a sterile surgical marker, an appropriate rotation flap was drawn incorporating the defect and placing the expected incisions within the relaxed skin tension lines where possible. The area thus outlined was incised deep to adipose tissue with a #15 scalpel blade. The skin margins were undermined to an appropriate distance in all directions utilizing iris scissors. Following this, the designed flap was carried over into the primary defect and sutured into place.

## 2025-01-20 ENCOUNTER — APPOINTMENT (OUTPATIENT)
Dept: URBAN - METROPOLITAN AREA CLINIC 36 | Facility: CLINIC | Age: 75
Setting detail: DERMATOLOGY
End: 2025-01-20

## 2025-01-20 DIAGNOSIS — Z48.817 ENCOUNTER FOR SURGICAL AFTERCARE FOLLOWING SURGERY ON THE SKIN AND SUBCUTANEOUS TISSUE: ICD-10-CM

## 2025-01-20 DIAGNOSIS — Z41.9 ENCOUNTER FOR PROCEDURE FOR PURPOSES OTHER THAN REMEDYING HEALTH STATE, UNSPECIFIED: ICD-10-CM

## 2025-01-20 DIAGNOSIS — Z48.02 ENCOUNTER FOR REMOVAL OF SUTURES: ICD-10-CM

## 2025-01-20 PROCEDURE — ? POST-OP WOUND CHECK

## 2025-01-20 PROCEDURE — ? SUTURE REMOVAL (GLOBAL PERIOD)

## 2025-01-20 PROCEDURE — ? CO2RE

## 2025-01-20 ASSESSMENT — LOCATION DETAILED DESCRIPTION DERM
LOCATION DETAILED: RIGHT INFERIOR MEDIAL MALAR CHEEK
LOCATION DETAILED: RIGHT CENTRAL MALAR CHEEK

## 2025-01-20 ASSESSMENT — LOCATION ZONE DERM: LOCATION ZONE: FACE

## 2025-01-20 ASSESSMENT — LOCATION SIMPLE DESCRIPTION DERM: LOCATION SIMPLE: RIGHT CHEEK

## 2025-01-20 NOTE — PROCEDURE: POST-OP WOUND CHECK
Detail Level: Detailed
Add 94859 Cpt? (Important Note: In 2017 The Use Of 65394 Is Being Tracked By Cms To Determine Future Global Period Reimbursement For Global Periods): no

## 2025-01-20 NOTE — PROCEDURE: CO2RE
Detail Level: Simple
Energy (Mj): 80
Add Another Laser Setting?: no
Eye Protection Text: A corneal shield was inserted after appropriate application of topical anesthesia.
Consent: Written consent obtained, risks reviewed including but not limited to crusting, scabbing, blistering, scarring, darker or lighter pigmentary change, incomplete improvement of dyschromia, wrinkles, prolonged erythema and facial swelling, infection and bleeding.
Treatment Number: 1
Anesthesia Type: 1% lidocaine with epinephrine
Price (Use Numbers Only, No Special Characters Or $): 0
Laser Mode: Light
Other Location: left distal pretibial region
Post-Care Instructions: I reviewed with the patient in detail post-care instructions. Patient should avoid sun until area fully healed. Pt should apply vaseline to treated areas, and remove crusts gently with water-vinegar soaks.
Other Location: right cheek
Laser Mode: Deep
Energy (Mj): 70
External Cooling Fan Speed: 5

## 2025-01-20 NOTE — PROCEDURE: SUTURE REMOVAL (GLOBAL PERIOD)
Detail Level: Detailed
Add 04485 Cpt? (Important Note: In 2017 The Use Of 10096 Is Being Tracked By Cms To Determine Future Global Period Reimbursement For Global Periods): no

## 2025-02-12 ENCOUNTER — APPOINTMENT (OUTPATIENT)
Dept: URBAN - METROPOLITAN AREA CLINIC 35 | Facility: CLINIC | Age: 75
Setting detail: DERMATOLOGY
End: 2025-02-12

## 2025-02-12 DIAGNOSIS — L81.4 OTHER MELANIN HYPERPIGMENTATION: ICD-10-CM

## 2025-02-12 DIAGNOSIS — Z71.89 OTHER SPECIFIED COUNSELING: ICD-10-CM

## 2025-02-12 DIAGNOSIS — L20.89 OTHER ATOPIC DERMATITIS: ICD-10-CM

## 2025-02-12 DIAGNOSIS — D22 MELANOCYTIC NEVI: ICD-10-CM

## 2025-02-12 DIAGNOSIS — L82.1 OTHER SEBORRHEIC KERATOSIS: ICD-10-CM

## 2025-02-12 DIAGNOSIS — Z85.828 PERSONAL HISTORY OF OTHER MALIGNANT NEOPLASM OF SKIN: ICD-10-CM

## 2025-02-12 PROBLEM — D22.5 MELANOCYTIC NEVI OF TRUNK: Status: ACTIVE | Noted: 2025-02-12

## 2025-02-12 PROCEDURE — 99213 OFFICE O/P EST LOW 20 MIN: CPT

## 2025-02-12 PROCEDURE — ? TREATMENT REGIMEN

## 2025-02-12 PROCEDURE — ? COUNSELING

## 2025-02-12 ASSESSMENT — LOCATION SIMPLE DESCRIPTION DERM
LOCATION SIMPLE: RIGHT SHOULDER
LOCATION SIMPLE: RIGHT UPPER BACK
LOCATION SIMPLE: RIGHT CHEEK
LOCATION SIMPLE: LEFT UPPER BACK
LOCATION SIMPLE: ABDOMEN
LOCATION SIMPLE: RIGHT ZYGOMA
LOCATION SIMPLE: RIGHT FOOT

## 2025-02-12 ASSESSMENT — LOCATION DETAILED DESCRIPTION DERM
LOCATION DETAILED: RIGHT INFERIOR UPPER BACK
LOCATION DETAILED: RIGHT MEDIAL ZYGOMA
LOCATION DETAILED: LEFT SUPERIOR UPPER BACK
LOCATION DETAILED: EPIGASTRIC SKIN
LOCATION DETAILED: RIGHT POSTERIOR SHOULDER
LOCATION DETAILED: RIGHT CENTRAL MALAR CHEEK
LOCATION DETAILED: RIGHT INFERIOR MEDIAL MALAR CHEEK
LOCATION DETAILED: RIGHT DORSAL FOOT

## 2025-02-12 ASSESSMENT — LOCATION ZONE DERM
LOCATION ZONE: FEET
LOCATION ZONE: FACE
LOCATION ZONE: TRUNK
LOCATION ZONE: ARM

## 2025-02-18 ENCOUNTER — APPOINTMENT (OUTPATIENT)
Dept: URBAN - METROPOLITAN AREA CLINIC 36 | Facility: CLINIC | Age: 75
Setting detail: DERMATOLOGY
End: 2025-02-18

## 2025-02-18 DIAGNOSIS — Z48.817 ENCOUNTER FOR SURGICAL AFTERCARE FOLLOWING SURGERY ON THE SKIN AND SUBCUTANEOUS TISSUE: ICD-10-CM

## 2025-02-18 DIAGNOSIS — Z41.9 ENCOUNTER FOR PROCEDURE FOR PURPOSES OTHER THAN REMEDYING HEALTH STATE, UNSPECIFIED: ICD-10-CM

## 2025-02-18 PROCEDURE — ? POST-OP WOUND CHECK

## 2025-02-18 PROCEDURE — ? CO2RE

## 2025-02-18 ASSESSMENT — LOCATION DETAILED DESCRIPTION DERM
LOCATION DETAILED: RIGHT CENTRAL MALAR CHEEK
LOCATION DETAILED: RIGHT INFERIOR MEDIAL MALAR CHEEK

## 2025-02-18 ASSESSMENT — LOCATION ZONE DERM: LOCATION ZONE: FACE

## 2025-02-18 ASSESSMENT — LOCATION SIMPLE DESCRIPTION DERM: LOCATION SIMPLE: RIGHT CHEEK

## 2025-02-18 NOTE — PROCEDURE: CO2RE
Eye Protection Text: A corneal shield was inserted after appropriate application of topical anesthesia.
Laser Mode: Light
Add Another Laser Setting?: no
Detail Level: Simple
Energy (Mj): 80
Treatment Number: 1
Price (Use Numbers Only, No Special Characters Or $): 0
Other Location: left distal pretibial region
Other Location: right cheek 
Energy (Mj): 70
Treatment Number: 2
External Cooling Fan Speed: 5
Laser Mode: Deep
Consent: Written consent obtained, risks reviewed including but not limited to crusting, scabbing, blistering, scarring, darker or lighter pigmentary change, incomplete improvement of dyschromia, wrinkles, prolonged erythema and facial swelling, infection and bleeding.
Post-Care Instructions: I reviewed with the patient in detail post-care instructions. Patient should avoid sun until area fully healed. Pt should apply vaseline to treated areas, and remove crusts gently with water-vinegar soaks.
Anesthesia Type: 1% lidocaine with epinephrine

## 2025-02-18 NOTE — PROCEDURE: POST-OP WOUND CHECK
Detail Level: Detailed
Add 82151 Cpt? (Important Note: In 2017 The Use Of 78895 Is Being Tracked By Cms To Determine Future Global Period Reimbursement For Global Periods): no

## 2025-04-15 ENCOUNTER — APPOINTMENT (OUTPATIENT)
Dept: URBAN - METROPOLITAN AREA CLINIC 36 | Facility: CLINIC | Age: 75
Setting detail: DERMATOLOGY
End: 2025-04-15

## 2025-04-15 DIAGNOSIS — Z48.817 ENCOUNTER FOR SURGICAL AFTERCARE FOLLOWING SURGERY ON THE SKIN AND SUBCUTANEOUS TISSUE: ICD-10-CM

## 2025-04-15 DIAGNOSIS — Z41.9 ENCOUNTER FOR PROCEDURE FOR PURPOSES OTHER THAN REMEDYING HEALTH STATE, UNSPECIFIED: ICD-10-CM

## 2025-04-15 PROCEDURE — ? POST-OP WOUND CHECK

## 2025-04-15 PROCEDURE — ? CO2RE

## 2025-04-15 ASSESSMENT — LOCATION ZONE DERM: LOCATION ZONE: FACE

## 2025-04-15 ASSESSMENT — LOCATION SIMPLE DESCRIPTION DERM: LOCATION SIMPLE: RIGHT CHEEK

## 2025-04-15 ASSESSMENT — LOCATION DETAILED DESCRIPTION DERM
LOCATION DETAILED: RIGHT CENTRAL MALAR CHEEK
LOCATION DETAILED: RIGHT INFERIOR MEDIAL MALAR CHEEK

## 2025-04-15 NOTE — PROCEDURE: POST-OP WOUND CHECK
Detail Level: Detailed
Add 19327 Cpt? (Important Note: In 2017 The Use Of 96502 Is Being Tracked By Cms To Determine Future Global Period Reimbursement For Global Periods): no

## 2025-04-15 NOTE — PROCEDURE: CO2RE
Eye Protection Text: A corneal shield was inserted after appropriate application of topical anesthesia.
Laser Mode: Light
Add Another Laser Setting?: no
Detail Level: Simple
Energy (Mj): 70
Energy (Mj): 80
Treatment Number: 1
Price (Use Numbers Only, No Special Characters Or $): 0
Other Location: left distal pretibial region
Other Location: right cheek 
Treatment Number: 3
External Cooling Fan Speed: 5
Laser Mode: Deep
Consent: Written consent obtained, risks reviewed including but not limited to crusting, scabbing, blistering, scarring, darker or lighter pigmentary change, incomplete improvement of dyschromia, wrinkles, prolonged erythema and facial swelling, infection and bleeding.
Post-Care Instructions: I reviewed with the patient in detail post-care instructions. Patient should avoid sun until area fully healed. Pt should apply vaseline to treated areas, and remove crusts gently with water-vinegar soaks.
Anesthesia Type: 1% lidocaine with epinephrine

## 2025-06-10 ENCOUNTER — APPOINTMENT (OUTPATIENT)
Dept: URBAN - METROPOLITAN AREA CLINIC 36 | Facility: CLINIC | Age: 75
Setting detail: DERMATOLOGY
End: 2025-06-10

## 2025-06-10 DIAGNOSIS — Z48.817 ENCOUNTER FOR SURGICAL AFTERCARE FOLLOWING SURGERY ON THE SKIN AND SUBCUTANEOUS TISSUE: ICD-10-CM

## 2025-06-10 DIAGNOSIS — Z41.9 ENCOUNTER FOR PROCEDURE FOR PURPOSES OTHER THAN REMEDYING HEALTH STATE, UNSPECIFIED: ICD-10-CM

## 2025-06-10 PROCEDURE — ? POST-OP WOUND CHECK

## 2025-06-10 PROCEDURE — ? FRAXEL

## 2025-06-10 ASSESSMENT — LOCATION DETAILED DESCRIPTION DERM
LOCATION DETAILED: RIGHT INFERIOR MEDIAL MALAR CHEEK
LOCATION DETAILED: RIGHT CENTRAL MALAR CHEEK

## 2025-06-10 ASSESSMENT — LOCATION SIMPLE DESCRIPTION DERM: LOCATION SIMPLE: RIGHT CHEEK

## 2025-06-10 ASSESSMENT — LOCATION ZONE DERM: LOCATION ZONE: FACE

## 2025-06-10 NOTE — PROCEDURE: POST-OP WOUND CHECK
Detail Level: Detailed
Add 86647 Cpt? (Important Note: In 2017 The Use Of 62816 Is Being Tracked By Cms To Determine Future Global Period Reimbursement For Global Periods): no

## 2025-06-10 NOTE — PROCEDURE: FRAXEL
Treatment Number: 1
Large Plastic Eye Shield Text: The ocular mucosa was anesthetized with tetracaine. Once adequate anesthesia was optained, large plastic eye shields were inserted and remained in place until the procedure was completed.
Location: Use Location Override
Price (Use Numbers Only, No Special Characters Or $): 0
External Cooling Fan Speed: 5
Medium Plastic Eye Shield Text: The ocular mucosa was anesthetized with tetracaine. Once adequate anesthesia was optained, medium plastic eye shields were inserted and remained in place until the procedure was completed.
Wavelength: 1550nm
Was An Eye Shield Used?: No
Energy(Mj/Cm2): 70
Location Override: right cheek 
Large Metal Eye Shield Text: The ocular mucosa was anesthetized with tetracaine. Once adequate anesthesia was optained, large metal eye shields were inserted and remained in place until the procedure was completed.
Number Of Passes: 4
Consent: Written consent obtained, risks reviewed including but not limited to pain and incomplete improvement.
Medium Metal Eye Shield Text: The ocular mucosa was anesthetized with tetracaine. Once adequate anesthesia was optained, medium metal eye shields were inserted and remained in place until the procedure was completed.
Post-Care Instructions: I reviewed with the patient in detail post-care instructions. Patient should avoid sun until area fully healed.
Small Plastic Eye Shield Text: The ocular mucosa was anesthetized with tetracaine. Once adequate anesthesia was optained, small plastic eye shields were inserted and remained in place until the procedure was completed.
Small Metal Eye Shield Text: The ocular mucosa was anesthetized with tetracaine. Once adequate anesthesia was optained, small metal eye shields were inserted and remained in place until the procedure was completed.
Indication: surgical scars
Detail Level: Zone

## 2025-08-06 ENCOUNTER — APPOINTMENT (OUTPATIENT)
Dept: URBAN - METROPOLITAN AREA CLINIC 36 | Facility: CLINIC | Age: 75
Setting detail: DERMATOLOGY
End: 2025-08-06

## 2025-08-06 DIAGNOSIS — Z41.9 ENCOUNTER FOR PROCEDURE FOR PURPOSES OTHER THAN REMEDYING HEALTH STATE, UNSPECIFIED: ICD-10-CM

## 2025-08-06 DIAGNOSIS — Z48.817 ENCOUNTER FOR SURGICAL AFTERCARE FOLLOWING SURGERY ON THE SKIN AND SUBCUTANEOUS TISSUE: ICD-10-CM

## 2025-08-06 PROCEDURE — ? LASER RESURFACING

## 2025-08-06 PROCEDURE — ? POST-OP WOUND CHECK

## 2025-08-06 ASSESSMENT — LOCATION DETAILED DESCRIPTION DERM
LOCATION DETAILED: RIGHT INFERIOR MEDIAL MALAR CHEEK
LOCATION DETAILED: RIGHT CENTRAL MALAR CHEEK
LOCATION DETAILED: RIGHT INFERIOR MEDIAL MALAR CHEEK

## 2025-08-06 ASSESSMENT — LOCATION ZONE DERM
LOCATION ZONE: FACE
LOCATION ZONE: FACE

## 2025-08-06 ASSESSMENT — LOCATION SIMPLE DESCRIPTION DERM
LOCATION SIMPLE: RIGHT CHEEK
LOCATION SIMPLE: RIGHT CHEEK

## 2025-08-13 ENCOUNTER — APPOINTMENT (OUTPATIENT)
Dept: URBAN - METROPOLITAN AREA CLINIC 35 | Facility: CLINIC | Age: 75
Setting detail: DERMATOLOGY
End: 2025-08-13

## 2025-08-13 DIAGNOSIS — Z85.828 PERSONAL HISTORY OF OTHER MALIGNANT NEOPLASM OF SKIN: ICD-10-CM

## 2025-08-13 DIAGNOSIS — L82.1 OTHER SEBORRHEIC KERATOSIS: ICD-10-CM

## 2025-08-13 DIAGNOSIS — Z71.89 OTHER SPECIFIED COUNSELING: ICD-10-CM

## 2025-08-13 DIAGNOSIS — D22 MELANOCYTIC NEVI: ICD-10-CM

## 2025-08-13 DIAGNOSIS — L81.4 OTHER MELANIN HYPERPIGMENTATION: ICD-10-CM

## 2025-08-13 PROBLEM — D22.5 MELANOCYTIC NEVI OF TRUNK: Status: ACTIVE | Noted: 2025-08-13

## 2025-08-13 PROCEDURE — ? COUNSELING

## 2025-08-13 ASSESSMENT — LOCATION SIMPLE DESCRIPTION DERM
LOCATION SIMPLE: RIGHT UPPER BACK
LOCATION SIMPLE: RIGHT CHEEK
LOCATION SIMPLE: LEFT UPPER BACK
LOCATION SIMPLE: RIGHT SHOULDER
LOCATION SIMPLE: ABDOMEN
LOCATION SIMPLE: RIGHT ZYGOMA

## 2025-08-13 ASSESSMENT — LOCATION DETAILED DESCRIPTION DERM
LOCATION DETAILED: LEFT SUPERIOR UPPER BACK
LOCATION DETAILED: RIGHT INFERIOR UPPER BACK
LOCATION DETAILED: RIGHT MEDIAL ZYGOMA
LOCATION DETAILED: RIGHT POSTERIOR SHOULDER
LOCATION DETAILED: RIGHT CENTRAL MALAR CHEEK
LOCATION DETAILED: RIGHT INFERIOR MEDIAL MALAR CHEEK
LOCATION DETAILED: EPIGASTRIC SKIN

## 2025-08-13 ASSESSMENT — LOCATION ZONE DERM
LOCATION ZONE: TRUNK
LOCATION ZONE: ARM
LOCATION ZONE: FACE

## (undated) DEVICE — TUBING CLEARLINK DUO-VENT - C-FLO (48EA/CA)

## (undated) DEVICE — CANNULA W/ SUPPLY TUBING O2 - (50/CA)

## (undated) DEVICE — SET EXTENSION WITH 2 PORTS (48EA/CA) ***PART #2C8610 IS A SUBSTITUTE*****

## (undated) DEVICE — MASK WITH FACE SHIELD (25/BX 4BX/CA)

## (undated) DEVICE — NEPTUNE 4 PORT MANIFOLD - (20/PK)

## (undated) DEVICE — GLOVE, LITE (PAIR)

## (undated) DEVICE — ELECTRODE 850 FOAM ADHESIVE - HYDROGEL RADIOTRNSPRNT (50/PK)

## (undated) DEVICE — BASIN EMESIS DISP. - (250/CA)

## (undated) DEVICE — BITE BLOCK ADULT 60FR (100EA/CA)

## (undated) DEVICE — GOWN SURGEONS LARGE - (32/CA)

## (undated) DEVICE — SPONGE GAUZE NON-STERILE 4X4 - (2000/CA 10PK/CA)

## (undated) DEVICE — CATHETER IV SAFETY 22 GA X 1 (50EA/BX)

## (undated) DEVICE — SENSOR SPO2 ADULT LNCS ADTX (20/BX) ORDER ITEM #19593

## (undated) DEVICE — SYRINGE DISP. 50CC LS - (40/BX)

## (undated) DEVICE — TUBE CONNECTING SUCTION - CLEAR PLASTIC STERILE 72 IN (50EA/CA)

## (undated) DEVICE — LACTATED RINGERS INJ 1000 ML - (14EA/CA 60CA/PF)

## (undated) DEVICE — FORCEP RADIAL JAW 4 STANDARD CAPACITY W/NEEDLE 240CM (40EA/BX)

## (undated) DEVICE — BLOCK BITE ENDOSCOPIC 2809 - (100/BX) INTERMEDIATE

## (undated) DEVICE — SITE INJ 7/8IN IV M LL ADPR - (100/CA) THIS IS A CUSTOM ITEM AND HAS A 30 DAY LEAD TIME

## (undated) DEVICE — TUBE SUCTION YANKAUER  1/4 X 6FT (20EA/CA)"

## (undated) DEVICE — KIT  I.V. START (100EA/CA)